# Patient Record
Sex: FEMALE | Race: WHITE | NOT HISPANIC OR LATINO | Employment: PART TIME | URBAN - METROPOLITAN AREA
[De-identification: names, ages, dates, MRNs, and addresses within clinical notes are randomized per-mention and may not be internally consistent; named-entity substitution may affect disease eponyms.]

---
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
## 2022-01-07 ENCOUNTER — OFFICE VISIT (OUTPATIENT)
Dept: URGENT CARE | Facility: CLINIC | Age: 22
End: 2022-01-07
Payer: COMMERCIAL

## 2022-01-07 VITALS
OXYGEN SATURATION: 98 % | BODY MASS INDEX: 25.46 KG/M2 | HEIGHT: 68 IN | RESPIRATION RATE: 18 BRPM | WEIGHT: 168 LBS | TEMPERATURE: 97.7 F | HEART RATE: 61 BPM

## 2022-01-07 DIAGNOSIS — J06.9 ACUTE URI: Primary | ICD-10-CM

## 2022-01-07 LAB — S PYO AG THROAT QL: NEGATIVE

## 2022-01-07 PROCEDURE — U0005 INFEC AGEN DETEC AMPLI PROBE: HCPCS | Performed by: PHYSICIAN ASSISTANT

## 2022-01-07 PROCEDURE — 99213 OFFICE O/P EST LOW 20 MIN: CPT | Performed by: PHYSICIAN ASSISTANT

## 2022-01-07 PROCEDURE — 87070 CULTURE OTHR SPECIMN AEROBIC: CPT | Performed by: PHYSICIAN ASSISTANT

## 2022-01-07 PROCEDURE — U0003 INFECTIOUS AGENT DETECTION BY NUCLEIC ACID (DNA OR RNA); SEVERE ACUTE RESPIRATORY SYNDROME CORONAVIRUS 2 (SARS-COV-2) (CORONAVIRUS DISEASE [COVID-19]), AMPLIFIED PROBE TECHNIQUE, MAKING USE OF HIGH THROUGHPUT TECHNOLOGIES AS DESCRIBED BY CMS-2020-01-R: HCPCS | Performed by: PHYSICIAN ASSISTANT

## 2022-01-07 PROCEDURE — 87880 STREP A ASSAY W/OPTIC: CPT | Performed by: PHYSICIAN ASSISTANT

## 2022-01-07 NOTE — LETTER
[place] [address]  Dept: [phone]    January 7, 2022    Patient: Valerie Quesada  YOB: 2000    Valerie Quesada was seen and evaluated at our [facility]  She may not return to work until test results are back  If patient is positive, may return to work on 1/10/22  If negative, will need to quarantine longer due to exposure        Sincerely,    Celina Lawson PA-C

## 2022-01-07 NOTE — PROGRESS NOTES
[facility]    NAME: Floyd Martin is a 25 y o  female  : 2000    MRN: [id number]  DATE: 2022  TIME: 4:25 PM    Assessment and Plan   Acute URI [J06 9]  1  Acute URI  POCT rapid strepA    COVID Only -Office Collect    Throat culture       Patient Instructions     Patient Instructions   Quarantine until test results are back   Vitamin D3 2000 IU daily  Vitamin C 1g every 12 hours  Multivitamin daily  Fluids and rest  Over the counter cold medication as needed  Follow up with PCP in 3-5 days  Proceed to  ER if symptoms worsen  [address]    Your healthcare provider and/or public health staff have evaluated you and have determined that you do not need to remain in the hospital at this time  At this time you can be isolated at home where you will be monitored by staff from your local or state health department  You should carefully follow the prevention and isolation steps below until a healthcare provider or local or state health department says that you can return to your normal activities  Stay home except to get medical care    People who are mildly ill with COVID-19 are able to isolate at home during their illness  You should restrict activities outside your home, except for getting medical care  Do not go to work, school, or public areas  Avoid using public transportation, ride-sharing, or taxis  Separate yourself from other people and animals in your home    People: As much as possible, you should stay in a specific room and away from other people in your home  Also, you should use a separate bathroom, if available  Animals: You should restrict contact with pets and other animals while you are sick with COVID-19, just like you would around other people   Although there have not been reports of pets or other animals becoming sick with COVID-19, it is still recommended that people sick with COVID-19 limit contact with animals until more information is known about the virus  When possible, have another member of your household care for your animals while you are sick  If you are sick with COVID-19, avoid contact with your pet, including petting, snuggling, being kissed or licked, and sharing food  If you must care for your pet or be around animals while you are sick, wash your hands before and after you interact with pets and wear a facemask  See COVID-19 and Animals for more information  Call ahead before visiting your doctor    If you have a medical appointment, call the healthcare provider and tell them that you have or may have COVID-19  This will help the healthcare providers office take steps to keep other people from getting infected or exposed  Wear a facemask    You should wear a facemask when you are around other people (e g , sharing a room or vehicle) or pets and before you enter a healthcare providers office  If you are not able to wear a facemask (for example, because it causes trouble breathing), then people who live with you should not stay in the same room with you, or they should wear a facemask if they enter your room  Cover your coughs and sneezes    Cover your mouth and nose with a tissue when you cough or sneeze  Throw used tissues in a lined trash can  Immediately wash your hands with soap and water for at least 20 seconds or, if soap and water are not available, clean your hands with an alcohol-based hand  that contains at least 60% alcohol  Clean your hands often    Wash your hands often with soap and water for at least 20 seconds, especially after blowing your nose, coughing, or sneezing; going to the bathroom; and before eating or preparing food  If soap and water are not readily available, use an alcohol-based hand  with at least 60% alcohol, covering all surfaces of your hands and rubbing them together until they feel dry  Soap and water are the best option if hands are visibly dirty   Avoid touching your eyes, nose, and mouth with unwashed hands  Avoid sharing personal household items    You should not share dishes, drinking glasses, cups, eating utensils, towels, or bedding with other people or pets in your home  After using these items, they should be washed thoroughly with soap and water  Clean all high-touch surfaces everyday    High touch surfaces include counters, tabletops, doorknobs, bathroom fixtures, toilets, phones, keyboards, tablets, and bedside tables  Also, clean any surfaces that may have blood, stool, or body fluids on them  Use a household cleaning spray or wipe, according to the label instructions  Labels contain instructions for safe and effective use of the cleaning product including precautions you should take when applying the product, such as wearing gloves and making sure you have good ventilation during use of the product  Monitor your symptoms    Seek prompt medical attention if your illness is worsening (e g , difficulty breathing)  Before seeking care, call your healthcare provider and tell them that you have, or are being evaluated for, COVID-19  Put on a facemask before you enter the facility  These steps will help the healthcare providers office to keep other people in the office or waiting room from getting infected or exposed  Ask your healthcare provider to call the local or [facility] health department  Persons who are placed under active monitoring or facilitated self-monitoring should follow instructions provided by their local health department or occupational health professionals, as appropriate  If you have a medical emergency and need to call 911, notify the dispatch personnel that you have, or are being evaluated for COVID-19  If possible, put on a facemask before emergency medical services arrive      Discontinuing home isolation    Patients with confirmed COVID-19 should remain under home isolation precautions until the following conditions are met:   - They have had no fever for at least 24 hours (that is one full day of no fever without the use medicine that reduces fevers)  AND  - other symptoms have improved (for example, when their cough or shortness of breath have improved)  AND  - If had mild or moderate illness, at least 10 days have passed since their symptoms first appeared or if severe illness (needed oxygen) or immunosuppressed, at least 20 days have passed since symptoms first appeared  Patients with confirmed COVID-19 should also notify close contacts (including their workplace) and ask that they self-quarantine  Currently, close contact is defined as being within 6 feet for 15 minutes or more from the period 24 hours starting 48 hours before symptom onset to the time at which the patient went into isolation  Close contacts of patients diagnosed with COVID-19 should be instructed by the patient to self-quarantine for 14 days from the last time of their last contact with the patient  Source: [employer]         Chief Complaint     Chief Complaint   Patient presents with    Cough     nausea started 30 th of dec     Sore Throat    Headache     stuffy nose        History of Present Illness   25year old  Female here with sore throat, headache, cough, nausea for the past week  Nasal congestion  Thinks that she has covid  Has had exposure  Symptoms started 12/30  Cough  Associated symptoms include headaches, postnasal drip and a sore throat  Pertinent negatives include no chills, ear pain, fever, shortness of breath or wheezing  Sore Throat   Associated symptoms include coughing and headaches  Pertinent negatives include no congestion, ear discharge, ear pain or shortness of breath  Headache   Associated symptoms include coughing, nausea, sinus pressure and a sore throat  Pertinent negatives include no ear pain or fever         Review of Systems   Review of Systems   Constitutional: Negative for appetite change, chills and fever  HENT: Positive for postnasal drip, sinus pressure and sore throat  Negative for congestion, ear discharge, ear pain, facial swelling and sneezing  Respiratory: Positive for cough  Negative for shortness of breath and wheezing  Gastrointestinal: Positive for nausea  Neurological: Positive for headaches  Current Medications     Current Outpatient Medications:     albuterol (ProAir HFA) 90 mcg/act inhaler, Inhale 2 puffs every 6 (six) hours as needed (exercise induced asthma), Disp: 8 5 g, Rfl: 0    albuterol (PROVENTIL HFA,VENTOLIN HFA) 90 mcg/act inhaler, Inhale 2 puffs every 6 (six) hours as needed for wheezing, Disp: , Rfl:     amitriptyline (ELAVIL) 50 mg tablet, Take 1 tablet (50 mg total) by mouth daily at bedtime, Disp: 90 tablet, Rfl: 1    medroxyPROGESTERone acetate (DEPO-PROVERA SYRINGE) 150 mg/mL injection, inject intramuscularly EVERY THREE MONTHS, Disp: , Rfl: 0    RA VITAMIN D-3 50 MCG (2000 UT) CAPS, Take 1 capsule by mouth daily, Disp: , Rfl: 0    topiramate (TOPAMAX) 100 mg tablet, Take 100 mg by mouth 2 (two) times a day, Disp: , Rfl:     Current Allergies     Allergies as of 01/07/2022    (No Known Allergies)          The following portions of the patient's history were reviewed and updated as appropriate: allergies, current medications, past family history, past medical history, past social history, past surgical history and problem list    Past Medical History:   Diagnosis Date    Allergies     Asthma     Migraine      History reviewed  No pertinent surgical history  History reviewed  No pertinent family history    Social History     Socioeconomic History    Marital status: Single     Spouse name: Not on file    Number of children: Not on file    Years of education: Not on file    Highest education level: Not on file   Occupational History    Not on file   Tobacco Use    Smoking status: Never Smoker    Smokeless tobacco: Never Used   Vaping Use    Vaping Use: Never used   Substance and Sexual Activity    Alcohol use: Yes    Drug use: No    Sexual activity: Yes   Other Topics Concern    Not on file   Social History Narrative    Not on file     Social Determinants of Health     Financial Resource Strain: Not on file   Food Insecurity: Not on file   Transportation Needs: Not on file   Physical Activity: Not on file   Stress: Not on file   Social Connections: Not on file   Intimate Partner Violence: Not on file   Housing Stability: Not on file     Medications have been verified  Objective   Pulse 61   Temp 97 7 °F (36 5 °C)   Resp 18   Ht 5' 8" (1 727 m)   Wt 76 2 kg (168 lb)   SpO2 98%   BMI 25 54 kg/m²      Physical Exam   Physical Exam  Vitals and nursing note reviewed  Constitutional:       General: She is not in acute distress  Appearance: She is well-developed  HENT:      Head: Normocephalic and atraumatic  Right Ear: Tympanic membrane normal       Left Ear: Tympanic membrane normal       Nose: Nose normal  No mucosal edema or rhinorrhea  Right Sinus: No maxillary sinus tenderness or frontal sinus tenderness  Left Sinus: No maxillary sinus tenderness or frontal sinus tenderness  Mouth/Throat:      Mouth: Mucous membranes are moist       Pharynx: Posterior oropharyngeal erythema present  No oropharyngeal exudate  Eyes:      Conjunctiva/sclera: Conjunctivae normal    Cardiovascular:      Rate and Rhythm: Normal rate and regular rhythm  Heart sounds: Normal heart sounds  No murmur heard

## 2022-01-07 NOTE — PATIENT INSTRUCTIONS
Quarantine until test results are back   Vitamin D3 2000 IU daily  Vitamin C 1g every 12 hours  Multivitamin daily  Fluids and rest  Over the counter cold medication as needed  Follow up with PCP in 3-5 days  Proceed to  ER if symptoms worsen  [address]    Your healthcare provider and/or public health staff have evaluated you and have determined that you do not need to remain in the hospital at this time  At this time you can be isolated at home where you will be monitored by staff from your local or state health department  You should carefully follow the prevention and isolation steps below until a healthcare provider or local or state health department says that you can return to your normal activities  Stay home except to get medical care    People who are mildly ill with COVID-19 are able to isolate at home during their illness  You should restrict activities outside your home, except for getting medical care  Do not go to work, school, or public areas  Avoid using public transportation, ride-sharing, or taxis  Separate yourself from other people and animals in your home    People: As much as possible, you should stay in a specific room and away from other people in your home  Also, you should use a separate bathroom, if available  Animals: You should restrict contact with pets and other animals while you are sick with COVID-19, just like you would around other people  Although there have not been reports of pets or other animals becoming sick with COVID-19, it is still recommended that people sick with COVID-19 limit contact with animals until more information is known about the virus  When possible, have another member of your household care for your animals while you are sick  If you are sick with COVID-19, avoid contact with your pet, including petting, snuggling, being kissed or licked, and sharing food   If you must care for your pet or be around animals while you are sick, wash your hands before and after you interact with pets and wear a facemask  See COVID-19 and Animals for more information  Call ahead before visiting your doctor    If you have a medical appointment, call the healthcare provider and tell them that you have or may have COVID-19  This will help the healthcare providers office take steps to keep other people from getting infected or exposed  Wear a facemask    You should wear a facemask when you are around other people (e g , sharing a room or vehicle) or pets and before you enter a healthcare providers office  If you are not able to wear a facemask (for example, because it causes trouble breathing), then people who live with you should not stay in the same room with you, or they should wear a facemask if they enter your room  Cover your coughs and sneezes    Cover your mouth and nose with a tissue when you cough or sneeze  Throw used tissues in a lined trash can  Immediately wash your hands with soap and water for at least 20 seconds or, if soap and water are not available, clean your hands with an alcohol-based hand  that contains at least 60% alcohol  Clean your hands often    Wash your hands often with soap and water for at least 20 seconds, especially after blowing your nose, coughing, or sneezing; going to the bathroom; and before eating or preparing food  If soap and water are not readily available, use an alcohol-based hand  with at least 60% alcohol, covering all surfaces of your hands and rubbing them together until they feel dry  Soap and water are the best option if hands are visibly dirty  Avoid touching your eyes, nose, and mouth with unwashed hands  Avoid sharing personal household items    You should not share dishes, drinking glasses, cups, eating utensils, towels, or bedding with other people or pets in your home  After using these items, they should be washed thoroughly with soap and water      Clean all high-touch surfaces everyday    High touch surfaces include counters, tabletops, doorknobs, bathroom fixtures, toilets, phones, keyboards, tablets, and bedside tables  Also, clean any surfaces that may have blood, stool, or body fluids on them  Use a household cleaning spray or wipe, according to the label instructions  Labels contain instructions for safe and effective use of the cleaning product including precautions you should take when applying the product, such as wearing gloves and making sure you have good ventilation during use of the product  Monitor your symptoms    Seek prompt medical attention if your illness is worsening (e g , difficulty breathing)  Before seeking care, call your healthcare provider and tell them that you have, or are being evaluated for, COVID-19  Put on a facemask before you enter the facility  These steps will help the healthcare providers office to keep other people in the office or waiting room from getting infected or exposed  Ask your healthcare provider to call the local or [facility] health department  Persons who are placed under active monitoring or facilitated self-monitoring should follow instructions provided by their local health department or occupational health professionals, as appropriate  If you have a medical emergency and need to call 911, notify the dispatch personnel that you have, or are being evaluated for COVID-19  If possible, put on a facemask before emergency medical services arrive      Discontinuing home isolation    Patients with confirmed COVID-19 should remain under home isolation precautions until the following conditions are met:   - They have had no fever for at least 24 hours (that is one full day of no fever without the use medicine that reduces fevers)  AND  - other symptoms have improved (for example, when their cough or shortness of breath have improved)  AND  - If had mild or moderate illness, at least 10 days have passed since their symptoms first appeared or if severe illness (needed oxygen) or immunosuppressed, at least 20 days have passed since symptoms first appeared  Patients with confirmed COVID-19 should also notify close contacts (including their workplace) and ask that they self-quarantine  Currently, close contact is defined as being within 6 feet for 15 minutes or more from the period 24 hours starting 48 hours before symptom onset to the time at which the patient went into isolation  Close contacts of patients diagnosed with COVID-19 should be instructed by the patient to self-quarantine for 14 days from the last time of their last contact with the patient       Source: [employer]

## 2022-01-09 ENCOUNTER — TELEPHONE (OUTPATIENT)
Dept: URGENT CARE | Facility: CLINIC | Age: 22
End: 2022-01-09

## 2022-01-09 LAB
BACTERIA THROAT CULT: NORMAL
SARS-COV-2 RNA RESP QL NAA+PROBE: POSITIVE

## 2022-01-10 NOTE — TELEPHONE ENCOUNTER
LM for patient regarding test results  Results were viewed in [employer]  Comment/message left for patient in [place]  Recommend follow up with pcp  Quarantine guidelines given and instructions for worsening symptoms  If they have any questions, can call us at [phone]

## 2022-03-23 ENCOUNTER — APPOINTMENT (EMERGENCY)
Dept: CT IMAGING | Facility: HOSPITAL | Age: 22
End: 2022-03-23
Payer: COMMERCIAL

## 2022-03-23 ENCOUNTER — APPOINTMENT (EMERGENCY)
Dept: NON INVASIVE DIAGNOSTICS | Facility: HOSPITAL | Age: 22
End: 2022-03-23
Payer: COMMERCIAL

## 2022-03-23 ENCOUNTER — HOSPITAL ENCOUNTER (EMERGENCY)
Facility: HOSPITAL | Age: 22
Discharge: STILL ADMITTED OR ADMINISTRATIVE | End: 2022-03-23
Attending: EMERGENCY MEDICINE | Admitting: EMERGENCY MEDICINE
Payer: COMMERCIAL

## 2022-03-23 ENCOUNTER — HOSPITAL ENCOUNTER (INPATIENT)
Facility: HOSPITAL | Age: 22
LOS: 2 days | Discharge: HOME | DRG: 287 | End: 2022-03-25
Attending: HOSPITALIST | Admitting: INTERNAL MEDICINE
Payer: COMMERCIAL

## 2022-03-23 ENCOUNTER — APPOINTMENT (EMERGENCY)
Dept: RADIOLOGY | Facility: HOSPITAL | Age: 22
End: 2022-03-23
Payer: COMMERCIAL

## 2022-03-23 VITALS
WEIGHT: 157 LBS | SYSTOLIC BLOOD PRESSURE: 107 MMHG | BODY MASS INDEX: 23.79 KG/M2 | HEART RATE: 61 BPM | RESPIRATION RATE: 20 BRPM | OXYGEN SATURATION: 99 % | HEIGHT: 68 IN | DIASTOLIC BLOOD PRESSURE: 73 MMHG

## 2022-03-23 DIAGNOSIS — R07.9 CHEST PAIN: Primary | ICD-10-CM

## 2022-03-23 DIAGNOSIS — R79.89 ELEVATED D-DIMER: ICD-10-CM

## 2022-03-23 DIAGNOSIS — I31.9 MYOPERICARDITIS: ICD-10-CM

## 2022-03-23 DIAGNOSIS — R77.8 ELEVATED TROPONIN: ICD-10-CM

## 2022-03-23 DIAGNOSIS — I21.4 NSTEMI (NON-ST ELEVATED MYOCARDIAL INFARCTION) (HCC): Primary | ICD-10-CM

## 2022-03-23 PROBLEM — J45.909 ASTHMA: Status: ACTIVE | Noted: 2022-03-23

## 2022-03-23 LAB
2HR DELTA HS TROPONIN: 1020 NG/L
4HR DELTA HS TROPONIN: 2402 NG/L
ALBUMIN SERPL BCP-MCNC: 4.4 G/DL (ref 3.5–5)
ALP SERPL-CCNC: 66 U/L (ref 34–104)
ALT SERPL W P-5'-P-CCNC: 18 U/L (ref 7–52)
ANION GAP SERPL CALCULATED.3IONS-SCNC: 9 MMOL/L (ref 4–13)
APTT PPP: 135 SECONDS (ref 23–37)
APTT PPP: 33 SECONDS (ref 23–37)
AST SERPL W P-5'-P-CCNC: 20 U/L (ref 13–39)
ATRIAL RATE: 58 BPM
ATRIAL RATE: 60 BPM
ATRIAL RATE: 67 BPM
BASOPHILS # BLD AUTO: 0.02 THOUSANDS/ΜL (ref 0–0.1)
BASOPHILS NFR BLD AUTO: 0 % (ref 0–1)
BILIRUB SERPL-MCNC: 0.41 MG/DL (ref 0.2–1)
BUN SERPL-MCNC: 19 MG/DL (ref 5–25)
CALCIUM SERPL-MCNC: 9.2 MG/DL (ref 8.4–10.2)
CARDIAC TROPONIN I PNL SERPL HS: 1051 NG/L
CARDIAC TROPONIN I PNL SERPL HS: 2433 NG/L
CARDIAC TROPONIN I PNL SERPL HS: 3035 NG/L (ref 8–18)
CARDIAC TROPONIN I PNL SERPL HS: 31 NG/L
CHLORIDE SERPL-SCNC: 109 MMOL/L (ref 96–108)
CHOLEST SERPL-MCNC: 173 MG/DL
CO2 SERPL-SCNC: 21 MMOL/L (ref 21–32)
CREAT SERPL-MCNC: 1.07 MG/DL (ref 0.6–1.3)
D DIMER PPP FEU-MCNC: 0.57 UG/ML FEU
EOSINOPHIL # BLD AUTO: 0.09 THOUSAND/ΜL (ref 0–0.61)
EOSINOPHIL NFR BLD AUTO: 2 % (ref 0–6)
ERYTHROCYTE [DISTWIDTH] IN BLOOD BY AUTOMATED COUNT: 12.6 % (ref 11.6–15.1)
EST. AVERAGE GLUCOSE BLD GHB EST-MCNC: 97 MG/DL
EXT PREG TEST URINE: NEGATIVE
EXT. CONTROL ED NAV: NORMAL
GFR SERPL CREATININE-BSD FRML MDRD: 74 ML/MIN/1.73SQ M
GLUCOSE SERPL-MCNC: 85 MG/DL (ref 65–140)
HBA1C MFR BLD: 5 %
HCT VFR BLD AUTO: 42.7 % (ref 34.8–46.1)
HDLC SERPL-MCNC: 57 MG/DL
HGB BLD-MCNC: 14.3 G/DL (ref 11.5–15.4)
IMM GRANULOCYTES # BLD AUTO: 0.01 THOUSAND/UL (ref 0–0.2)
IMM GRANULOCYTES NFR BLD AUTO: 0 % (ref 0–2)
INR PPP: 1.08 (ref 0.84–1.19)
LDLC SERPL CALC-MCNC: 113 MG/DL (ref 0–100)
LYMPHOCYTES # BLD AUTO: 1.52 THOUSANDS/ΜL (ref 0.6–4.47)
LYMPHOCYTES NFR BLD AUTO: 25 % (ref 14–44)
MAGNESIUM SERPL-MCNC: 2 MG/DL (ref 1.9–2.7)
MCH RBC QN AUTO: 31.4 PG (ref 26.8–34.3)
MCHC RBC AUTO-ENTMCNC: 33.5 G/DL (ref 31.4–37.4)
MCV RBC AUTO: 94 FL (ref 82–98)
MONOCYTES # BLD AUTO: 0.45 THOUSAND/ΜL (ref 0.17–1.22)
MONOCYTES NFR BLD AUTO: 7 % (ref 4–12)
NEUTROPHILS # BLD AUTO: 4.05 THOUSANDS/ΜL (ref 1.85–7.62)
NEUTS SEG NFR BLD AUTO: 66 % (ref 43–75)
NRBC BLD AUTO-RTO: 0 /100 WBCS
P AXIS: 67 DEGREES
P AXIS: 71 DEGREES
P AXIS: 74 DEGREES
PLATELET # BLD AUTO: 265 THOUSANDS/UL (ref 149–390)
PMV BLD AUTO: 10.4 FL (ref 8.9–12.7)
POTASSIUM SERPL-SCNC: 4 MMOL/L (ref 3.5–5.3)
PR INTERVAL: 154 MS
PR INTERVAL: 156 MS
PR INTERVAL: 164 MS
PROT SERPL-MCNC: 7.2 G/DL (ref 6.4–8.4)
PROTHROMBIN TIME: 13.9 SECONDS (ref 11.6–14.5)
QRS AXIS: 65 DEGREES
QRS AXIS: 66 DEGREES
QRS AXIS: 70 DEGREES
QRSD INTERVAL: 72 MS
QRSD INTERVAL: 74 MS
QRSD INTERVAL: 74 MS
QT INTERVAL: 444 MS
QT INTERVAL: 446 MS
QT INTERVAL: 472 MS
QTC INTERVAL: 444 MS
QTC INTERVAL: 463 MS
QTC INTERVAL: 471 MS
RBC # BLD AUTO: 4.55 MILLION/UL (ref 3.81–5.12)
SODIUM SERPL-SCNC: 139 MMOL/L (ref 135–147)
T WAVE AXIS: 25 DEGREES
T WAVE AXIS: 27 DEGREES
T WAVE AXIS: 37 DEGREES
TRIGL SERPL-MCNC: 15 MG/DL
TSH SERPL DL<=0.05 MIU/L-ACNC: 1.17 UIU/ML (ref 0.45–5.33)
VENTRICULAR RATE: 58 BPM
VENTRICULAR RATE: 60 BPM
VENTRICULAR RATE: 67 BPM
WBC # BLD AUTO: 6.14 THOUSAND/UL (ref 4.31–10.16)

## 2022-03-23 PROCEDURE — 96375 TX/PRO/DX INJ NEW DRUG ADDON: CPT

## 2022-03-23 PROCEDURE — 84443 ASSAY THYROID STIM HORMONE: CPT

## 2022-03-23 PROCEDURE — 99223 1ST HOSP IP/OBS HIGH 75: CPT | Performed by: PHYSICIAN ASSISTANT

## 2022-03-23 PROCEDURE — 85025 COMPLETE CBC W/AUTO DIFF WBC: CPT

## 2022-03-23 PROCEDURE — 80053 COMPREHEN METABOLIC PANEL: CPT

## 2022-03-23 PROCEDURE — 84484 ASSAY OF TROPONIN QUANT: CPT

## 2022-03-23 PROCEDURE — 83036 HEMOGLOBIN GLYCOSYLATED A1C: CPT | Performed by: PHYSICIAN ASSISTANT

## 2022-03-23 PROCEDURE — 85730 THROMBOPLASTIN TIME PARTIAL: CPT | Performed by: PHYSICIAN ASSISTANT

## 2022-03-23 PROCEDURE — 85610 PROTHROMBIN TIME: CPT | Performed by: EMERGENCY MEDICINE

## 2022-03-23 PROCEDURE — 36415 COLL VENOUS BLD VENIPUNCTURE: CPT

## 2022-03-23 PROCEDURE — 81025 URINE PREGNANCY TEST: CPT

## 2022-03-23 PROCEDURE — 96365 THER/PROPH/DIAG IV INF INIT: CPT

## 2022-03-23 PROCEDURE — 99285 EMERGENCY DEPT VISIT HI MDM: CPT

## 2022-03-23 PROCEDURE — 80061 LIPID PANEL: CPT | Performed by: PHYSICIAN ASSISTANT

## 2022-03-23 PROCEDURE — 93306 TTE W/DOPPLER COMPLETE: CPT | Performed by: INTERNAL MEDICINE

## 2022-03-23 PROCEDURE — 71045 X-RAY EXAM CHEST 1 VIEW: CPT

## 2022-03-23 PROCEDURE — G1004 CDSM NDSC: HCPCS

## 2022-03-23 PROCEDURE — 85730 THROMBOPLASTIN TIME PARTIAL: CPT | Performed by: EMERGENCY MEDICINE

## 2022-03-23 PROCEDURE — 93005 ELECTROCARDIOGRAM TRACING: CPT

## 2022-03-23 PROCEDURE — 93306 TTE W/DOPPLER COMPLETE: CPT

## 2022-03-23 PROCEDURE — 93010 ELECTROCARDIOGRAM REPORT: CPT | Performed by: INTERNAL MEDICINE

## 2022-03-23 PROCEDURE — 85379 FIBRIN DEGRADATION QUANT: CPT | Performed by: EMERGENCY MEDICINE

## 2022-03-23 PROCEDURE — 83735 ASSAY OF MAGNESIUM: CPT

## 2022-03-23 PROCEDURE — 84484 ASSAY OF TROPONIN QUANT: CPT | Performed by: PHYSICIAN ASSISTANT

## 2022-03-23 PROCEDURE — 71275 CT ANGIOGRAPHY CHEST: CPT

## 2022-03-23 RX ORDER — TOPIRAMATE 100 MG/1
100 TABLET, FILM COATED ORAL 2 TIMES DAILY
Status: DISCONTINUED | OUTPATIENT
Start: 2022-03-23 | End: 2022-03-23

## 2022-03-23 RX ORDER — HEPARIN SODIUM 1000 [USP'U]/ML
4000 INJECTION, SOLUTION INTRAVENOUS; SUBCUTANEOUS
Status: DISCONTINUED | OUTPATIENT
Start: 2022-03-23 | End: 2022-03-24

## 2022-03-23 RX ORDER — LANOLIN ALCOHOL/MO/W.PET/CERES
6 CREAM (GRAM) TOPICAL
Status: DISCONTINUED | OUTPATIENT
Start: 2022-03-23 | End: 2022-03-25 | Stop reason: HOSPADM

## 2022-03-23 RX ORDER — HEPARIN SODIUM 1000 [USP'U]/ML
4000 INJECTION, SOLUTION INTRAVENOUS; SUBCUTANEOUS ONCE
Status: COMPLETED | OUTPATIENT
Start: 2022-03-23 | End: 2022-03-23

## 2022-03-23 RX ORDER — CALCIUM CARBONATE 200(500)MG
1000 TABLET,CHEWABLE ORAL DAILY PRN
Status: DISCONTINUED | OUTPATIENT
Start: 2022-03-23 | End: 2022-03-25 | Stop reason: HOSPADM

## 2022-03-23 RX ORDER — NITROGLYCERIN 0.4 MG/1
0.4 TABLET SUBLINGUAL
Status: DISCONTINUED | OUTPATIENT
Start: 2022-03-23 | End: 2022-03-25 | Stop reason: HOSPADM

## 2022-03-23 RX ORDER — HEPARIN SODIUM 10000 [USP'U]/100ML
3-20 INJECTION, SOLUTION INTRAVENOUS
Status: DISCONTINUED | OUTPATIENT
Start: 2022-03-23 | End: 2022-03-23 | Stop reason: HOSPADM

## 2022-03-23 RX ORDER — ACETAMINOPHEN 325 MG/1
650 TABLET ORAL EVERY 4 HOURS PRN
Status: DISCONTINUED | OUTPATIENT
Start: 2022-03-23 | End: 2022-03-25 | Stop reason: HOSPADM

## 2022-03-23 RX ORDER — SODIUM CHLORIDE 9 MG/ML
3 INJECTION INTRAVENOUS
Status: DISCONTINUED | OUTPATIENT
Start: 2022-03-23 | End: 2022-03-23 | Stop reason: HOSPADM

## 2022-03-23 RX ORDER — SODIUM CHLORIDE 9 MG/ML
100 INJECTION, SOLUTION INTRAVENOUS CONTINUOUS
Status: DISCONTINUED | OUTPATIENT
Start: 2022-03-24 | End: 2022-03-24

## 2022-03-23 RX ORDER — HEPARIN SODIUM 1000 [USP'U]/ML
2000 INJECTION, SOLUTION INTRAVENOUS; SUBCUTANEOUS
Status: DISCONTINUED | OUTPATIENT
Start: 2022-03-23 | End: 2022-03-24

## 2022-03-23 RX ORDER — ONDANSETRON 2 MG/ML
4 INJECTION INTRAMUSCULAR; INTRAVENOUS EVERY 6 HOURS PRN
Status: DISCONTINUED | OUTPATIENT
Start: 2022-03-23 | End: 2022-03-25 | Stop reason: HOSPADM

## 2022-03-23 RX ORDER — HEPARIN SODIUM 1000 [USP'U]/ML
4000 INJECTION, SOLUTION INTRAVENOUS; SUBCUTANEOUS
Status: DISCONTINUED | OUTPATIENT
Start: 2022-03-23 | End: 2022-03-23 | Stop reason: HOSPADM

## 2022-03-23 RX ORDER — ASPIRIN 81 MG/1
324 TABLET, CHEWABLE ORAL ONCE
Status: COMPLETED | OUTPATIENT
Start: 2022-03-23 | End: 2022-03-23

## 2022-03-23 RX ORDER — HEPARIN SODIUM 1000 [USP'U]/ML
2000 INJECTION, SOLUTION INTRAVENOUS; SUBCUTANEOUS
Status: DISCONTINUED | OUTPATIENT
Start: 2022-03-23 | End: 2022-03-23 | Stop reason: HOSPADM

## 2022-03-23 RX ORDER — IBUPROFEN 400 MG/1
400 TABLET ORAL EVERY 6 HOURS PRN
Status: DISCONTINUED | OUTPATIENT
Start: 2022-03-23 | End: 2022-03-24

## 2022-03-23 RX ORDER — ALBUTEROL SULFATE 90 UG/1
2 AEROSOL, METERED RESPIRATORY (INHALATION) EVERY 6 HOURS PRN
Status: DISCONTINUED | OUTPATIENT
Start: 2022-03-23 | End: 2022-03-25 | Stop reason: HOSPADM

## 2022-03-23 RX ADMIN — HEPARIN SODIUM 12 UNITS/KG/HR: 10000 INJECTION, SOLUTION INTRAVENOUS at 17:59

## 2022-03-23 RX ADMIN — HEPARIN SODIUM 4000 UNITS: 1000 INJECTION, SOLUTION INTRAVENOUS; SUBCUTANEOUS at 18:04

## 2022-03-23 RX ADMIN — IOHEXOL 85 ML: 350 INJECTION, SOLUTION INTRAVENOUS at 15:30

## 2022-03-23 RX ADMIN — ASPIRIN 81 MG 324 MG: 81 TABLET ORAL at 17:59

## 2022-03-23 RX ADMIN — TOPIRAMATE 150 MG: 100 TABLET, FILM COATED ORAL at 21:06

## 2022-03-23 RX ADMIN — IBUPROFEN 400 MG: 400 TABLET ORAL at 20:38

## 2022-03-23 RX ADMIN — HEPARIN SODIUM 12 UNITS/KG/HR: 10000 INJECTION, SOLUTION INTRAVENOUS; SUBCUTANEOUS at 20:43

## 2022-03-23 RX ADMIN — MELATONIN 6 MG: at 21:06

## 2022-03-23 NOTE — ASSESSMENT & PLAN NOTE
· Patient presented to the hospital with complaint of sudden-onset chest pain with associated shortness of breath  · Was found to have elevated troponin 31, 1051, 2433  · CTA without evidence of PE  · EKG without acute ST changes  · Transfer requested to [facility] for possible cardiac catheterization  · Echocardiogram with EF of 50%, no diagnostic regional wall motion abnormalities identified  · Loaded with aspirin  · Check UDS and lipid panel  · Continue heparin drip  · Cardiology consult  · NPO after midnight  · Monitor on telemetry

## 2022-03-23 NOTE — H&P
[address] [facility] 2000, 23 y o  female MRN: [id number]  Unit/Bed#: E4 -01 Encounter: [id number]  Primary Care Provider: Jovanny Puga MD   Date and time admitted to hospital: 3/23/2022  7:06 PM    * NSTEMI (non-ST elevated myocardial infarction) [facility])  Assessment & Plan  · Patient presented the hospital complaint of sudden-onset chest pain with associated shortness of breath  · Was found to have elevated troponin 31, 1051, 2433  · CTA without evidence of PE  · EKG without acute ST changes  · Transfer requested to [facility] for possible cardiac catheterization  · Echocardiogram with EF of 50%, no diagnostic regional wall motion abnormalities identified  · Loaded with aspirin  · Check UDS and lipid panel  · Continue heparin drip  · Cardiology consult  · NPO after midnight  · Monitor on telemetry    Asthma  Assessment & Plan  · History of exercise induced asthma, does not appear in exacerbation at this time  · Continue p r n  Albuterol    Migraine without aura and without status migrainosus, not intractable  Assessment & Plan  · Continue Topamax      VTE Pharmacologic Prophylaxis: VTE Score: 1 Low Risk (Score 0-2) - Encourage Ambulation  Code Status: Level 1 - Full Code   Discussion with family: Updated  (mother) at bedside  Anticipated Length of Stay: Patient will be admitted on an inpatient basis with an anticipated length of stay of greater than 2 midnights secondary to NSTEMI  Total Time for Visit, including Counseling / Coordination of Care: 60 minutes Greater than 50% of this total time spent on direct patient counseling and coordination of care  Chief Complaint:  Chest pain    History of Present Illness:  Loretta Guadalupe is a 23 y o  female with a PMH of asthma, migraines, anxiety who presents with chest pain  Patient presented to the hospital with complaint of chest pain    She notes she woke up this morning and laid in bed for few hours  She then stood up and noticed 8/10 chest pain  She notes associated shortness of breath without diaphoresis or nausea  She denies any lightheadedness or dizziness  She does report she has been feeling more tired than normal at softball practice but otherwise has been feeling well  She reports her pain has nearly completely resolved at time of admission  Rating her pain a 1/10 at time of admission  She notes improvement in her pain with lying still  She does report she has been having intermittent palpitations for few weeks  Does report two prior episodes of syncope approximately 3-4 years ago  At time of admission, her only complaint is mild headache  Review of Systems:  Review of Systems   Constitutional: Negative for chills and fever  HENT: Negative for trouble swallowing  Eyes: Negative for visual disturbance  Respiratory: Positive for shortness of breath  Negative for cough  Cardiovascular: Positive for chest pain  Gastrointestinal: Negative for abdominal pain and vomiting  Genitourinary: Negative for difficulty urinating  Musculoskeletal: Negative for gait problem  Skin: Negative for rash  Neurological: Negative for dizziness and weakness  Psychiatric/Behavioral: Negative for confusion  Past Medical and Surgical History:   Past Medical History:   Diagnosis Date    Allergies     Asthma     Migraine        No past surgical history on file  Meds/Allergies:  Prior to Admission medications    Medication Sig Start Date End Date Taking?  Authorizing Provider   albuterol (ProAir HFA) 90 mcg/act inhaler Inhale 2 puffs every 6 (six) hours as needed (exercise induced asthma) 9/15/21   Samara Paulson PA-C   albuterol (PROVENTIL HFA,VENTOLIN HFA) 90 mcg/act inhaler Inhale 2 puffs every 6 (six) hours as needed for wheezing    Historical Provider, MD   amitriptyline (ELAVIL) 50 mg tablet Take 1 tablet (50 mg total) by mouth daily at bedtime  Patient not taking: Reported on 3/23/2022  7/10/19   Neva Friedman MD   medroxyPROGESTERone acetate (DEPO-PROVERA SYRINGE) 150 mg/mL injection inject intramuscularly EVERY THREE MONTHS  Patient not taking: Reported on 3/23/2022 12/3/19   Historical Provider, MD MARIE VITAMIN D-3 50 MCG (2000 UT) CAPS Take 1 capsule by mouth daily  Patient not taking: Reported on 3/23/2022  10/18/19   Historical Provider, MD   topiramate (TOPAMAX) 100 mg tablet Take 100 mg by mouth 2 (two) times a day 9/13/21   Historical Provider, MD VERGARA have reviewed home medications with patient personally  Allergies: No Known Allergies    Social History:  Marital Status: Single   Occupation:  Student  Patient Pre-hospital Living Situation: Home  Patient Pre-hospital Level of Mobility: walks  Patient Pre-hospital Diet Restrictions:  None  Substance Use History:   Social History     Substance and Sexual Activity   Alcohol Use Yes     Social History     Tobacco Use   Smoking Status Never Smoker   Smokeless Tobacco Never Used     Social History     Substance and Sexual Activity   Drug Use No       Family History:  No family history on file  Physical Exam:     Vitals:        Physical Exam  Vitals reviewed  Constitutional:       General: She is not in acute distress  HENT:      Head: Normocephalic and atraumatic  Eyes:      General: No scleral icterus  Conjunctiva/sclera: Conjunctivae normal    Cardiovascular:      Rate and Rhythm: Normal rate and regular rhythm  Heart sounds: No murmur heard  Pulmonary:      Effort: Pulmonary effort is normal  No respiratory distress  Breath sounds: Normal breath sounds  Abdominal:      General: Bowel sounds are normal  There is no distension  Palpations: Abdomen is soft  Tenderness: There is no abdominal tenderness  Musculoskeletal:      Cervical back: Neck supple  Right lower leg: No edema  Left lower leg: No edema  Skin:     General: Skin is warm and dry     Neurological: Mental Status: She is alert and oriented to person, place, and time  Psychiatric:         Mood and Affect: Mood normal          Behavior: Behavior normal           Additional Data:     Lab Results:  Results from last 7 days   Lab Units 03/23/22  1201   WBC Thousand/uL 6 14   HEMOGLOBIN g/dL 14 3   HEMATOCRIT % 42 7   PLATELETS Thousands/uL 265   NEUTROS PCT % 66   LYMPHS PCT % 25   MONOS PCT % 7   EOS PCT % 2     Results from last 7 days   Lab Units 03/23/22  1201   SODIUM mmol/L 139   POTASSIUM mmol/L 4 0   CHLORIDE mmol/L 109*   CO2 mmol/L 21   BUN mg/dL 19   CREATININE mg/dL 1 07   ANION GAP mmol/L 9   CALCIUM mg/dL 9 2   ALBUMIN g/dL 4 4   TOTAL BILIRUBIN mg/dL 0 41   ALK PHOS U/L 66   ALT U/L 18   AST U/L 20   GLUCOSE RANDOM mg/dL 85     Results from last 7 days   Lab Units 03/23/22  1434   INR  1 08                   Imaging: Reviewed radiology reports from this admission including: ECHO  No orders to display       EKG and Other Studies Reviewed on Admission:   · EKG: NSR  HR 67     ** Please Note: This note has been constructed using a voice recognition system   **

## 2022-03-23 NOTE — EMTALA/ACUTE CARE TRANSFER
[address] [facility] [address]  Dept: [phone]      EMTALA TRANSFER CONSENT    NAME Sara Rea                                         2000                              MRN [id number]    I have been informed of my rights regarding examination, treatment, and transfer   by Dr Jeanine Simons MD    Benefits: Specialized equipment and/or services available at the receiving facility (Include comment)________________________ (Cath lab)    Risks: Potential for delay in receiving treatment,Potential deterioration of medical condition,Loss of IV,Increased discomfort during transfer      Transfer Request   I acknowledge that my medical condition has been evaluated and explained to me by the emergency department physician or other qualified medical person and/or my attending physician who has recommended and offered to me further medical examination and treatment  I understand the Hospital's obligation with respect to the treatment and stabilization of my emergency medical condition  I nevertheless request to be transferred  I release the Hospital, the doctor, and any other persons caring for me from all responsibility or liability for any injury or ill effects that may result from my transfer and agree to accept all responsibility for the consequences of my choice to transfer, rather than receive stabilizing treatment at the Hospital  I understand that because the transfer is my request, my insurance may not provide reimbursement for the services  The Hospital will assist and direct me and my family in how to make arrangements for transfer, but the hospital is not liable for any fees charged by the transport service  In spite of this understanding, I refuse to consent to further medical examination and treatment which has been offered to me, and request transfer to [address] : [facility]   I authorize the performance of emergency medical procedures and treatments upon me in both transit and upon arrival at the receiving facility  Additionally, I authorize the release of any and all medical records to the receiving facility and request they be transported with me, if possible  I authorize the performance of emergency medical procedures and treatments upon me in both transit and upon arrival at the receiving facility  Additionally, I authorize the release of any and all medical records to the receiving facility and request they be transported with me, if possible  I understand that the safest mode of transportation during a medical emergency is an ambulance and that the Hospital advocates the use of this mode of transport  Risks of traveling to the receiving facility by car, including absence of medical control, life sustaining equipment, such as oxygen, and medical personnel has been explained to me and I fully understand them  (ANMOL CORRECT BOX BELOW)  [  ]  I consent to the stated transfer and to be transported by ambulance/helicopter  [  ]  I consent to the stated transfer, but refuse transportation by ambulance and accept full responsibility for my transportation by car  I understand the risks of non-ambulance transfers and I exonerate the Hospital and its staff from any deterioration in my condition that results from this refusal     X___________________________________________    DATE  22  TIME________  Signature of patient or legally responsible individual signing on patient behalf           RELATIONSHIP TO PATIENT_________________________          Provider Certification    NAME Joseph Adam                                         2000                              MRN [id number]    A medical screening exam was performed on the above named patient  Based on the examination:    Condition Necessitating Transfer The primary encounter diagnosis was Chest pain   Diagnoses of Elevated troponin and Elevated d-dimer were also pertinent to this visit  Patient Condition: The patient has been stabilized such that within reasonable medical probability, no material deterioration of the patient condition or the condition of the unborn child(alma delia) is likely to result from the transfer    Reason for Transfer: Level of Care needed not available at this facility    Transfer Requirements: [address]   · Space available and qualified personnel available for treatment as acknowledged by    · Agreed to accept transfer and to provide appropriate medical treatment as acknowledged by       prechtal  · Appropriate medical records of the examination and treatment of the patient are provided at the time of transfer   [address], [address] _______  · Transfer will be performed by qualified personnel from    and appropriate transfer equipment as required, including the use of necessary and appropriate life support measures      Provider Certification: I have examined the patient and explained the following risks and benefits of being transferred/refusing transfer to the patient/family:  General risk, such as traffic hazards, adverse weather conditions, rough terrain or turbulence, possible failure of equipment (including vehicle or aircraft), or consequences of actions of persons outside the control of the transport personnel,Unanticipated needs of medical equipment and personnel during transport,The possibility of a transport vehicle being unavailable,Risk of worsening condition,Consent was not obtained as patient is committed to psychiatric facility and transfer is mandated      Based on these reasonable risks and benefits to the patient and/or the unborn child(alma delia), and based upon the information available at the time of the patients examination, I certify that the medical benefits reasonably to be expected from the provision of appropriate medical treatments at another medical facility outweigh the increasing risks, if any, to the individuals medical condition, and in the case of labor to the unborn child, from effecting the transfer      X____________________________________________ DATE 03/23/22        TIME_______      ORIGINAL - SEND TO MEDICAL RECORDS   COPY - SEND WITH PATIENT DURING TRANSFER

## 2022-03-23 NOTE — ASSESSMENT & PLAN NOTE
· History of exercise induced asthma, does not appear in exacerbation at this time  · Continue p r n   Albuterol

## 2022-03-23 NOTE — ED PROVIDER NOTES
History  Chief Complaint   Patient presents with    Chest Pain    Shortness of Breath     Patient is a 28-year-old female with a history of anxiety, and migraines reports that this morning she laid in bed for a couple hours after she woke up, and when she sat up she started having chest pain with associated shortness of breath patient denies syncope near-syncope diaphoresis at that time  Patient denies taking any p o  birth control  Patient reports that she has felt tired over the past couple weeks while at softball practice  Patient reports over the past couple years she has had intermittent palpitations, she has never told mother about  Patient denies any exertional syncope or near syncope  Patient denies any pain during exertion  Patient declining pain medication currently  Patient reports that this is not like her typical anxiety, reporting symptoms usually manifests as stomach pain  Patient arriving with her mother, reporting the concern was that patient has grandmother and grandfather have a cardiac history, although that did not start until they were in the 46s  Patient and patient's mother deny any early age does within the family  Patient denies any recent illnesses including URI/cold  Patient reports pain is relieved by lying down  Patient denies any associated nausea/vomiting  Pt denies use of illegal drugs, or dietary supplements  Prior to Admission Medications   Prescriptions Last Dose Informant Patient Reported? Taking?    RA VITAMIN D-3 50 MCG (2000 UT) CAPS Not Taking at Unknown time Self Yes No   Sig: Take 1 capsule by mouth daily   Patient not taking: Reported on 3/23/2022    albuterol (PROVENTIL HFA,VENTOLIN HFA) 90 mcg/act inhaler  Self Yes No   Sig: Inhale 2 puffs every 6 (six) hours as needed for wheezing   albuterol (ProAir HFA) 90 mcg/act inhaler   No Yes   Sig: Inhale 2 puffs every 6 (six) hours as needed (exercise induced asthma)   amitriptyline (ELAVIL) 50 mg tablet Not Taking at Unknown time Self No No   Sig: Take 1 tablet (50 mg total) by mouth daily at bedtime   Patient not taking: Reported on 3/23/2022    medroxyPROGESTERone acetate (DEPO-PROVERA SYRINGE) 150 mg/mL injection Not Taking at Unknown time Self Yes No   Sig: inject intramuscularly EVERY THREE MONTHS   Patient not taking: Reported on 3/23/2022   topiramate (TOPAMAX) 100 mg tablet   Yes Yes   Sig: Take 100 mg by mouth 2 (two) times a day      Facility-Administered Medications: None       Past Medical History:   Diagnosis Date    Allergies     Asthma     Migraine        History reviewed  No pertinent surgical history  History reviewed  No pertinent family history  I have reviewed and agree with the history as documented  E-Cigarette/Vaping    E-Cigarette Use Never User      E-Cigarette/Vaping Substances     Social History     Tobacco Use    Smoking status: Never Smoker    Smokeless tobacco: Never Used   Vaping Use    Vaping Use: Never used   Substance Use Topics    Alcohol use: Yes    Drug use: No       Review of Systems   Constitutional: Positive for fatigue  Negative for activity change, appetite change, diaphoresis and fever  HENT: Negative  Eyes: Negative  Respiratory: Positive for shortness of breath  Negative for chest tightness  Cardiovascular: Positive for chest pain  Gastrointestinal: Negative  Negative for abdominal distention, diarrhea, nausea and vomiting  Endocrine: Negative  Genitourinary: Negative  Musculoskeletal: Negative  Skin: Negative  Negative for rash  Neurological: Negative  Negative for dizziness, syncope, facial asymmetry, light-headedness, numbness and headaches  Psychiatric/Behavioral: Negative  All other systems reviewed and are negative  Physical Exam  Physical Exam  Vitals and nursing note reviewed  Constitutional:       General: She is not in acute distress  Appearance: She is normal weight  HENT:      Head: Normocephalic  Eyes:      Pupils: Pupils are equal, round, and reactive to light  Cardiovascular:      Rate and Rhythm: Normal rate and regular rhythm  Pulses:           Radial pulses are 2+ on the right side and 2+ on the left side  Posterior tibial pulses are 2+ on the right side and 2+ on the left side  Heart sounds: Normal heart sounds  Pulmonary:      Effort: Pulmonary effort is normal       Breath sounds: Normal breath sounds  Abdominal:      General: Bowel sounds are normal       Palpations: Abdomen is soft  Musculoskeletal:         General: Normal range of motion  Cervical back: Normal range of motion  Skin:     General: Skin is warm  Capillary Refill: Capillary refill takes less than 2 seconds  Neurological:      General: No focal deficit present  Mental Status: She is alert and oriented to person, place, and time     Psychiatric:         Mood and Affect: Mood normal          Vital Signs  ED Triage Vitals   Temp Pulse Respirations Blood Pressure SpO2   -- 03/23/22 1053 03/23/22 1053 03/23/22 1056 03/23/22 1053    65 20 111/88 100 %      Temp src Heart Rate Source Patient Position - Orthostatic VS BP Location FiO2 (%)   -- -- -- -- --             Pain Score       03/23/22 1053       8           Vitals:    03/23/22 1500 03/23/22 1600 03/23/22 1700 03/23/22 1800   BP:   111/88 107/73   Pulse: 65 79 (!) 54 61         Visual Acuity      ED Medications  Medications   iohexol (OMNIPAQUE) 350 MG/ML injection (SINGLE-DOSE) 85 mL (85 mL Intravenous Given 3/23/22 1530)   aspirin chewable tablet 324 mg (324 mg Oral Given 3/23/22 1759)   heparin (porcine) injection 4,000 Units (4,000 Units Intravenous Given 3/23/22 1804)       Diagnostic Studies  Results Reviewed     Procedure Component Value Units Date/Time    HS Troponin I 4hr [[id number]]  (Abnormal) Collected: 03/23/22 1648    Lab Status: Final result Specimen: Blood from Arm, Right Updated: 03/23/22 1725     hs TnI 4hr 2,433 ng/L Delta 4hr hsTnI 2,402 ng/L     CBC [[id number]]     Lab Status: No result Specimen: Blood     Toxicology screen, urine [[id number]]     Lab Status: No result Specimen: Urine     Protime-INR [[id number]]  (Normal) Collected: 03/23/22 1434    Lab Status: Final result Specimen: Blood from Arm, Right Updated: 03/23/22 1555     Protime 13 9 seconds      INR 1 08    APTT [[id number]]  (Normal) Collected: 03/23/22 1434    Lab Status: Final result Specimen: Blood from Arm, Right Updated: 03/23/22 1555     PTT 33 seconds     POCT pregnancy, urine [[id number]]  (Normal) Resulted: 03/23/22 1520    Lab Status: Final result Updated: 03/23/22 1521     EXT PREG TEST UR (Ref: Negative) negative     Control valid    HS Troponin I 2hr [[id number]]  (Abnormal) Collected: 03/23/22 1434    Lab Status: Final result Specimen: Blood from Arm, Right Updated: 03/23/22 1503     hs TnI 2hr 1,051 ng/L      Delta 2hr hsTnI 1,020 ng/L     D-dimer, quantitative [[id number]]  (Abnormal) Collected: 03/23/22 1434    Lab Status: Final result Specimen: Blood from Arm, Right Updated: 03/23/22 1456     D-Dimer, Quant 0 57 ug/ml FEU     TSH [[id number]]  (Normal) Collected: 03/23/22 1201    Lab Status: Final result Specimen: Blood from Arm, Right Updated: 03/23/22 1241     TSH 3RD GENERATON 1 170 uIU/mL     Narrative:      Patients undergoing fluorescein dye angiography may retain small amounts of fluorescein in the body for 48-72 hours post procedure  Samples containing fluorescein can produce falsely depressed TSH values  If the patient had this procedure,a specimen should be resubmitted post fluorescein clearance        HS Troponin 0hr (reflex protocol) [[id number]]  (Normal) Collected: 03/23/22 1201    Lab Status: Final result Specimen: Blood from Arm, Right Updated: 03/23/22 1233     hs TnI 0hr 31 ng/L     Comprehensive metabolic panel [[id number]]  (Abnormal) Collected: 03/23/22 1201    Lab Status: Final result Specimen: Blood from Arm, Right Updated: 03/23/22 1224     Sodium 139 mmol/L      Potassium 4 0 mmol/L      Chloride 109 mmol/L      CO2 21 mmol/L      ANION GAP 9 mmol/L      BUN 19 mg/dL      Creatinine 1 07 mg/dL      Glucose 85 mg/dL      Calcium 9 2 mg/dL      AST 20 U/L      ALT 18 U/L      Alkaline Phosphatase 66 U/L      Total Protein 7 2 g/dL      Albumin 4 4 g/dL      Total Bilirubin 0 41 mg/dL      eGFR 74 ml/min/1 73sq m     Narrative:      National Kidney Disease Foundation guidelines for Chronic Kidney Disease (CKD):     Stage 1 with normal or high GFR (GFR > 90 mL/min/1 73 square meters)    Stage 2 Mild CKD (GFR = 60-89 mL/min/1 73 square meters)    Stage 3A Moderate CKD (GFR = 45-59 mL/min/1 73 square meters)    Stage 3B Moderate CKD (GFR = 30-44 mL/min/1 73 square meters)    Stage 4 Severe CKD (GFR = 15-29 mL/min/1 73 square meters)    Stage 5 End Stage CKD (GFR <15 mL/min/1 73 square meters)  Note: GFR calculation is accurate only with a steady state creatinine    Magnesium [[id number]]  (Normal) Collected: 03/23/22 1201    Lab Status: Final result Specimen: Blood from Arm, Right Updated: 03/23/22 1224     Magnesium 2 0 mg/dL     CBC and differential [[id number]] Collected: 03/23/22 1201    Lab Status: Final result Specimen: Blood from Arm, Right Updated: 03/23/22 1206     WBC 6 14 Thousand/uL      RBC 4 55 Million/uL      Hemoglobin 14 3 g/dL      Hematocrit 42 7 %      MCV 94 fL      MCH 31 4 pg      MCHC 33 5 g/dL      RDW 12 6 %      MPV 10 4 fL      Platelets 431 Thousands/uL      nRBC 0 /100 WBCs      Neutrophils Relative 66 %      Immat GRANS % 0 %      Lymphocytes Relative 25 %      Monocytes Relative 7 %      Eosinophils Relative 2 %      Basophils Relative 0 %      Neutrophils Absolute 4 05 Thousands/µL      Immature Grans Absolute 0 01 Thousand/uL      Lymphocytes Absolute 1 52 Thousands/µL      Monocytes Absolute 0 45 Thousand/µL      Eosinophils Absolute 0 09 Thousand/µL      Basophils Absolute 0 02 Thousands/µL CTA ED chest PE Study   Final Result by Ara Washington DO (03/23 1603)   Addendum 1 of 1 by Ara Washington DO (03/23 1603)   ADDENDUM:   I personally discussed this study with Dr Sterling Shafer on 3/23/2022 at 3:58 PM                   Final      No central pulmonary emboli  Workstation performed: [id number]         X-ray chest 1 view portable   Final Result by Ara Washington DO (03/23 2876)      No acute cardiopulmonary disease                    Workstation performed: [id number]                    Procedures  ECG 12 Lead Documentation Only    Date/Time: 3/23/2022 12:00 PM  Performed by: SVEN Bhakta  Authorized by: SVEN Bhakta     Indications / Diagnosis:  Chest pain  ECG reviewed by me, the ED Provider: yes    Patient location:  ED  Previous ECG:     Previous ECG:  Unavailable  Interpretation:     Interpretation: normal    Rate:     ECG rate:  67    ECG rate assessment: normal    Rhythm:     Rhythm: sinus rhythm    Ectopy:     Ectopy: none    QRS:     QRS axis:  Normal  Conduction:     Conduction: normal    ST segments:     ST segments:  Normal  T waves:     T waves: inverted      Inverted:  III, V3 and V1  ECG 12 Lead Documentation Only    Date/Time: 3/23/2022 2:40 PM  Performed by: SVEN Bhakta  Authorized by: SVEN Bhakta     Indications / Diagnosis:  Chest pain  Patient location:  ED  Previous ECG:     Previous ECG:  Compared to current    Similarity:  No change  Interpretation:     Interpretation: abnormal    Rate:     ECG rate:  67    ECG rate assessment: normal    Rhythm:     Rhythm: sinus rhythm    Ectopy:     Ectopy: none    QRS:     QRS axis:  Normal  Conduction:     Conduction: normal    ST segments:     ST segments:  Normal  T waves:     T waves: inverted      Inverted:  III, V3 and V1  ECG 12 Lead Documentation Only    Date/Time: 3/23/2022 7:27 PM  Performed by: SVEN Bhakta  Authorized by: SVEN Bhakta Indications / Diagnosis:  Chest pain   ECG reviewed by me, the ED Provider: yes    Patient location:  ED  Previous ECG:     Previous ECG:  Compared to current    Comparison ECG info:  63    Similarity:  No change  Interpretation:     Interpretation: normal    Rate:     ECG rate:  63    ECG rate assessment: normal    Rhythm:     Rhythm: sinus rhythm    Ectopy:     Ectopy: none    QRS:     QRS axis:  Normal  Conduction:     Conduction: normal    ST segments:     ST segments:  Normal  T waves:     T waves: inverted      Inverted:  III, V3 and V1             ED Course  ED Course as of 03/23/22 1944   Wed Mar 23, 2022   1232 hs TnI 0hr: 31  Will check 2 hour troponin  1254 No cardiac events appreciated since arriving to the ED  Patient has been on telemetry since arriving  1531 After increase to troponin, consulted cardiology, Dr Pritchett at this time  1556 Per recommendation, pt to be started on ACS low therapy, and given 324 mg ASA PO     1624 Per additional verbal recommendation, pt receiving ECHO, bedside  1648 hs TnI 4hr(!): 2,433  Pt has had heparin initiated already  Pt to be transferred      1655 Patient to be transferred to [facility] tele under Dr Karly Garcia               HEART Risk Score      Most Recent Value   Heart Score Risk Calculator    History 0 Filed at: 03/23/2022 1812   ECG 1 Filed at: 03/23/2022 1812   Age 0 Filed at: 03/23/2022 1812   Risk Factors 1 Filed at: 03/23/2022 1812   Troponin 2 Filed at: 03/23/2022 1812   HEART Score 4 Filed at: 03/23/2022 1812                PERC Rule for PE      Most Recent Value   PERC Rule for PE    Age >=50 0 Filed at: 03/23/2022 1136   HR >=100 0 Filed at: 03/23/2022 1136   O2 Sat on room air < 95% 0 Filed at: 03/23/2022 1136   History of PE or DVT 0 Filed at: 03/23/2022 1136   Recent trauma or surgery 0 Filed at: 03/23/2022 1136   Hemoptysis 0 Filed at: 03/23/2022 1136   Exogenous estrogen 0 Filed at: 03/23/2022 1136   Unilateral leg swelling 0 Filed at: 03/23/2022 1136   [address] for PE Results 0 Filed at: 03/23/2022 1136                            MDM  Number of Diagnoses or Management Options  Chest pain  Elevated d-dimer  Elevated troponin  Diagnosis management comments: DDx: Hypo/hyperthyroidism, ACS, PE  Will check TSH, cardiac labs, EKG, chest x-ray  Pt has a PERC of 0  Pt declining pain medication  Due to troponin of 31, and Twave inversions as documented, will check DDimer in addition to second troponin  Twave inversions were previously present on November 2020 EKG  Pt had a marked increase to her troponin after the 2H edmundo  Pt has no increase to pain since arriving, rating it a 2/10  Pt's DDimer positive  CT PE study performed  No PE visualized by radiologist  Spenser Mercado spoke with radiologist personally  Cardiologist Dr Pritchett recommendation appreciated  Pt in agreement of plan for transfer to [facility] for ischemia work up  Pt's third EKG remains unchanged from previous ekgs, while her 3rd trop is increased  Pt has had no events on cardiac monitor, no episodes of hypotension, no episodes of hypoxia since arrival  Pain remains a 2/10  Pt has heparin initiated  Pt remained without need for pain medication since arrival    Patient received echo in department per recommendation of Dr Miguel Ángel Palomares  Amount and/or Complexity of Data Reviewed  Clinical lab tests: ordered and reviewed  Tests in the radiology section of CPT®: ordered and reviewed  Obtain history from someone other than the patient: (mother)  Discuss the patient with other providers: ([facility]  )    Risk of Complications, Morbidity, and/or Mortality  General comments: Due to pt's EKG, and increasing troponins, pt is being transferred at the recommendation of Dr Pritchett  Pt being transferred to [address] [place] [address]  Pt in agreement to transfer plan       Patient Progress  Patient progress: stable      Disposition  Final diagnoses:   Chest pain   Elevated troponin   Elevated d-dimer Time reflects when diagnosis was documented in both MDM as applicable and the Disposition within this note     Time User Action Codes Description Comment    3/23/2022  6:05 PM Bal Cabrera Add [R07 9] Chest pain     3/23/2022  6:05 PM Bal Cabrera Add [R77 8] Elevated troponin     3/23/2022  6:05 PM Bal Cabrera Add [R79 89] Elevated d-dimer       ED Disposition     ED Disposition Condition Date/Time Comment    Transfer to Another Facility-In Network  Wed Mar 23, 2022  6:05 PM Garrison Silva should be transferred out to [facility] with Dr leonardo JAVIER Documentation      Most Recent Value   Patient Condition The patient has been stabilized such that within reasonable medical probability, no material deterioration of the patient condition or the condition of the unborn child(alma delia) is likely to result from the transfer   Reason for Transfer Level of Care needed not available at this facility   Benefits of Transfer Specialized equipment and/or services available at the receiving facility (Include comment)________________________  [facility]]   Risks of Transfer Potential for delay in receiving treatment, Potential deterioration of medical condition, Loss of IV, Increased discomfort during transfer   Accepting Physician prechtal   Accepting Facility Name, [address] [place] [address]   Provider Certification General risk, such as traffic hazards, adverse weather conditions, rough terrain or turbulence, possible failure of equipment (including vehicle or aircraft), or consequences of actions of persons outside the control of the transport personnel, Unanticipated needs of medical equipment and personnel during transport, The possibility of a transport vehicle being unavailable, Risk of worsening condition, Consent was not obtained as patient is committed to psychiatric facility and transfer is mandated      RN Documentation      Most [address] [facility] [address] Name, [address] [place] [address] [place]   Transport Mode Ambulance   Level of Care Advanced life support      Follow-up Information    None         Discharge Medication List as of 3/23/2022  7:00 PM      CONTINUE these medications which have NOT CHANGED    Details   !! albuterol (ProAir HFA) 90 mcg/act inhaler Inhale 2 puffs every 6 (six) hours as needed (exercise induced asthma), Starting Wed 9/15/2021, Normal      topiramate (TOPAMAX) 100 mg tablet Take 100 mg by mouth 2 (two) times a day, Starting Mon 9/13/2021, Historical Med      !! albuterol (PROVENTIL HFA,VENTOLIN HFA) 90 mcg/act inhaler Inhale 2 puffs every 6 (six) hours as needed for wheezing, Historical Med      amitriptyline (ELAVIL) 50 mg tablet Take 1 tablet (50 mg total) by mouth daily at bedtime, Starting Wed 7/10/2019, Normal      medroxyPROGESTERone acetate (DEPO-PROVERA SYRINGE) 150 mg/mL injection inject intramuscularly EVERY THREE MONTHS, Historical Med      RA VITAMIN D-3 50 MCG (2000 UT) CAPS Take 1 capsule by mouth daily, Starting Fri 10/18/2019, Historical Med       !! - Potential duplicate medications found  Please discuss with provider  No discharge procedures on file      PDMP Review     None          ED Provider  Electronically Signed by           SVEN Watson  03/23/22 [address]  03/24/22 5219

## 2022-03-24 PROBLEM — R79.89 ELEVATED SERUM CREATININE: Status: ACTIVE | Noted: 2022-03-24

## 2022-03-24 PROBLEM — R79.89 ELEVATED TROPONIN: Status: ACTIVE | Noted: 2022-03-23

## 2022-03-24 PROBLEM — E78.00 ELEVATED LDL CHOLESTEROL LEVEL: Status: ACTIVE | Noted: 2022-03-24

## 2022-03-24 PROBLEM — R77.8 ELEVATED TROPONIN: Status: ACTIVE | Noted: 2022-03-23

## 2022-03-24 LAB
ALBUMIN SERPL BCP-MCNC: 3.6 G/DL (ref 3.5–5)
ALP SERPL-CCNC: 69 U/L (ref 46–116)
ALT SERPL W P-5'-P-CCNC: 21 U/L (ref 12–78)
AMPHETAMINES SERPL QL SCN: NEGATIVE
ANION GAP SERPL CALCULATED.3IONS-SCNC: 10 MMOL/L (ref 4–13)
ANION GAP SERPL CALCULATED.3IONS-SCNC: 9 MMOL/L (ref 4–13)
APTT PPP: 101 SECONDS (ref 23–37)
APTT PPP: 35 SECONDS (ref 23–37)
AST SERPL W P-5'-P-CCNC: 31 U/L (ref 5–45)
ATRIAL RATE: 59 BPM
ATRIAL RATE: 63 BPM
BACTERIA UR QL AUTO: ABNORMAL /HPF
BARBITURATES UR QL: NEGATIVE
BASOPHILS # BLD AUTO: 0.05 THOUSANDS/ΜL (ref 0–0.1)
BASOPHILS NFR BLD AUTO: 1 % (ref 0–1)
BENZODIAZ UR QL: NEGATIVE
BILIRUB SERPL-MCNC: 0.37 MG/DL (ref 0.2–1)
BILIRUB UR QL STRIP: NEGATIVE
BUN SERPL-MCNC: 19 MG/DL (ref 5–25)
BUN SERPL-MCNC: 22 MG/DL (ref 5–25)
CALCIUM SERPL-MCNC: 8.4 MG/DL (ref 8.3–10.1)
CALCIUM SERPL-MCNC: 8.8 MG/DL (ref 8.3–10.1)
CARDIAC TROPONIN I PNL SERPL HS: 3866 NG/L (ref 8–18)
CHLORIDE SERPL-SCNC: 110 MMOL/L (ref 100–108)
CHLORIDE SERPL-SCNC: 111 MMOL/L (ref 100–108)
CLARITY UR: CLEAR
CO2 SERPL-SCNC: 21 MMOL/L (ref 21–32)
CO2 SERPL-SCNC: 24 MMOL/L (ref 21–32)
COCAINE UR QL: NEGATIVE
COLOR UR: YELLOW
CREAT SERPL-MCNC: 0.98 MG/DL (ref 0.6–1.3)
CREAT SERPL-MCNC: 1.32 MG/DL (ref 0.6–1.3)
CRP SERPL QL: <3 MG/L
EOSINOPHIL # BLD AUTO: 0.19 THOUSAND/ΜL (ref 0–0.61)
EOSINOPHIL NFR BLD AUTO: 3 % (ref 0–6)
ERYTHROCYTE [DISTWIDTH] IN BLOOD BY AUTOMATED COUNT: 12.6 % (ref 11.6–15.1)
ERYTHROCYTE [SEDIMENTATION RATE] IN BLOOD: <1 MM/HOUR (ref 0–19)
GFR SERPL CREATININE-BSD FRML MDRD: 58 ML/MIN/1.73SQ M
GFR SERPL CREATININE-BSD FRML MDRD: 83 ML/MIN/1.73SQ M
GLUCOSE SERPL-MCNC: 82 MG/DL (ref 65–140)
GLUCOSE SERPL-MCNC: 88 MG/DL (ref 65–140)
GLUCOSE UR STRIP-MCNC: NEGATIVE MG/DL
HCT VFR BLD AUTO: 40.1 % (ref 34.8–46.1)
HGB BLD-MCNC: 13.2 G/DL (ref 11.5–15.4)
HGB UR QL STRIP.AUTO: NEGATIVE
IMM GRANULOCYTES # BLD AUTO: 0.01 THOUSAND/UL (ref 0–0.2)
IMM GRANULOCYTES NFR BLD AUTO: 0 % (ref 0–2)
KETONES UR STRIP-MCNC: ABNORMAL MG/DL
LDLC SERPL DIRECT ASSAY-MCNC: 97 MG/DL (ref 0–100)
LEUKOCYTE ESTERASE UR QL STRIP: NEGATIVE
LYMPHOCYTES # BLD AUTO: 3.03 THOUSANDS/ΜL (ref 0.6–4.47)
LYMPHOCYTES NFR BLD AUTO: 44 % (ref 14–44)
MCH RBC QN AUTO: 31.1 PG (ref 26.8–34.3)
MCHC RBC AUTO-ENTMCNC: 32.9 G/DL (ref 31.4–37.4)
MCV RBC AUTO: 94 FL (ref 82–98)
METHADONE UR QL: NEGATIVE
MONOCYTES # BLD AUTO: 0.58 THOUSAND/ΜL (ref 0.17–1.22)
MONOCYTES NFR BLD AUTO: 9 % (ref 4–12)
NEUTROPHILS # BLD AUTO: 2.97 THOUSANDS/ΜL (ref 1.85–7.62)
NEUTS SEG NFR BLD AUTO: 43 % (ref 43–75)
NITRITE UR QL STRIP: NEGATIVE
NON-SQ EPI CELLS URNS QL MICRO: ABNORMAL /HPF
NRBC BLD AUTO-RTO: 0 /100 WBCS
OPIATES UR QL SCN: NEGATIVE
OXYCODONE+OXYMORPHONE UR QL SCN: NEGATIVE
P AXIS: 48 DEGREES
P AXIS: 61 DEGREES
PCP UR QL: NEGATIVE
PH UR STRIP.AUTO: 6.5 [PH]
PLATELET # BLD AUTO: 249 THOUSANDS/UL (ref 149–390)
PMV BLD AUTO: 10.5 FL (ref 8.9–12.7)
POTASSIUM SERPL-SCNC: 3.7 MMOL/L (ref 3.5–5.3)
POTASSIUM SERPL-SCNC: 4.3 MMOL/L (ref 3.5–5.3)
PR INTERVAL: 148 MS
PR INTERVAL: 150 MS
PROT SERPL-MCNC: 6.6 G/DL (ref 6.4–8.2)
PROT UR STRIP-MCNC: NEGATIVE MG/DL
QRS AXIS: 38 DEGREES
QRS AXIS: 63 DEGREES
QRSD INTERVAL: 72 MS
QRSD INTERVAL: 72 MS
QT INTERVAL: 458 MS
QT INTERVAL: 462 MS
QTC INTERVAL: 457 MS
QTC INTERVAL: 468 MS
RBC # BLD AUTO: 4.25 MILLION/UL (ref 3.81–5.12)
RBC #/AREA URNS AUTO: ABNORMAL /HPF
SODIUM SERPL-SCNC: 142 MMOL/L (ref 136–145)
SODIUM SERPL-SCNC: 143 MMOL/L (ref 136–145)
SP GR UR STRIP.AUTO: 1.02 (ref 1–1.03)
T WAVE AXIS: 20 DEGREES
T WAVE AXIS: 23 DEGREES
THC UR QL: NEGATIVE
UROBILINOGEN UR QL STRIP.AUTO: 0.2 E.U./DL
VENTRICULAR RATE: 59 BPM
VENTRICULAR RATE: 63 BPM
WBC # BLD AUTO: 6.83 THOUSAND/UL (ref 4.31–10.16)
WBC #/AREA URNS AUTO: ABNORMAL /HPF

## 2022-03-24 PROCEDURE — 99152 MOD SED SAME PHYS/QHP 5/>YRS: CPT | Performed by: INTERNAL MEDICINE

## 2022-03-24 PROCEDURE — 99232 SBSQ HOSP IP/OBS MODERATE 35: CPT | Performed by: INTERNAL MEDICINE

## 2022-03-24 PROCEDURE — 80307 DRUG TEST PRSMV CHEM ANLYZR: CPT | Performed by: PHYSICIAN ASSISTANT

## 2022-03-24 PROCEDURE — C1769 GUIDE WIRE: HCPCS | Performed by: INTERNAL MEDICINE

## 2022-03-24 PROCEDURE — C1894 INTRO/SHEATH, NON-LASER: HCPCS | Performed by: INTERNAL MEDICINE

## 2022-03-24 PROCEDURE — 93454 CORONARY ARTERY ANGIO S&I: CPT | Performed by: INTERNAL MEDICINE

## 2022-03-24 PROCEDURE — 85652 RBC SED RATE AUTOMATED: CPT | Performed by: STUDENT IN AN ORGANIZED HEALTH CARE EDUCATION/TRAINING PROGRAM

## 2022-03-24 PROCEDURE — 99223 1ST HOSP IP/OBS HIGH 75: CPT | Performed by: INTERNAL MEDICINE

## 2022-03-24 PROCEDURE — 93010 ELECTROCARDIOGRAM REPORT: CPT | Performed by: INTERNAL MEDICINE

## 2022-03-24 PROCEDURE — 83721 ASSAY OF BLOOD LIPOPROTEIN: CPT | Performed by: STUDENT IN AN ORGANIZED HEALTH CARE EDUCATION/TRAINING PROGRAM

## 2022-03-24 PROCEDURE — 4A023N7 MEASUREMENT OF CARDIAC SAMPLING AND PRESSURE, LEFT HEART, PERCUTANEOUS APPROACH: ICD-10-PCS | Performed by: INTERNAL MEDICINE

## 2022-03-24 PROCEDURE — 81001 URINALYSIS AUTO W/SCOPE: CPT | Performed by: NURSE PRACTITIONER

## 2022-03-24 PROCEDURE — 85730 THROMBOPLASTIN TIME PARTIAL: CPT | Performed by: HOSPITALIST

## 2022-03-24 PROCEDURE — 93010 ELECTROCARDIOGRAM REPORT: CPT

## 2022-03-24 PROCEDURE — NC001 PR NO CHARGE: Performed by: INTERNAL MEDICINE

## 2022-03-24 PROCEDURE — 86140 C-REACTIVE PROTEIN: CPT | Performed by: STUDENT IN AN ORGANIZED HEALTH CARE EDUCATION/TRAINING PROGRAM

## 2022-03-24 PROCEDURE — 93005 ELECTROCARDIOGRAM TRACING: CPT

## 2022-03-24 PROCEDURE — 85025 COMPLETE CBC W/AUTO DIFF WBC: CPT | Performed by: HOSPITALIST

## 2022-03-24 PROCEDURE — 80048 BASIC METABOLIC PNL TOTAL CA: CPT | Performed by: HOSPITALIST

## 2022-03-24 PROCEDURE — 80053 COMPREHEN METABOLIC PANEL: CPT | Performed by: NURSE PRACTITIONER

## 2022-03-24 PROCEDURE — 84484 ASSAY OF TROPONIN QUANT: CPT | Performed by: HOSPITALIST

## 2022-03-24 RX ORDER — MIDAZOLAM HYDROCHLORIDE 2 MG/2ML
INJECTION, SOLUTION INTRAMUSCULAR; INTRAVENOUS AS NEEDED
Status: DISCONTINUED | OUTPATIENT
Start: 2022-03-24 | End: 2022-03-24 | Stop reason: HOSPADM

## 2022-03-24 RX ORDER — LIDOCAINE HYDROCHLORIDE 10 MG/ML
INJECTION, SOLUTION EPIDURAL; INFILTRATION; INTRACAUDAL; PERINEURAL AS NEEDED
Status: DISCONTINUED | OUTPATIENT
Start: 2022-03-24 | End: 2022-03-24 | Stop reason: HOSPADM

## 2022-03-24 RX ORDER — FENTANYL CITRATE 50 UG/ML
INJECTION, SOLUTION INTRAMUSCULAR; INTRAVENOUS AS NEEDED
Status: DISCONTINUED | OUTPATIENT
Start: 2022-03-24 | End: 2022-03-24 | Stop reason: HOSPADM

## 2022-03-24 RX ORDER — VERAPAMIL HCL 2.5 MG/ML
AMPUL (ML) INTRAVENOUS AS NEEDED
Status: DISCONTINUED | OUTPATIENT
Start: 2022-03-24 | End: 2022-03-24 | Stop reason: HOSPADM

## 2022-03-24 RX ORDER — HEPARIN SODIUM 1000 [USP'U]/ML
INJECTION, SOLUTION INTRAVENOUS; SUBCUTANEOUS AS NEEDED
Status: DISCONTINUED | OUTPATIENT
Start: 2022-03-24 | End: 2022-03-24 | Stop reason: HOSPADM

## 2022-03-24 RX ORDER — SODIUM CHLORIDE 9 MG/ML
150 INJECTION, SOLUTION INTRAVENOUS CONTINUOUS
Status: DISPENSED | OUTPATIENT
Start: 2022-03-24 | End: 2022-03-24

## 2022-03-24 RX ORDER — ASPIRIN 81 MG/1
324 TABLET, CHEWABLE ORAL ONCE
Status: COMPLETED | OUTPATIENT
Start: 2022-03-24 | End: 2022-03-24

## 2022-03-24 RX ORDER — SODIUM CHLORIDE 9 MG/ML
125 INJECTION, SOLUTION INTRAVENOUS CONTINUOUS
Status: DISCONTINUED | OUTPATIENT
Start: 2022-03-24 | End: 2022-03-25 | Stop reason: HOSPADM

## 2022-03-24 RX ORDER — COLCHICINE 0.6 MG/1
0.6 TABLET ORAL 2 TIMES DAILY
Status: DISCONTINUED | OUTPATIENT
Start: 2022-03-24 | End: 2022-03-25 | Stop reason: HOSPADM

## 2022-03-24 RX ORDER — NITROGLYCERIN 20 MG/100ML
INJECTION INTRAVENOUS AS NEEDED
Status: DISCONTINUED | OUTPATIENT
Start: 2022-03-24 | End: 2022-03-24 | Stop reason: HOSPADM

## 2022-03-24 RX ADMIN — ASPIRIN 81 MG CHEWABLE TABLET 324 MG: 81 TABLET CHEWABLE at 11:54

## 2022-03-24 RX ADMIN — ACETAMINOPHEN 650 MG: 325 TABLET ORAL at 16:26

## 2022-03-24 RX ADMIN — COLCHICINE 0.6 MG: 0.6 TABLET, FILM COATED ORAL at 20:25

## 2022-03-24 RX ADMIN — SODIUM CHLORIDE 125 ML/HR: 0.9 INJECTION, SOLUTION INTRAVENOUS at 09:29

## 2022-03-24 RX ADMIN — TOPIRAMATE 150 MG: 100 TABLET, FILM COATED ORAL at 17:27

## 2022-03-24 RX ADMIN — SODIUM CHLORIDE 100 ML/HR: 0.9 INJECTION, SOLUTION INTRAVENOUS at 05:01

## 2022-03-24 RX ADMIN — SODIUM CHLORIDE 150 ML/HR: 0.9 INJECTION, SOLUTION INTRAVENOUS at 13:00

## 2022-03-24 RX ADMIN — TOPIRAMATE 150 MG: 100 TABLET, FILM COATED ORAL at 09:28

## 2022-03-24 RX ADMIN — HEPARIN SODIUM 4000 UNITS: 1000 INJECTION INTRAVENOUS; SUBCUTANEOUS at 04:47

## 2022-03-24 RX ADMIN — MELATONIN 6 MG: at 21:55

## 2022-03-24 NOTE — UTILIZATION REVIEW
Initial Clinical Review    Admission: Date/Time/Statement:   Admission Orders (From admission, onward)     Ordered        03/23/22 1940  Inpatient Admission  Once                      Orders Placed This Encounter   Procedures    Inpatient Admission     Standing Status:   Standing     Number of Occurrences:   1     Order Specific Question:   Level of Care     Answer:   Med Surg [16]     Order Specific Question:   Estimated length of stay     Answer:   More than 2 Midnights     Order Specific Question:   Certification     Answer:   I certify that inpatient services are medically necessary for this patient for a duration of greater than two midnights  See H&P and MD Progress Notes for additional information about the patient's course of treatment  Initial Presentation: 23 y o  female who presents as a transfer from the ED [facility] to [address] [place] [address] for possible cardiac cath  Presented to their ED with c/o chest pain rate 8/10 with SOB, increased fatigue d/t sports, intermittent palpitations x several weeks, elevated troponin levels  Notes 2 prior episodes of syncope 3-4 yrs ago  PMH: exercise induced asthma, migraines, anxiety  On arrival pt;s chest pain is nearly resolved - rated 1/10 and it improves with lying still   + headache  On exam she has no significant findings  Imaging w/o PE   ECG w/o acute ST changes  She is admitted to INPATIENT status with NSTEMI - ASA, UDS is negative, Lipid profil, heparin drip, Cardio consult, NPO p MN, Tele  Asthma - PRN Albuterol  Migraine - Topamax  Date: 3/24   Day 2:   Pt is on Heparin drip and NPO for cardiac cath today  She is pain free  Continue IV fluids  Pt reports while playing softball this week she dove into the base plate incorrectly  No other changes this week  3/24 Cardio Consult - precordial CP, Elevated troponin, SOB, palpitations, LVEF 50%, covid 1/22, h/o suspected vasovegal syncope - will do cath today    ASA now, IV fluids, more recommendations post cath        · 3/24 Cardiac Cath - Normal coronary arteries     Wt Readings from Last 1 Encounters:   03/24/22 72 3 kg (159 lb 4 8 oz)     Additional Vital Signs:   03/24/22 0742 96 8 °F (36 °C) Abnormal  54 Abnormal  18 94/52 67 100 % None (Room air) Lying   03/24/22 0333 97 °F (36 1 °C) Abnormal  55 18 102/65 70 100 % None (Room air) Lying   03/23/22 2357 97 2 °F (36 2 °C) Abnormal  57 18 96/55 67 100 % None (Room air) Lying   03/23/22 2027 97 1 °F (36 2 °C) Abnormal  76 18 116/68 87 100 % None (Room air) Lying     Pertinent Labs/Diagnostic Test Results:     Results from last 7 days   Lab Units 03/24/22  0327 03/23/22  1201   WBC Thousand/uL 6 83 6 14   HEMOGLOBIN g/dL 13 2 14 3   HEMATOCRIT % 40 1 42 7   PLATELETS Thousands/uL 249 265   NEUTROS ABS Thousands/µL 2 97 4 05         Results from last 7 days   Lab Units 03/24/22  0328 03/23/22  1201   SODIUM mmol/L 143 139   POTASSIUM mmol/L 3 7 4 0   CHLORIDE mmol/L 110* 109*   CO2 mmol/L 24 21   ANION GAP mmol/L 9 9   BUN mg/dL 22 19   CREATININE mg/dL 1 32* 1 07   EGFR ml/min/1 73sq m 58 74   CALCIUM mg/dL 8 8 9 2   MAGNESIUM mg/dL  --  2 0     Results from last 7 days   Lab Units 03/23/22  1201   AST U/L 20   ALT U/L 18   ALK PHOS U/L 66   TOTAL PROTEIN g/dL 7 2   ALBUMIN g/dL 4 4   TOTAL BILIRUBIN mg/dL 0 41         Results from last 7 days   Lab Units 03/24/22  0328 03/23/22  1201   GLUCOSE RANDOM mg/dL 88 85         Results from last 7 days   Lab Units 03/23/22 2008   HEMOGLOBIN A1C % 5 0   EAG mg/dl 97     Results from last 7 days   Lab Units 03/23/22  1648 03/23/22  1434 03/23/22  1201   HS TNI 0HR ng/L  --   --  31   HS TNI 2HR ng/L  --  1,051*  --    HSTNI D2 ng/L  --  1,020*  --    HS TNI 4HR ng/L 2,433*  --   --    HSTNI D4 ng/L 2,402*  --   --      Results from last 7 days   Lab Units 03/23/22  1434   D-DIMER QUANTITATIVE ug/ml FEU 0 57*     Results from last 7 days   Lab Units 03/24/22  0328 03/23/22 2008 03/23/22  1434   PROTIME seconds  --   --  13 9   INR   --   --  1 08   PTT seconds 35 135* 33     Results from last 7 days   Lab Units 03/23/22  1201   TSH 3RD GENERATON uIU/mL 1 170     Results from last 7 days   Lab Units 03/24/22  0328   CRP mg/L <3 0         Results from last 7 days   Lab Units 03/24/22  0539   AMPH/METH  Negative   BARBITURATE UR  Negative   BENZODIAZEPINE UR  Negative   COCAINE UR  Negative   METHADONE URINE  Negative   OPIATE UR  Negative   PCP UR  Negative   THC UR  Negative     Past Medical History:   Diagnosis Date    Allergies     Asthma     Migraine      Present on Admission:   Migraine without aura and without status migrainosus, not intractable    Admitting Diagnosis: Chest pain [R07 9]  Age/Sex: 23 y o  female  Admission Orders:  Scheduled Medications:  aspirin, 324 mg, Oral, Once  melatonin, 6 mg, Oral, HS  topiramate, 150 mg, Oral, BID      Continuous IV Infusions:  heparin (porcine), 3-20 Units/kg/hr (Order-Specific), Intravenous, Titrated  sodium chloride, 125 mL/hr, Intravenous, Continuous      PRN Meds:  acetaminophen, 650 mg, Oral, Q4H PRN  albuterol, 2 puff, Inhalation, Q6H PRN  calcium carbonate, 1,000 mg, Oral, Daily PRN  heparin (porcine), 2,000 Units, Intravenous, Q1H PRN  heparin (porcine), 4,000 Units, Intravenous, Q1H PRN - x 1 3/24  nitroglycerin, 0 4 mg, Sublingual, Q5 Min PRN  Motrin 400 mg x 1 PO 3/23 and d/c   ondansetron, 4 mg, Intravenous, Q6H PRN    Tele  NPO for cardiac cath  Vitals q 4 hr  IP CONSULT TO CARDIOLOGY    Network Utilization Review Department  ATTENTION: Please call with any questions or concerns to [phone] and carefully listen to the prompts so that you are directed to the right person  All voicemails are confidential   Florence Koo all requests for admission clinical reviews, approved or denied determinations and any other requests to dedicated fax number below belonging to the campus where the patient is receiving treatment   List of dedicated fax numbers for the Facilities:  FACILITY NAME UR FAX NUMBER   ADMISSION DENIALS (Administrative/Medical Necessity) [phone]   [address] (Maternity/NICU/Pediatrics) [address] [facility] [facility] [address] [place] [address]  [phone]   [address] [address] [place] [address] [address] [place] [facility] [address] [facility] [phone]

## 2022-03-24 NOTE — ASSESSMENT & PLAN NOTE
Recent CT with dye procedure  Monitor renal function closely  Repeat labs in a marianna Qiu   Continue IV fluid hydration, increase to 125 mL/hour  Avoid hypotension  Avoid nephrotoxic agent

## 2022-03-24 NOTE — CONSULTS
Consult - Cardiology   Shawna Cosme 23 y o  female MRN: [id number]  Unit/Bed#: E4 -01 Encounter: [id number]        Reason For Consult:  Elevated troponin             ASSESSMENT:  - Precordial chest pain  - Elevated troponin   - Shortness of breath   - Palpitations   - LVEF 50%, RV mildly dilated, trace MR/TR/NV, 3/23/22  - Elevated serum creatinine  - Asthma  - Migraines  - Anxiety   - History of COVID, January 2022  - History of syncope, suspected vasovagal     Familial history of CAD: maternal and paternal grandparent with MI in early 63's      PLAN/ DISCUSSION:     · CTA upon arrival revealed no central pulmonary emboli  · Elevated troponin with high sensitivity troponin trend: 31, 1051, 2433, 3035, 3866  · Given precordial chest pain, elevated troponin, will proceed with cardiac catheterization  · Continued on heparin infusion per ACS protocol  · Will provide aspirin 324 mg now  · Continue IVF hydration pre- procedure; plan to provide post procedure as well  · In light of elevated creatinine, will repeat labs STAT  If renal function has not improved, may plan to consider procedure tomorrow  · If renal function does not improve with IVF hydration, may need to consider other causes of renal dysfunction  Recommend to check UA  · Recommendations forthcoming pending cardiac catheterization results  · Consider rheumatologic workup pending cardiac catheterization results  · Monitor volume status with strict intake/output, daily weight  · Monitor renal function and electrolytes closely; maintain potassium > 4, magnesium > 2  History Of Present Illness:  79-year-old female initially presented to [facility] with complaints of precordial chest pain with associated shortness of breath  Per patient, she was resting in bed yesterday morning   When she stood from the bed, she developed sudden onset sharp precordial chest pain with associated shortness of breath, slight headache and lightheadedness  She rated the initial chest pain as an 8/10 in severity  She states that the chest pain had gotten worse with exertion  She denied resolution of the pain with rest as the pain had lasted several hours  The pain began to dissipate through the evening  Patient recalls at approximately 9 p m  yesterday evening, she had felt as though the pain was a 1/10  She does not recall having this precordial chest pain in the past      Patient does endorse intermittent palpitations, both at rest and with exertion, that have been occurring for unknown duration  She felt as though the palpitations were "normal" so she did not seek evaluation or speak to her family about them  Additionally, patient states that she has intermittent lightheadedness as well  Patient is active and plays softball  She noted that she has had increasing fatigue over the past few weeks  While at softball, she typically does not seek a break, however over the past few weeks, she has needed to take breaks and has sought out breaks during practice  Upon assessment and evaluation, patient is resting in bed with her mother at the bedside  At this time, patient is chest pain free  She denies shortness of breath, dizziness, lightheadedness, syncope, pre-syncope  Reviewed the above plan with the patient and her mother, both are agreeable to proceed with the cardiac catheterization today  Interventional Cardiology to also speak with the patient to provide risks and benefits  Upon documentation review, patient with history of syncope suspect due to vasovagal syncope with her migraines  This was documented via neurology in 2020  Further, noted that patient presented to ED in November 2020 due to palpitations and chest pain  Chest pain at that time was reproducible with compression of the chest  Troponin < 0 03  Familial history of CAD with maternal and paternal grandparents with MI in early 63's  Additionally, father and aunts with DVT  Past Medical History:        Past Medical History:   Diagnosis Date    Allergies     Asthma     Migraine     History reviewed  No pertinent surgical history  Allergy:        No Known Allergies    Medications:       Prior to Admission medications    Medication Sig Start Date End Date Taking? Authorizing Provider   albuterol (ProAir HFA) 90 mcg/act inhaler Inhale 2 puffs every 6 (six) hours as needed (exercise induced asthma) 9/15/21   Samara Paulson PA-C   albuterol (PROVENTIL HFA,VENTOLIN HFA) 90 mcg/act inhaler Inhale 2 puffs every 6 (six) hours as needed for wheezing    Historical Provider, MD   amitriptyline (ELAVIL) 50 mg tablet Take 1 tablet (50 mg total) by mouth daily at bedtime  Patient not taking: Reported on 3/23/2022  7/10/19   Cathy Son MD   medroxyPROGESTERone acetate (DEPO-PROVERA SYRINGE) 150 mg/mL injection inject intramuscularly EVERY THREE MONTHS  Patient not taking: Reported on 3/23/2022 12/3/19   Historical Provider, MD MARIE VITAMIN D-3 50 MCG (2000 UT) CAPS Take 1 capsule by mouth daily  Patient not taking: Reported on 3/23/2022  10/18/19   Historical Provider, MD   topiramate (TOPAMAX) 100 mg tablet Take 100 mg by mouth 2 (two) times a day 9/13/21   Historical Provider, MD       Family History:     History reviewed  No pertinent family history  Social History:       Social History     Socioeconomic History    Marital status: Single     Spouse name: None    Number of children: None    Years of education: None    Highest education level: None   Occupational History    None   Tobacco Use    Smoking status: Never Smoker    Smokeless tobacco: Never Used   Vaping Use    Vaping Use: Never used   Substance and Sexual Activity    Alcohol use:  Yes    Drug use: No    Sexual activity: Yes   Other Topics Concern    None   Social History Narrative    None     Social Determinants of Health     Financial Resource Strain: Not on file   Food Insecurity: Not on file Transportation Needs: Not on file   Physical Activity: Not on file   Stress: Not on file   Social Connections: Not on file   Intimate Partner Violence: Not on file   Housing Stability: Not on file       ROS:  Negative except otherwise aforementioned above  Remainder review of systems is negative    Exam:  General:  alert, oriented and in no distress, cooperative  Head: Normocephalic, atraumatic  Eyes:  EOMI  Pupils - equal, round, reactive to accomodation  No icterus  Normal Conjunctiva  Oropharynx: moist and normal-appearing mucosa  Neck: supple, symmetrical, trachea midline and no JVD  Heart:  RRR, No: murmer, rub or gallop, S1 & S2 normal   Respiratory effort / Chest Inspection: unlabored  Lungs:  normal air entry, lungs clear to auscultation and no rales, rhonchi or wheezing   Abdomen: flat, normal findings: bowel sounds normal and soft, non-tender  Lower Limbs:  no pitting edema  Pulses[de-identified]  RLE - DP: present 2+                 LLE - DP: present 2+  Musculoskeletal: ROM grossly normal    DATA:      ECG:            Normal sinus rhythm with sinus arrhythmia  Nonspecific ST and T wave abnormality  When compared with ECG of 23-MAR-2022 14:35,  No significant change was found  Confirmed by Sally Vicente ([id number]) on 3/24/2022 7:32:55 AM           Telemetry: NSR upon exam           Echocardiogram completed on 3/23/22:           Left Ventricle: Left ventricular cavity size is normal  Wall thickness is normal  The left ventricular ejection fraction is 50%  Systolic function is low normal  Although no diagnostic regional wall motion abnormality was identified, this possibility cannot be completely excluded on the basis of this study  Diastolic function is normal     Right Ventricle: Right ventricular cavity size is mildly dilated  Systolic function is low normal     Tricuspid Valve: There is mild regurgitation  Weights:     Wt Readings from Last 3 Encounters:   03/24/22 72 3 kg (159 lb 4 8 oz)   03/23/22 71 2 kg (157 lb)   01/07/22 76 2 kg (168 lb) (91 %, Z= 1 33)*     * Growth percentiles are based on CDC (Girls, 2-20 Years) data    , Body mass index is 24 22 kg/m²           Lab Studies:           Results from last 7 days   Lab Units 03/23/22 2008   TRIGLYCERIDES mg/dL 15   HDL mg/dL 57     Results from last 7 days   Lab Units 03/24/22  0327 03/23/22  1201   WBC Thousand/uL 6 83 6 14   HEMOGLOBIN g/dL 13 2 14 3   HEMATOCRIT % 40 1 42 7   PLATELETS Thousands/uL 249 265   ,   Results from last 7 days   Lab Units 03/24/22  0328 03/23/22  1201   POTASSIUM mmol/L 3 7 4 0   CHLORIDE mmol/L 110* 109*   CO2 mmol/L 24 21   BUN mg/dL 22 19   CREATININE mg/dL 1 32* 1 07   CALCIUM mg/dL 8 8 9 2   ALK PHOS U/L  --  66   ALT U/L  --  18   AST U/L  --  20

## 2022-03-24 NOTE — PLAN OF CARE
Problem: PAIN - ADULT  Goal: Verbalizes/displays adequate comfort level or baseline comfort level  Description: Interventions:  - Encourage patient to monitor pain and request assistance  - Assess pain using appropriate pain scale  - Administer analgesics based on type and severity of pain and evaluate response  - Implement non-pharmacological measures as appropriate and evaluate response  - Consider cultural and social influences on pain and pain management  - Notify physician/advanced practitioner if interventions unsuccessful or patient reports new pain  Outcome: Progressing     Problem: INFECTION - ADULT  Goal: Absence or prevention of progression during hospitalization  Description: INTERVENTIONS:  - Assess and monitor for signs and symptoms of infection  - Monitor lab/diagnostic results  - Monitor all insertion sites, i e  indwelling lines, tubes, and drains  - Monitor endotracheal if appropriate and nasal secretions for changes in amount and color  - [place] appropriate cooling/warming therapies per order  - Administer medications as ordered  - Instruct and encourage patient and family to use good hand hygiene technique  - Identify and instruct in appropriate isolation precautions for identified infection/condition  Outcome: Progressing  Goal: Absence of fever/infection during neutropenic period  Description: INTERVENTIONS:  - Monitor WBC    Outcome: Progressing     Problem: SAFETY ADULT  Goal: Patient will remain free of falls  Description: INTERVENTIONS:  - Educate patient/family on patient safety including physical limitations  - Instruct patient to call for assistance with activity   - Consult OT/PT to assist with strengthening/mobility   - Keep Call bell within reach  - Keep bed low and locked with side rails adjusted as appropriate  - Keep care items and personal belongings within reach  - Initiate and maintain comfort rounds  - Make Fall Risk Sign visible to staff  - Offer Toileting every 2 Hours, in advance of need  - Initiate/Maintain bed alarm  - Obtain necessary fall risk management equipment:   - Apply yellow socks and bracelet for high fall risk patients  - Consider moving patient to room near nurses station  Outcome: Progressing  Goal: Maintain or return to baseline ADL function  Description: INTERVENTIONS:  -  Assess patient's ability to carry out ADLs; assess patient's baseline for ADL function and identify physical deficits which impact ability to perform ADLs (bathing, care of mouth/teeth, toileting, grooming, dressing, etc )  - Assess/evaluate cause of self-care deficits   - Assess range of motion  - Assess patient's mobility; develop plan if impaired  - Assess patient's need for assistive devices and provide as appropriate  - Encourage maximum independence but intervene and supervise when necessary  - Involve family in performance of ADLs  - Assess for home care needs following discharge   - Consider OT consult to assist with ADL evaluation and planning for discharge  - Provide patient education as appropriate  Outcome: Progressing  Goal: Maintains/Returns to pre admission functional level  Description: INTERVENTIONS:  - Perform BMAT or MOVE assessment daily    - Set and communicate daily mobility goal to care team and patient/family/caregiver  - Collaborate with rehabilitation services on mobility goals if consulted  - Perform Range of Motion 3 times a day  - Reposition patient every 3 hours    - Dangle patient 3 times a day  - Stand patient 3 times a day  - Ambulate patient 3 times a day  - Out of bed to chair 3 times a day   - Out of bed for meals 3 times a day  - Out of bed for toileting  - Record patient progress and toleration of activity level   Outcome: Progressing     Problem: DISCHARGE PLANNING  Goal: Discharge to home or other facility with appropriate resources  Description: INTERVENTIONS:  - Identify barriers to discharge w/patient and caregiver  - Arrange for needed discharge resources and transportation as appropriate  - Identify discharge learning needs (meds, wound care, etc )  - Arrange for interpretive services to assist at discharge as needed  - Refer to Case Management Department for coordinating discharge planning if the patient needs post-hospital services based on physician/advanced practitioner order or complex needs related to functional status, cognitive ability, or social support system  Outcome: Progressing     Problem: Knowledge Deficit  Goal: Patient/family/caregiver demonstrates understanding of disease process, treatment plan, medications, and discharge instructions  Description: Complete learning assessment and assess knowledge base    Interventions:  - Provide teaching at level of understanding  - Provide teaching via preferred learning methods  Outcome: Progressing     Problem: COPING  Goal: Pt/Family able to verbalize concerns and demonstrate effective coping strategies  Description: INTERVENTIONS:  - Assist patient/family to identify coping skills, available support systems and cultural and spiritual values  - Provide emotional support, including active listening and acknowledgement of concerns of patient and caregivers  - Reduce environmental stimuli, as able  - Provide patient education  - Assess for spiritual pain/suffering and initiate spiritual care, including notification of Pastoral Care or rc based community as needed  - Assess effectiveness of coping strategies  Outcome: Progressing  Goal: Will report anxiety at manageable levels  Description: INTERVENTIONS:  - Administer medication as ordered  - Teach and encourage coping skills  - Provide emotional support  - Assess patient/family for anxiety and ability to cope  Outcome: Progressing     Problem: CARDIOVASCULAR - ADULT  Goal: Maintains optimal cardiac output and hemodynamic stability  Description: INTERVENTIONS:  - Monitor I/O, vital signs and rhythm  - Monitor for S/S and trends of decreased cardiac output  - Administer and titrate ordered vasoactive medications to optimize hemodynamic stability  - Assess quality of pulses, skin color and temperature  - Assess for signs of decreased coronary artery perfusion  - Instruct patient to report change in severity of symptoms  Outcome: Progressing  Goal: Absence of cardiac dysrhythmias or at baseline rhythm  Description: INTERVENTIONS:  - Continuous cardiac monitoring, vital signs, obtain 12 lead EKG if ordered  - Administer antiarrhythmic and heart rate control medications as ordered  - Monitor electrolytes and administer replacement therapy as ordered  Outcome: Progressing

## 2022-03-24 NOTE — PROGRESS NOTES
[facility]  Progress Note Rowdy Medina 2000, 23 y o  female MRN: [id number]  Unit/Bed#: E4 -01 Encounter: [id number]  Primary Care Provider: Kita Carnes MD   Date and time admitted to hospital: 3/23/2022  7:06 PM    * Elevated troponin  Assessment & Plan  · Chest pain associated with elevated troponin and nonspecific ST changes  · Echocardiogram with no obvious wall motion abnormalities but with low normal EF  · Currently chest pain-free  Unclear etiology  Additional Pertinent findings:  Creatinine 1 3, calculated   · Currently on heparin drip and NPO in anticipation of cardiac catheterization      Elevated serum creatinine  Assessment & Plan  Recent CT with dye procedure  Monitor renal function closely  Repeat labs in a m  Tobin Aris Continue IV fluid hydration, increase to 125 mL/hour  Avoid hypotension  Avoid nephrotoxic agent    Asthma  Assessment & Plan  · History of Exercise induced asthma without exacerbation  · Active and plays softball  · Continue p r n  Albuterol    Migraine without aura and without status migrainosus, not intractable  Assessment & Plan  · Stable  · Continue Topamax      VTE Pharmacologic Prophylaxis:   Pharmacologic: Heparin Drip  Mechanical VTE Prophylaxis in Place: Yes    Patient Centered Rounds: I have performed bedside rounds with nursing staff today  Discussions with Specialists or Other Care Team Provider:  H&P reviewed  Communicated with Cardiology via [facility] connect    Education and Discussions with Family / Patient:  Spoke with Mom and gave update    Time Spent for Care: 25 minutes  More than 50% of total time spent on counseling and coordination of care as described above  Current Length of Stay: 1 day(s)    Current Patient Status: Inpatient   Certification Statement: The patient will continue to require additional inpatient hospital stay due to NSTEMI    Discharge Plan:   To be determined    Code Status: Level 1 - Full Code      Subjective:   Seen and examined earlier during rounds with nurse Sourav Xavier  Chest pain-free  Denied recent upper respiratory illness  She was having a normal week until yesterday  She recalled playing softball this week and dove to a base plate incorrectly  That was the only thing unusual she noticed this week  She denied other trauma to the chest     Objective:     Vitals:   Temp (24hrs), Av °F (36 1 °C), Min:96 8 °F (36 °C), Max:97 2 °F (36 2 °C)    Temp:  [96 8 °F (36 °C)-97 2 °F (36 2 °C)] 96 8 °F (36 °C)  HR:  [54-79] 54  Resp:  [18-20] 18  BP: ()/(52-88) 94/52  SpO2:  [92 %-100 %] 100 %  Body mass index is 24 22 kg/m²  Input and Output Summary (last 24 hours): Intake/Output Summary (Last 24 hours) at 3/24/2022 0846  Last data filed at 3/24/2022 0542  Gross per 24 hour   Intake --   Output 650 ml   Net -650 ml       Physical Exam:     Physical Exam  Constitutional:       Appearance: She is not ill-appearing or diaphoretic  HENT:      Head: Normocephalic and atraumatic  Nose: No rhinorrhea  Eyes:      General: No scleral icterus  Cardiovascular:      Rate and Rhythm: Regular rhythm  Heart sounds: No murmur heard  No gallop  Pulmonary:      Effort: Pulmonary effort is normal  No respiratory distress  Breath sounds: No wheezing  Abdominal:      General: Abdomen is flat  There is no distension  Palpations: Abdomen is soft  Musculoskeletal:      Cervical back: Neck supple  Right lower leg: No edema  Left lower leg: No edema  Skin:     General: Skin is warm and dry  Neurological:      Mental Status: She is alert and oriented to person, place, and time     Psychiatric:         Mood and Affect: Mood normal          Behavior: Behavior normal      Additional Data:     Labs:    Results from last 7 days   Lab Units 22  0327   WBC Thousand/uL 6 83   HEMOGLOBIN g/dL 13 2   HEMATOCRIT % 40 1   PLATELETS Thousands/uL 249   NEUTROS PCT % 43 LYMPHS PCT % 44   MONOS PCT % 9   EOS PCT % 3     Results from last 7 days   Lab Units 03/24/22  0328 03/23/22  1201 03/23/22  1201   SODIUM mmol/L 143   < > 139   POTASSIUM mmol/L 3 7   < > 4 0   CHLORIDE mmol/L 110*   < > 109*   CO2 mmol/L 24   < > 21   BUN mg/dL 22   < > 19   CREATININE mg/dL 1 32*   < > 1 07   ANION GAP mmol/L 9   < > 9   CALCIUM mg/dL 8 8   < > 9 2   ALBUMIN g/dL  --   --  4 4   TOTAL BILIRUBIN mg/dL  --   --  0 41   ALK PHOS U/L  --   --  66   ALT U/L  --   --  18   AST U/L  --   --  20   GLUCOSE RANDOM mg/dL 88   < > 85    < > = values in this interval not displayed  Results from last 7 days   Lab Units 03/23/22  1434   INR  1 08         Results from last 7 days   Lab Units 03/23/22 2008   HEMOGLOBIN A1C % 5 0               * I Have Reviewed All Lab Data Listed Above  * Additional Pertinent Lab Tests Reviewed:  All Labs Within Last 24 Hours Reviewed    Imaging:    Imaging Reports Reviewed Today Include:  CT chest, chest x-ray, echo  Imaging Personally Reviewed by Myself Includes:  Telemetry with no malignant arrhythmia    Recent Cultures (last 7 days):           Last 24 Hours Medication List:   Current Facility-Administered Medications   Medication Dose Route Frequency Provider Last Rate    acetaminophen  650 mg Oral Q4H PRN Susen Anchors, PA-C      albuterol  2 puff Inhalation Q6H PRN Susen Anchors, PA-C      calcium carbonate  1,000 mg Oral Daily PRN Susen Anchors, PA-C      heparin (porcine)  3-20 Units/kg/hr (Order-Specific) Intravenous Titrated Susen Anchors, PA-C 13 Units/kg/hr (03/24/22 0440)    heparin (porcine)  2,000 Units Intravenous Q1H PRN Susen Anchors, PA-C      heparin (porcine)  4,000 Units Intravenous Q1H PRN Susen Anchors, PA-C      melatonin  6 mg Oral HS Susen Anchors, PA-C      nitroglycerin  0 4 mg Sublingual Q5 Min PRN Susen Anchors, PA-C      ondansetron  4 mg Intravenous Q6H PRN Susen Anchors, PA-C      sodium chloride  125 mL/hr Intravenous Continuous Timbo Padilla MD      topiramate  150 mg Oral BID Eduard London PA-C          Today, Patient Was Seen By: Timbo Padilla MD    ** Please Note: Dictation voice to text software may have been used in the creation of this document   **

## 2022-03-24 NOTE — ASSESSMENT & PLAN NOTE
· History of Exercise induced asthma without exacerbation  · Active and plays softball  · Continue p r n   Albuterol

## 2022-03-24 NOTE — PLAN OF CARE
Problem: PAIN - ADULT  Goal: Verbalizes/displays adequate comfort level or baseline comfort level  Description: Interventions:  - Encourage patient to monitor pain and request assistance  - Assess pain using appropriate pain scale  - Administer analgesics based on type and severity of pain and evaluate response  - Implement non-pharmacological measures as appropriate and evaluate response  - Consider cultural and social influences on pain and pain management  - Notify physician/advanced practitioner if interventions unsuccessful or patient reports new pain  Outcome: Progressing     Problem: INFECTION - ADULT  Goal: Absence or prevention of progression during hospitalization  Description: INTERVENTIONS:  - Assess and monitor for signs and symptoms of infection  - Monitor lab/diagnostic results  - Monitor all insertion sites, i e  indwelling lines, tubes, and drains  - Monitor endotracheal if appropriate and nasal secretions for changes in amount and color  - [place] appropriate cooling/warming therapies per order  - Administer medications as ordered  - Instruct and encourage patient and family to use good hand hygiene technique  - Identify and instruct in appropriate isolation precautions for identified infection/condition  Outcome: Progressing  Goal: Absence of fever/infection during neutropenic period  Description: INTERVENTIONS:  - Monitor WBC    Outcome: Progressing     Problem: SAFETY ADULT  Goal: Patient will remain free of falls  Description: INTERVENTIONS:  - Educate patient/family on patient safety including physical limitations  - Instruct patient to call for assistance with activity   - Consult OT/PT to assist with strengthening/mobility   - Keep Call bell within reach  - Keep bed low and locked with side rails adjusted as appropriate  - Keep care items and personal belongings within reach  - Initiate and maintain comfort rounds    Outcome: Progressing  Goal: Maintain or return to baseline ADL function  Description: INTERVENTIONS:  -  Assess patient's ability to carry out ADLs; assess patient's baseline for ADL function and identify physical deficits which impact ability to perform ADLs (bathing, care of mouth/teeth, toileting, grooming, dressing, etc )  - Assess/evaluate cause of self-care deficits   - Assess range of motion  - Assess patient's mobility; develop plan if impaired  - Assess patient's need for assistive devices and provide as appropriate  - Encourage maximum independence but intervene and supervise when necessary  - Involve family in performance of ADLs  - Assess for home care needs following discharge   - Consider OT consult to assist with ADL evaluation and planning for discharge  - Provide patient education as appropriate  Outcome: Progressing  Goal: Maintains/Returns to pre admission functional level  Description: INTERVENTIONS:  - Perform BMAT or MOVE assessment daily    - Set and communicate daily mobility goal to care team and patient/family/caregiver  - Collaborate with rehabilitation services on mobility goals if consulted  - Perform Range of Motion 3 times a day  - Reposition patient every 2 hours    - Dangle patient 3 times a day  - Stand patient 3 times a day  - Ambulate patient 3 times a day  - Out of bed to chair 3 times a day   - Out of bed for meals 3 times a day  - Out of bed for toileting  - Record patient progress and toleration of activity level   Outcome: Progressing     Problem: DISCHARGE PLANNING  Goal: Discharge to home or other facility with appropriate resources  Description: INTERVENTIONS:  - Identify barriers to discharge w/patient and caregiver  - Arrange for needed discharge resources and transportation as appropriate  - Identify discharge learning needs (meds, wound care, etc )  - Arrange for interpretive services to assist at discharge as needed  - Refer to Case Management Department for coordinating discharge planning if the patient needs post-hospital services based on physician/advanced practitioner order or complex needs related to functional status, cognitive ability, or social support system  Outcome: Progressing     Problem: Knowledge Deficit  Goal: Patient/family/caregiver demonstrates understanding of disease process, treatment plan, medications, and discharge instructions  Description: Complete learning assessment and assess knowledge base    Interventions:  - Provide teaching at level of understanding  - Provide teaching via preferred learning methods  Outcome: Progressing     Problem: COPING  Goal: Pt/Family able to verbalize concerns and demonstrate effective coping strategies  Description: INTERVENTIONS:  - Assist patient/family to identify coping skills, available support systems and cultural and spiritual values  - Provide emotional support, including active listening and acknowledgement of concerns of patient and caregivers  - Reduce environmental stimuli, as able  - Provide patient education  - Assess for spiritual pain/suffering and initiate spiritual care, including notification of Pastoral Care or rc based community as needed  - Assess effectiveness of coping strategies  Outcome: Progressing  Goal: Will report anxiety at manageable levels  Description: INTERVENTIONS:  - Administer medication as ordered  - Teach and encourage coping skills  - Provide emotional support  - Assess patient/family for anxiety and ability to cope  Outcome: Progressing     Problem: CARDIOVASCULAR - ADULT  Goal: Maintains optimal cardiac output and hemodynamic stability  Description: INTERVENTIONS:  - Monitor I/O, vital signs and rhythm  - Monitor for S/S and trends of decreased cardiac output  - Administer and titrate ordered vasoactive medications to optimize hemodynamic stability  - Assess quality of pulses, skin color and temperature  - Assess for signs of decreased coronary artery perfusion  - Instruct patient to report change in severity of symptoms  Outcome: Progressing  Goal: Absence of cardiac dysrhythmias or at baseline rhythm  Description: INTERVENTIONS:  - Continuous cardiac monitoring, vital signs, obtain 12 lead EKG if ordered  - Administer antiarrhythmic and heart rate control medications as ordered  - Monitor electrolytes and administer replacement therapy as ordered  Outcome: Progressing

## 2022-03-24 NOTE — ASSESSMENT & PLAN NOTE
· Chest pain associated with elevated troponin and nonspecific ST changes  · Echocardiogram with no obvious wall motion abnormalities but with low normal EF  · Currently chest pain-free  Unclear etiology      Additional Pertinent findings:  Creatinine 1 3, calculated   · Currently on heparin drip and NPO in anticipation of cardiac catheterization

## 2022-03-25 ENCOUNTER — APPOINTMENT (INPATIENT)
Dept: NON INVASIVE DIAGNOSTICS | Facility: HOSPITAL | Age: 22
DRG: 287 | End: 2022-03-25
Payer: COMMERCIAL

## 2022-03-25 VITALS
SYSTOLIC BLOOD PRESSURE: 98 MMHG | TEMPERATURE: 97.4 F | OXYGEN SATURATION: 98 % | HEIGHT: 68 IN | WEIGHT: 159 LBS | BODY MASS INDEX: 24.1 KG/M2 | DIASTOLIC BLOOD PRESSURE: 54 MMHG | RESPIRATION RATE: 18 BRPM | HEART RATE: 62 BPM

## 2022-03-25 DIAGNOSIS — I31.9 MYOPERICARDITIS: Primary | ICD-10-CM

## 2022-03-25 PROBLEM — R79.89 ELEVATED SERUM CREATININE: Status: RESOLVED | Noted: 2022-03-24 | Resolved: 2022-03-25

## 2022-03-25 LAB
ANION GAP SERPL CALCULATED.3IONS-SCNC: 12 MMOL/L (ref 4–13)
APICAL FOUR CHAMBER EJECTION FRACTION: 59 %
BASOPHILS # BLD AUTO: 0.05 THOUSANDS/ΜL (ref 0–0.1)
BASOPHILS NFR BLD AUTO: 1 % (ref 0–1)
BUN SERPL-MCNC: 13 MG/DL (ref 5–25)
CALCIUM SERPL-MCNC: 8.7 MG/DL (ref 8.3–10.1)
CHLORIDE SERPL-SCNC: 110 MMOL/L (ref 100–108)
CO2 SERPL-SCNC: 21 MMOL/L (ref 21–32)
CREAT SERPL-MCNC: 1 MG/DL (ref 0.6–1.3)
EOSINOPHIL # BLD AUTO: 0.14 THOUSAND/ΜL (ref 0–0.61)
EOSINOPHIL NFR BLD AUTO: 2 % (ref 0–6)
ERYTHROCYTE [DISTWIDTH] IN BLOOD BY AUTOMATED COUNT: 12.6 % (ref 11.6–15.1)
GFR SERPL CREATININE-BSD FRML MDRD: 81 ML/MIN/1.73SQ M
GLUCOSE SERPL-MCNC: 90 MG/DL (ref 65–140)
HCT VFR BLD AUTO: 37.2 % (ref 34.8–46.1)
HGB BLD-MCNC: 12.8 G/DL (ref 11.5–15.4)
HIV 1+2 AB+HIV1 P24 AG SERPL QL IA: NORMAL
HIV1 P24 AG SER QL: NORMAL
IMM GRANULOCYTES # BLD AUTO: 0.02 THOUSAND/UL (ref 0–0.2)
IMM GRANULOCYTES NFR BLD AUTO: 0 % (ref 0–2)
LYMPHOCYTES # BLD AUTO: 2.65 THOUSANDS/ΜL (ref 0.6–4.47)
LYMPHOCYTES NFR BLD AUTO: 40 % (ref 14–44)
MCH RBC QN AUTO: 30.5 PG (ref 26.8–34.3)
MCHC RBC AUTO-ENTMCNC: 34.4 G/DL (ref 31.4–37.4)
MCV RBC AUTO: 89 FL (ref 82–98)
MONOCYTES # BLD AUTO: 0.6 THOUSAND/ΜL (ref 0.17–1.22)
MONOCYTES NFR BLD AUTO: 9 % (ref 4–12)
NEUTROPHILS # BLD AUTO: 3.22 THOUSANDS/ΜL (ref 1.85–7.62)
NEUTS SEG NFR BLD AUTO: 48 % (ref 43–75)
NRBC BLD AUTO-RTO: 0 /100 WBCS
PLATELET # BLD AUTO: 232 THOUSANDS/UL (ref 149–390)
PMV BLD AUTO: 10.5 FL (ref 8.9–12.7)
POTASSIUM SERPL-SCNC: 4 MMOL/L (ref 3.5–5.3)
RBC # BLD AUTO: 4.19 MILLION/UL (ref 3.81–5.12)
SL CV LV EF: 55
SODIUM SERPL-SCNC: 143 MMOL/L (ref 136–145)
TR MAX PG: 15 MMHG
TR PEAK VELOCITY: 1.9 M/S
TRICUSPID VALVE PEAK REGURGITATION VELOCITY: 1.94 M/S
WBC # BLD AUTO: 6.68 THOUSAND/UL (ref 4.31–10.16)

## 2022-03-25 PROCEDURE — 93308 TTE F-UP OR LMTD: CPT | Performed by: INTERNAL MEDICINE

## 2022-03-25 PROCEDURE — 90471 IMMUNIZATION ADMIN: CPT | Performed by: INTERNAL MEDICINE

## 2022-03-25 PROCEDURE — 93321 DOPPLER ECHO F-UP/LMTD STD: CPT

## 2022-03-25 PROCEDURE — 87806 HIV AG W/HIV1&2 ANTB W/OPTIC: CPT | Performed by: INTERNAL MEDICINE

## 2022-03-25 PROCEDURE — 80048 BASIC METABOLIC PNL TOTAL CA: CPT | Performed by: INTERNAL MEDICINE

## 2022-03-25 PROCEDURE — 93325 DOPPLER ECHO COLOR FLOW MAPG: CPT | Performed by: INTERNAL MEDICINE

## 2022-03-25 PROCEDURE — 85025 COMPLETE CBC W/AUTO DIFF WBC: CPT | Performed by: INTERNAL MEDICINE

## 2022-03-25 PROCEDURE — 93325 DOPPLER ECHO COLOR FLOW MAPG: CPT

## 2022-03-25 PROCEDURE — 93308 TTE F-UP OR LMTD: CPT

## 2022-03-25 PROCEDURE — 93321 DOPPLER ECHO F-UP/LMTD STD: CPT | Performed by: INTERNAL MEDICINE

## 2022-03-25 PROCEDURE — 99232 SBSQ HOSP IP/OBS MODERATE 35: CPT | Performed by: INTERNAL MEDICINE

## 2022-03-25 PROCEDURE — 90686 IIV4 VACC NO PRSV 0.5 ML IM: CPT | Performed by: INTERNAL MEDICINE

## 2022-03-25 PROCEDURE — 99239 HOSP IP/OBS DSCHRG MGMT >30: CPT | Performed by: INTERNAL MEDICINE

## 2022-03-25 RX ORDER — COLCHICINE 0.6 MG/1
0.6 TABLET ORAL 2 TIMES DAILY
Qty: 60 TABLET | Refills: 2 | Status: ON HOLD | OUTPATIENT
Start: 2022-03-25 | End: 2022-04-29 | Stop reason: SDUPTHER

## 2022-03-25 RX ADMIN — TOPIRAMATE 150 MG: 100 TABLET, FILM COATED ORAL at 08:50

## 2022-03-25 RX ADMIN — COLCHICINE 0.6 MG: 0.6 TABLET, FILM COATED ORAL at 08:50

## 2022-03-25 RX ADMIN — INFLUENZA VIRUS VACCINE 0.5 ML: 15; 15; 15; 15 SUSPENSION INTRAMUSCULAR at 06:02

## 2022-03-25 NOTE — PLAN OF CARE
Problem: PAIN - ADULT  Goal: Verbalizes/displays adequate comfort level or baseline comfort level  Description: Interventions:  - Encourage patient to monitor pain and request assistance  - Assess pain using appropriate pain scale  - Administer analgesics based on type and severity of pain and evaluate response  - Implement non-pharmacological measures as appropriate and evaluate response  - Consider cultural and social influences on pain and pain management  - Notify physician/advanced practitioner if interventions unsuccessful or patient reports new pain  Outcome: Progressing     Problem: INFECTION - ADULT  Goal: Absence or prevention of progression during hospitalization  Description: INTERVENTIONS:  - Assess and monitor for signs and symptoms of infection  - Monitor lab/diagnostic results  - Monitor all insertion sites, i e  indwelling lines, tubes, and drains  - Monitor endotracheal if appropriate and nasal secretions for changes in amount and color  - [place] appropriate cooling/warming therapies per order  - Administer medications as ordered  - Instruct and encourage patient and family to use good hand hygiene technique  - Identify and instruct in appropriate isolation precautions for identified infection/condition  Outcome: Progressing  Goal: Absence of fever/infection during neutropenic period  Description: INTERVENTIONS:  - Monitor WBC    Outcome: Progressing     Problem: SAFETY ADULT  Goal: Patient will remain free of falls  Description: INTERVENTIONS:  - Educate patient/family on patient safety including physical limitations  - Instruct patient to call for assistance with activity   - Consult OT/PT to assist with strengthening/mobility   - Keep Call bell within reach  - Keep bed low and locked with side rails adjusted as appropriate  - Keep care items and personal belongings within reach  - Initiate and maintain comfort rounds  - Make Fall Risk Sign visible to staff  - Apply yellow socks and bracelet for high fall risk patients  - Consider moving patient to room near nurses station  Outcome: Progressing  Goal: Maintain or return to baseline ADL function  Description: INTERVENTIONS:  -  Assess patient's ability to carry out ADLs; assess patient's baseline for ADL function and identify physical deficits which impact ability to perform ADLs (bathing, care of mouth/teeth, toileting, grooming, dressing, etc )  - Assess/evaluate cause of self-care deficits   - Assess range of motion  - Assess patient's mobility; develop plan if impaired  - Assess patient's need for assistive devices and provide as appropriate  - Encourage maximum independence but intervene and supervise when necessary  - Involve family in performance of ADLs  - Assess for home care needs following discharge   - Consider OT consult to assist with ADL evaluation and planning for discharge  - Provide patient education as appropriate  Outcome: Progressing  Goal: Maintains/Returns to pre admission functional level  Description: INTERVENTIONS:  - Perform BMAT or MOVE assessment daily    - Set and communicate daily mobility goal to care team and patient/family/caregiver  - Collaborate with rehabilitation services on mobility goals if consulted  - Perform Range of Motion 3 times a day  - Reposition patient every 2 hours    - Dangle patient 3 times a day  - Stand patient 3 times a day  - Ambulate patient 3 times a day  - Out of bed to chair 3 times a day   - Out of bed for meals 3 times a day  - Out of bed for toileting  - Record patient progress and toleration of activity level   Outcome: Progressing     Problem: DISCHARGE PLANNING  Goal: Discharge to home or other facility with appropriate resources  Description: INTERVENTIONS:  - Identify barriers to discharge w/patient and caregiver  - Arrange for needed discharge resources and transportation as appropriate  - Identify discharge learning needs (meds, wound care, etc )  - Arrange for interpretive services to assist at discharge as needed  - Refer to Case Management Department for coordinating discharge planning if the patient needs post-hospital services based on physician/advanced practitioner order or complex needs related to functional status, cognitive ability, or social support system  Outcome: Progressing     Problem: Knowledge Deficit  Goal: Patient/family/caregiver demonstrates understanding of disease process, treatment plan, medications, and discharge instructions  Description: Complete learning assessment and assess knowledge base    Interventions:  - Provide teaching at level of understanding  - Provide teaching via preferred learning methods  Outcome: Progressing     Problem: COPING  Goal: Pt/Family able to verbalize concerns and demonstrate effective coping strategies  Description: INTERVENTIONS:  - Assist patient/family to identify coping skills, available support systems and cultural and spiritual values  - Provide emotional support, including active listening and acknowledgement of concerns of patient and caregivers  - Reduce environmental stimuli, as able  - Provide patient education  - Assess for spiritual pain/suffering and initiate spiritual care, including notification of Pastoral Care or rc based community as needed  - Assess effectiveness of coping strategies  Outcome: Progressing  Goal: Will report anxiety at manageable levels  Description: INTERVENTIONS:  - Administer medication as ordered  - Teach and encourage coping skills  - Provide emotional support  - Assess patient/family for anxiety and ability to cope  Outcome: Progressing     Problem: CARDIOVASCULAR - ADULT  Goal: Maintains optimal cardiac output and hemodynamic stability  Description: INTERVENTIONS:  - Monitor I/O, vital signs and rhythm  - Monitor for S/S and trends of decreased cardiac output  - Administer and titrate ordered vasoactive medications to optimize hemodynamic stability  - Assess quality of pulses, skin color and temperature  - Assess for signs of decreased coronary artery perfusion  - Instruct patient to report change in severity of symptoms  Outcome: Progressing  Goal: Absence of cardiac dysrhythmias or at baseline rhythm  Description: INTERVENTIONS:  - Continuous cardiac monitoring, vital signs, obtain 12 lead EKG if ordered  - Administer antiarrhythmic and heart rate control medications as ordered  - Monitor electrolytes and administer replacement therapy as ordered  Outcome: Progressing

## 2022-03-25 NOTE — DISCHARGE INSTR - AVS FIRST PAGE
You were diagnosed with chest pain and abnormal heart testing due to a condition called myopericarditis which is inflammation of the heart muscle and the fibrous tissue covering the heart  You were started on a medication called colchicine to decreased inflammation  Take this twice a day for 3 months    Follow-up with the heart doctor as scheduled on April 8       AVOID HIGH-INTENSITY SPORTS/ACTIVITIES for the next 3 months

## 2022-03-25 NOTE — ED ATTENDING ATTESTATION
3/23/2022  Reno Sargent DO, saw and evaluated the patient  I have discussed the patient with the resident/non-physician practitioner and agree with the resident's/non-physician practitioner's findings, Plan of Care, and MDM as documented in the resident's/non-physician practitioner's note, except where noted  All available labs and Radiology studies were reviewed  I was present for key portions of any procedure(s) performed by the resident/non-physician practitioner and I was immediately available to provide assistance  At this point I agree with the current assessment done in the Emergency Department  I have conducted an independent evaluation of this patient a history and physical is as follows:      Patient is a 47-year-old female with no medical history presenting for evaluation of right-sided chest pain  Patient says the symptoms started upon standing up from a chair  She described the pain as sharp in nature  She said it did not radiate anywhere else  She said that she had some mild associated shortness of breath and mild lightheadedness  She denied a nausea, vomiting, diaphoresis  Patient says that she had been feeling more fatigued over the past week especially with doing physical activity  She said that she did not have any chest pain over that time  Patient's vitals were within normal limits on arrival   Basic cardiac workup was obtained  Initial troponin 31, EKG shows some mild T-wave inversions in the inferior leads  A D-dimer was added given troponin and EKG changes    Repeat troponin came back at 1000  Patient denies any worsening chest pain at that time  Repeat EKG unchanged  D-dimer was elevated, however CT PE study did not show acute PE  I reviewed the imaging with the radiologist who confirmed  Cardiology was consulted, who said the start the patient on heparin  Patient was transferred for ischemic workup      ED Course  ED Course as of 03/25/22 0036   Wed Mar 23, 2022 1525 Went and evaluated the patient  Her vitals are within normal limits  She says that her pain has greatly improved and rates it as a 2/10 now (if it was a 10/10 when she 1st had it)  Explained to mom that we are going to get a CT PE study given elevated D-dimer    Explained to her that she will need to be admitted to the hospital and likely transferred to another facility         Critical Care Time  Procedures

## 2022-03-25 NOTE — ASSESSMENT & PLAN NOTE
· Probably related to recent COVID infection/viral  · Medically stable  · Repeat echo pending  · Further management/follow-up per Cardiology  · Tolerating colchicine

## 2022-03-25 NOTE — DISCHARGE SUMMARY
[address] [facility]  Discharge- Starla Agosto 2000, 23 y o  female MRN: [id number]  Unit/Bed#: E4 -01 Encounter: [id number]  Primary Care Provider: Sharad Rogers MD   Date and time admitted to hospital: 3/23/2022  7:06 PM    * Elevated troponin  Assessment & Plan  · Chest pain associated with elevated troponin and nonspecific ST changes  · Secondary to myopericarditis not due to NSTEMI  · Cardiac catheterization normal  · Chest pain resolved    Myopericarditis  Assessment & Plan  · Possibly related to recent COVID infection/viral  · Medically stable  · Continue colchicine b i d  For 3 months  · Outpatient follow-up with cardiology as scheduled on April 8  · Patient was advised by Cardiology not to undergo strenuous activities for 3 months    Elevated serum creatinine-resolved as of 3/25/2022  Assessment & Plan  Transient, related to acute illness/contrast exposure  Resolved with IV fluid    Asthma  Assessment & Plan  · History of Exercise induced asthma without exacerbation  · Active and plays softball  · Continue p r n   Albuterol    Migraine without aura and without status migrainosus, not intractable  Assessment & Plan  · Stable  · Continue Topamax      Discharging Physician / Practitioner: Tab Owens MD  PCP: Sharad Rogers MD  Admission Date:   Admission Orders (From admission, onward)     Ordered        03/23/22 1940  Inpatient Admission  Once                      Discharge Date: 03/25/22    Medical Problems             Resolved Problems  Date Reviewed: 3/25/2022          Resolved    Elevated serum creatinine 3/25/2022     Resolved by  Tab Owens MD                Consultations During Hospital Stay:  · Cardiology    Procedures Performed:   · Cardiac catheterization    Significant Findings / Test Results:   · Cardiac catheterization-normal  · Troponin 3035, 3866  · Echo-EF 55%    Incidental Findings:   · Left-to-right shunting by color Doppler consistent with small moderate-sized PFO     Test Results Pending at Discharge (will require follow up): · None     Outpatient Tests Requested:  · None    Complications:  None    Reason for Admission:  Chest pain    Hospital Course:     Zoya Foote is a 23 y o  female patient who originally presented to the hospital on 3/23/2022 due to chest pain associated with nonspecific ST changes and significantly elevated troponin  She was initially seen at [address] and was sent to [address] [address] for higher level of care  She underwent a catheterization was short normal coronary arteries  She was evaluated by Cardiology who felt that her chest pain, elevated troponin and abnormal EKG was due to myopericarditis  Of note the patient had COVID in January of this year  She was started on colchicine 0 6 mg b i d  Which she tolerated pain she will continue this medication for 3 months  She will have a follow-up with cardiology on April 8, 2022  An outpatient MRI was also ordered  Please see above list of diagnoses and related plan for additional information  Condition at Discharge: good     Discharge Day Visit / Exam:     * Please refer to separate progress note for these details *    Discussion with Family:  Spoke with mother earlier today    Discharge instructions/Information to patient and family:   See after visit summary for information provided to patient and family  Provisions for Follow-Up Care:  See after visit summary for information related to follow-up care and any pertinent home health orders  Disposition:     Home    Planned Readmission: no     Discharge Statement:  I spent >30 minutes discharging the patient  This time was spent on the day of discharge  I had direct contact with the patient on the day of discharge   Greater than 50% of the total time was spent examining patient, answering all patient questions, arranging and discussing plan of care with patient as well as directly providing post-discharge instructions  Additional time then spent on discharge activities  Discharge Medications:  See after visit summary for reconciled discharge medications provided to patient and family        ** Please Note: This note has been constructed using a voice recognition system **

## 2022-03-25 NOTE — ASSESSMENT & PLAN NOTE
· Chest pain associated with elevated troponin and nonspecific ST changes    · Secondary to myopericarditis not due to NSTEMI  · Cardiac catheterization normal  · Chest pain resolved

## 2022-03-25 NOTE — PROGRESS NOTES
Cardiology Progress Note   MD Omid Brannon MD Otto Fore, DO, MD He Perez DO, Bro Alex DO, [facility]  ----------------------------------------------------------------  [address] [place] [address]    Tammy Casiano 23 y o  female MRN: [id number]  Unit/Bed#: E4 -01 Encounter: [id number]      ASSESSMENT:   · Myopericarditis  · Normal coronary arteries by cardiac catheterization, March 2022  · Dyspnea  · Palpitations  · LVEF 63%, normal diastolic function, top-normal RV size, small to moderate PFO, trace MR/TR w/ PASP 18 mmHg, March 2022  · Asthma  · Migraines  · Anxiety  · History of COVID 19 infection, January 2022  · History of vasovagal syncope  · Family history of CAD    PLAN:  Patient remains asymptomatic and feeling back to her baseline  Symptoms consistent with pain that is sharp in nature and worse with deep inspiration and lying back, improved with sitting  Based on her clinical presentation, I believe she has myopericarditis  Would treat with 3 months of colchicine  CRP and sed rate are unremarkable  Repeat limited 2D echocardiogram demonstrates mild improvement in LV function and incidental finding of a PFO  I have recommended to avoid high-intensity activity/sports for the following 3 months  Cardiac MRI is advised and ordered  Outpatient follow-up in the office for additional CV testing as clinically indicated    Signed: Geovanni Trujillo DO, [facility], FACP      History of Present Illness:  Patient seen and examined  Denies chest pain, pressure, tightness or squeezing  Denies lightheadedness, dizziness or palpitations  Denies lower extremity swelling, orthopnea or paroxysmal nocturnal dyspnea  Review of Systems:  Review of Systems   Constitutional: Negative for decreased appetite, fever, weight gain and weight loss  HENT: Negative for congestion and sore throat      Eyes: Negative for visual disturbance  Cardiovascular: Negative for chest pain, dyspnea on exertion, leg swelling, near-syncope and palpitations  Respiratory: Negative for cough and shortness of breath  Hematologic/Lymphatic: Negative for bleeding problem  Skin: Negative for rash  Musculoskeletal: Negative for myalgias and neck pain  Gastrointestinal: Negative for abdominal pain and nausea  Neurological: Negative for light-headedness and weakness  Psychiatric/Behavioral: Negative for depression  No Known Allergies    No current facility-administered medications on file prior to encounter       Current Outpatient Medications on File Prior to Encounter   Medication Sig    albuterol (ProAir HFA) 90 mcg/act inhaler Inhale 2 puffs every 6 (six) hours as needed (exercise induced asthma)    albuterol (PROVENTIL HFA,VENTOLIN HFA) 90 mcg/act inhaler Inhale 2 puffs every 6 (six) hours as needed for wheezing    amitriptyline (ELAVIL) 50 mg tablet Take 1 tablet (50 mg total) by mouth daily at bedtime (Patient not taking: Reported on 3/23/2022 )    medroxyPROGESTERone acetate (DEPO-PROVERA SYRINGE) 150 mg/mL injection inject intramuscularly EVERY THREE MONTHS (Patient not taking: Reported on 3/23/2022)    RA VITAMIN D-3 50 MCG (2000 UT) CAPS Take 1 capsule by mouth daily (Patient not taking: Reported on 3/23/2022 )    topiramate (TOPAMAX) 100 mg tablet Take 100 mg by mouth 2 (two) times a day        Current Facility-Administered Medications   Medication Dose Route Frequency Provider Last Rate    acetaminophen  650 mg Oral Q4H PRN Melburn Za, PA-C      albuterol  2 puff Inhalation Q6H PRN Melburn Za, PA-C      calcium carbonate  1,000 mg Oral Daily PRN Melburn Za, PA-C      colchicine  0 6 mg Oral BID Katalina Suncook, DO      melatonin  6 mg Oral HS Melburn Za, PA-C      nitroglycerin  0 4 mg Sublingual Q5 Min PRN Melburn Za, PA-C      ondansetron  4 mg Intravenous Q6H PRN Patricia Ramsey Adam Manuel PA-C      sodium chloride  125 mL/hr Intravenous Continuous Nate Rodriguez  mL/hr (03/24/22 0929)    topiramate  150 mg Oral BID Sandi Dewitt PA-C         sodium chloride, 125 mL/hr, Last Rate: 125 mL/hr (03/24/22 0929)        Vitals:    03/24/22 2245 03/25/22 0308 03/25/22 0731 03/25/22 0949   BP: 95/62 93/50 98/54 98/54   BP Location: Left arm Left arm Left arm    Pulse: 69 (!) 47 62 62   Resp: 18  18    Temp: (!) 97 2 °F (36 2 °C)  (!) 97 4 °F (36 3 °C)    TempSrc: Temporal  Temporal    SpO2: 99%  98%    Weight:    72 1 kg (159 lb)   Height:    5' 8" (1 727 m)     Body mass index is 24 18 kg/m²  Intake/Output Summary (Last 24 hours) at 3/25/2022 1336  Last data filed at 3/25/2022 1301  Gross per 24 hour   Intake 480 ml   Output --   Net 480 ml       Weight change:     PHYSICAL EXAMINATION:  Gen: Awake, Alert, NAD  Head/eyes: AT/NC, pupils equal and round, Anicteric  ENT: mmm  Neck: Supple, No elevated JVP, trachea midline  Resp: CTA bilaterally no w/r/r  CV: RRR +S1, S2, No m/r/g  Abd: Soft, NT/ND + BS  Ext: no LE edema bilaterally  Neuro: Follows commands, moves all extermities  Psych: Appropriate affect, happy mood, pleasant attitude, non-combative  Skin: warm; no rash, erythema or venous stasis changes on exposed skin    Lab Results:  Results from last 7 days   Lab Units 03/25/22  0536   WBC Thousand/uL 6 68   HEMOGLOBIN g/dL 12 8   HEMATOCRIT % 37 2   PLATELETS Thousands/uL 232     Results from last 7 days   Lab Units 03/25/22  0536 03/24/22  1057 03/24/22  1057   POTASSIUM mmol/L 4 0   < > 4 3   CHLORIDE mmol/L 110*   < > 111*   CO2 mmol/L 21   < > 21   BUN mg/dL 13   < > 19   CREATININE mg/dL 1 00   < > 0 98   CALCIUM mg/dL 8 7   < > 8 4   ALK PHOS U/L  --   --  69   ALT U/L  --   --  21   AST U/L  --   --  31    < > = values in this interval not displayed       No results found for: TROPONINT          Results from last 7 days   Lab Units 03/23/22  1434   INR  1 08 Tele: SB to SR    This note was completed in part utilizing M-Modal Fluency Direct Software  Grammatical errors, random word insertions, spelling mistakes, and incomplete sentences may be an occasional consequence of this system secondary to software limitations, ambient noise, and hardware issues  If you have any questions or concerns about the content, text, or information contained within the body of this dictation, please contact the provider for clarification

## 2022-03-25 NOTE — PLAN OF CARE
Problem: PAIN - ADULT  Goal: Verbalizes/displays adequate comfort level or baseline comfort level  Description: Interventions:  - Encourage patient to monitor pain and request assistance  - Assess pain using appropriate pain scale  - Administer analgesics based on type and severity of pain and evaluate response  - Implement non-pharmacological measures as appropriate and evaluate response  - Consider cultural and social influences on pain and pain management  - Notify physician/advanced practitioner if interventions unsuccessful or patient reports new pain  Outcome: Progressing     Problem: INFECTION - ADULT  Goal: Absence or prevention of progression during hospitalization  Description: INTERVENTIONS:  - Assess and monitor for signs and symptoms of infection  - Monitor lab/diagnostic results  - Monitor all insertion sites, i e  indwelling lines, tubes, and drains  - Monitor endotracheal if appropriate and nasal secretions for changes in amount and color  - [place] appropriate cooling/warming therapies per order  - Administer medications as ordered  - Instruct and encourage patient and family to use good hand hygiene technique  - Identify and instruct in appropriate isolation precautions for identified infection/condition  Outcome: Progressing     Problem: SAFETY ADULT  Goal: Patient will remain free of falls  Description: INTERVENTIONS:  - Educate patient/family on patient safety including physical limitations  - Instruct patient to call for assistance with activity    - Keep Call bell within reach  - Keep bed low and locked with side rails adjusted as appropriate  - Keep care items and personal belongings within reach    -  Outcome: Progressing  Goal: Maintain or return to baseline ADL function  Description: INTERVENTIONS:  -  Assess patient's ability to carry out ADLs; assess patient's baseline for ADL function and identify physical deficits which impact ability to perform ADLs (bathing, care of mouth/teeth, toileting, grooming, dressing, etc )  - Assess/evaluate cause of self-care deficits   - Assess range of motion  - Assess patient's mobility; develop plan if impaired  - Assess patient's need for assistive devices and provide as appropriate  - Encourage maximum independence but intervene and supervise when necessary  - Involve family in performance of ADLs  - Assess for home care needs following discharge   - Consider OT consult to assist with ADL evaluation and planning for discharge  - Provide patient education as appropriate  Outcome: Progressing  Goal: Maintains/Returns to pre admission functional level  Description: INTERVENTIONS:  - Perform BMAT or MOVE assessment daily    - Set and communicate daily mobility goal to care team and patient/family/caregiver  Outcome: Progressing     Problem: DISCHARGE PLANNING  Goal: Discharge to home or other facility with appropriate resources  Description: INTERVENTIONS:  - Identify barriers to discharge w/patient and caregiver  - Arrange for needed discharge resources and transportation as appropriate  - Identify discharge learning needs (meds, wound care, etc )  - Arrange for interpretive services to assist at discharge as needed  - Refer to Case Management Department for coordinating discharge planning if the patient needs post-hospital services based on physician/advanced practitioner order or complex needs related to functional status, cognitive ability, or social support system  Outcome: Progressing     Problem: Knowledge Deficit  Goal: Patient/family/caregiver demonstrates understanding of disease process, treatment plan, medications, and discharge instructions  Description: Complete learning assessment and assess knowledge base    Interventions:  - Provide teaching at level of understanding  - Provide teaching via preferred learning methods  Outcome: Progressing     Problem: COPING  Goal: Pt/Family able to verbalize concerns and demonstrate effective coping strategies  Description: INTERVENTIONS:  - Assist patient/family to identify coping skills, available support systems and cultural and spiritual values  - Provide emotional support, including active listening and acknowledgement of concerns of patient and caregivers  - Reduce environmental stimuli, as able  - Provide patient education  - Assess for spiritual pain/suffering and initiate spiritual care, including notification of Pastoral Care or rc based community as needed  - Assess effectiveness of coping strategies  Outcome: Progressing  Goal: Will report anxiety at manageable levels  Description: INTERVENTIONS:  - Administer medication as ordered  - Teach and encourage coping skills  - Provide emotional support  - Assess patient/family for anxiety and ability to cope  Outcome: Progressing     Problem: CARDIOVASCULAR - ADULT  Goal: Maintains optimal cardiac output and hemodynamic stability  Description: INTERVENTIONS:  - Monitor I/O, vital signs and rhythm  - Monitor for S/S and trends of decreased cardiac output  - Administer and titrate ordered vasoactive medications to optimize hemodynamic stability  - Assess quality of pulses, skin color and temperature  - Assess for signs of decreased coronary artery perfusion  - Instruct patient to report change in severity of symptoms  Outcome: Progressing  Goal: Absence of cardiac dysrhythmias or at baseline rhythm  Description: INTERVENTIONS:  - Continuous cardiac monitoring, vital signs, obtain 12 lead EKG if ordered  - Administer antiarrhythmic and heart rate control medications as ordered  - Monitor electrolytes and administer replacement therapy as ordered  Outcome: Progressing     Problem: Potential for Falls  Goal: Patient will remain free of falls  Description: INTERVENTIONS:  - Educate patient/family on patient safety including physical limitations  - Instruct patient to call for assistance with activity    - Keep Call bell within reach  - Keep bed low and locked with side rails adjusted as appropriate  - Keep care items and personal belongings within reach    -  Outcome: Progressing

## 2022-03-25 NOTE — ASSESSMENT & PLAN NOTE
· Possibly related to recent COVID infection/viral  · Medically stable  · Continue colchicine b i d   For 3 months  · Outpatient follow-up with cardiology as scheduled on April 8  · Patient was advised by Cardiology not to undergo strenuous activities for 3 months

## 2022-03-25 NOTE — PROGRESS NOTES
[address] [place] [address]  Progress Note Sharath Johnson 2000, 23 y o  female MRN: [id number]  Unit/Bed#: E4 -01 Encounter: [id number]  Primary Care Provider: Susan Mosley MD   Date and time admitted to hospital: 3/23/2022  7:06 PM    * Elevated troponin  Assessment & Plan  · Chest pain associated with elevated troponin and nonspecific ST changes  · Secondary to myopericarditis not due to NSTEMI  · Cardiac catheterization normal  · Chest pain resolved    Myopericarditis  Assessment & Plan  · Probably related to recent COVID infection/viral  · Medically stable  · Repeat echo pending  · Further management/follow-up per Cardiology  · Tolerating colchicine    Elevated serum creatinine  Assessment & Plan  Transient, related to acute illness/contrast exposure  Resolved with IV fluid    Asthma  Assessment & Plan  · History of Exercise induced asthma without exacerbation  · Active and plays softball  · Continue p r n  Albuterol    Migraine without aura and without status migrainosus, not intractable  Assessment & Plan  · Stable  · Continue Topamax    VTE Pharmacologic Prophylaxis:   Pharmacologic:  None  Mechanical VTE Prophylaxis in Place: No    Patient Centered Rounds: I have performed bedside rounds with nursing staff today  Discussions with Specialists or Other Care Team Provider:  Discussed with Dr Reji Toure    Education and Discussions with Family / Patient:  Discussed with mom jeannine mark    Time Spent for Care: 20 minutes  More than 50% of total time spent on counseling and coordination of care as described above      Current Length of Stay: 2 day(s)    Current Patient Status: Inpatient   Certification Statement: The patient will continue to require additional inpatient hospital stay due to Echocardiogram    Discharge Plan:  Anticipate discharge later today as long as echocardiogram okay    Code Status: Level 1 - Full Code      Subjective:   Seen and examined earlier during rounds with nurse Andressa Rojas  Denies chest pain  Denies diarrhea  Overall feels well  Expressed eagerness to go home today    Objective:     Vitals:   Temp (24hrs), Av 5 °F (36 4 °C), Min:97 °F (36 1 °C), Max:98 2 °F (36 8 °C)    Temp:  [97 °F (36 1 °C)-98 2 °F (36 8 °C)] 97 4 °F (36 3 °C)  HR:  [47-76] 62  Resp:  [18] 18  BP: ()/(50-73) 98/54  SpO2:  [98 %-100 %] 98 %  Body mass index is 24 18 kg/m²  Input and Output Summary (last 24 hours):     No intake or output data in the 24 hours ending 22 1015    Physical Exam:     Physical Exam  Constitutional:       Appearance: She is not ill-appearing or diaphoretic  HENT:      Head: Normocephalic and atraumatic  Nose: No rhinorrhea  Eyes:      General: No scleral icterus  Cardiovascular:      Rate and Rhythm: Regular rhythm  Heart sounds: No murmur heard  No gallop  Pulmonary:      Effort: Pulmonary effort is normal  No respiratory distress  Breath sounds: No wheezing or rales  Abdominal:      General: Abdomen is flat  Palpations: Abdomen is soft  Musculoskeletal:      Cervical back: Neck supple  Right lower leg: No edema  Left lower leg: No edema  Skin:     General: Skin is warm and dry  Neurological:      Mental Status: She is alert and oriented to person, place, and time     Psychiatric:         Mood and Affect: Mood normal          Behavior: Behavior normal        Additional Data:     Labs:    Results from last 7 days   Lab Units 22  0536   WBC Thousand/uL 6 68   HEMOGLOBIN g/dL 12 8   HEMATOCRIT % 37 2   PLATELETS Thousands/uL 232   NEUTROS PCT % 48   LYMPHS PCT % 40   MONOS PCT % 9   EOS PCT % 2     Results from last 7 days   Lab Units 22  0536 22  1057 22  1057   SODIUM mmol/L 143   < > 142   POTASSIUM mmol/L 4 0   < > 4 3   CHLORIDE mmol/L 110*   < > 111*   CO2 mmol/L 21   < > 21   BUN mg/dL 13   < > 19   CREATININE mg/dL 1 00   < > 0 98   ANION GAP mmol/L 12   < > 10   CALCIUM mg/dL 8 7   < > 8 4   ALBUMIN g/dL  --   --  3 6   TOTAL BILIRUBIN mg/dL  --   --  0 37   ALK PHOS U/L  --   --  69   ALT U/L  --   --  21   AST U/L  --   --  31   GLUCOSE RANDOM mg/dL 90   < > 82    < > = values in this interval not displayed  Results from last 7 days   Lab Units 03/23/22  1434   INR  1 08         Results from last 7 days   Lab Units 03/23/22 2008   HEMOGLOBIN A1C % 5 0       * I Have Reviewed All Lab Data Listed Above  * Additional Pertinent Lab Tests Reviewed: All Labs Within Last 24 Hours Reviewed    Imaging:    Imaging Reports Reviewed Today Include:  Cardiac catheterization      Recent Cultures (last 7 days):           Last 24 Hours Medication List:   Current Facility-Administered Medications   Medication Dose Route Frequency Provider Last Rate    acetaminophen  650 mg Oral Q4H PRN YINKA Carnes-C      albuterol  2 puff Inhalation Q6H PRN Danay Hampton, PA-C      calcium carbonate  1,000 mg Oral Daily PRN YINKA Carnes-C      colchicine  0 6 mg Oral BID Beto Clock, DO      melatonin  6 mg Oral HS YINKA Carnes-C      nitroglycerin  0 4 mg Sublingual Q5 Min PRN Danay Hampton PA-C      ondansetron  4 mg Intravenous Q6H PRN Danay Hampton PA-C      sodium chloride  125 mL/hr Intravenous Continuous Bessy Bae  mL/hr (03/24/22 0929)    topiramate  150 mg Oral BID YINKA Carnes-C          Today, Patient Was Seen By: Bessy Bae MD    ** Please Note: Dictation voice to text software may have been used in the creation of this document   **

## 2022-04-05 ENCOUNTER — HOSPITAL ENCOUNTER (OUTPATIENT)
Dept: MRI IMAGING | Facility: HOSPITAL | Age: 22
Discharge: HOME | End: 2022-04-05
Payer: COMMERCIAL

## 2022-04-05 DIAGNOSIS — I31.9 MYOPERICARDITIS: ICD-10-CM

## 2022-04-05 PROCEDURE — 75561 CARDIAC MRI FOR MORPH W/DYE: CPT

## 2022-04-05 PROCEDURE — A9585 GADOBUTROL INJECTION: HCPCS | Performed by: NURSE PRACTITIONER

## 2022-04-05 PROCEDURE — G1004 CDSM NDSC: HCPCS

## 2022-04-05 RX ADMIN — GADOBUTROL 14 ML: 604.72 INJECTION INTRAVENOUS at 11:37

## 2022-04-08 ENCOUNTER — OFFICE VISIT (OUTPATIENT)
Dept: CARDIOLOGY CLINIC | Facility: CLINIC | Age: 22
End: 2022-04-08
Payer: COMMERCIAL

## 2022-04-08 VITALS
DIASTOLIC BLOOD PRESSURE: 70 MMHG | BODY MASS INDEX: 25.22 KG/M2 | WEIGHT: 166.4 LBS | SYSTOLIC BLOOD PRESSURE: 106 MMHG | HEART RATE: 66 BPM | HEIGHT: 68 IN

## 2022-04-08 DIAGNOSIS — R77.8 ELEVATED TROPONIN: ICD-10-CM

## 2022-04-08 DIAGNOSIS — I31.9 MYOPERICARDITIS: Primary | ICD-10-CM

## 2022-04-08 DIAGNOSIS — Q21.1 PATENT FORAMEN OVALE: ICD-10-CM

## 2022-04-08 PROCEDURE — 99214 OFFICE O/P EST MOD 30 MIN: CPT | Performed by: INTERNAL MEDICINE

## 2022-04-08 PROCEDURE — 93000 ELECTROCARDIOGRAM COMPLETE: CPT | Performed by: INTERNAL MEDICINE

## 2022-04-08 RX ORDER — TOPIRAMATE 50 MG/1
50 TABLET, FILM COATED ORAL 2 TIMES DAILY
COMMUNITY
Start: 2022-03-05 | End: 2022-04-29 | Stop reason: HOSPADM

## 2022-04-08 NOTE — PROGRESS NOTES
Cardiology Office Note  MD Urbano Rivera MD Brunetta Deal, DO, MD Khloe Wilkes DO, Kusum Treviño DO, [facility] - [place]  ----------------------------------------------------------------  [address] [place] [address]    Lana King 23 y o  female MRN: [id number]  Unit/Bed#:  Encounter: [id number]      History of Present Illness: It was a pleasure to see Lana King in the office today for initial CV evaluation  She has a past medical history of myopericarditis, asthma and anxiety  She established care with us in March 2022  Patient experienced an episode of chest discomfort that occurred in March 2022  The discomfort was described as sharp in nature and worse with deep inspiration  She notes when she stood up from the bed, the discomfort occurred it was associated with worsening shortness of breath, headache and lightheadedness  She rated the discomfort as 8/10 pain  The discomfort improved with rest and worsened with exertion  It progressed throughout the day and into the evening  Eventually she presented to [facility] for evaluation  Cardiac enzymes increased over 3800 of the high sensitivity troponin  She subsequently underwent cardiac catheterization  Cardiac catheterization demonstrated normal coronary arteries  Echocardiogram initially showed low normal function, but repeat showed normal systolic function with ejection fraction of 55% with trivial valvular disease  She was subsequently discharged home  Since that time, she has felt well without any symptoms  She denies any chest pain, pressure, tightness or squeezing  Denies lightheadedness, dizziness or palpitations  Denies lower extremity swelling, orthopnea or paroxysmal nocturnal dyspnea  She underwent cardiac MRI and is here today to discuss the results        Review of Systems:  Review of Systems   Constitutional: Negative for decreased appetite, fever, weight gain and weight loss  HENT: Negative for congestion and sore throat  Eyes: Negative for visual disturbance  Cardiovascular: Negative for chest pain, dyspnea on exertion, leg swelling, near-syncope and palpitations  Respiratory: Negative for cough and shortness of breath  Hematologic/Lymphatic: Negative for bleeding problem  Skin: Negative for rash  Musculoskeletal: Negative for myalgias and neck pain  Gastrointestinal: Negative for abdominal pain and nausea  Neurological: Negative for light-headedness and weakness  Psychiatric/Behavioral: Negative for depression  Past Medical History:   Diagnosis Date    Allergies     Asthma     Migraine        Past Surgical History:   Procedure Laterality Date    CARDIAC CATHETERIZATION N/A 3/24/2022    Procedure: Cardiac catheterization;  Surgeon: Ledy Iglesias MD;  Location: [facility]; Service: Cardiology    CARDIAC CATHETERIZATION N/A 3/24/2022    Procedure: Cardiac Coronary Angiogram;  Surgeon: Ledy Iglesias MD;  Location: [facility]; Service: Cardiology       Social History     Socioeconomic History    Marital status: Single     Spouse name: None    Number of children: None    Years of education: None    Highest education level: None   Occupational History    None   Tobacco Use    Smoking status: Never Smoker    Smokeless tobacco: Never Used   Vaping Use    Vaping Use: Never used   Substance and Sexual Activity    Alcohol use: Yes    Drug use: No    Sexual activity: Yes   Other Topics Concern    None   Social History Narrative    None     Social Determinants of Health     Financial Resource Strain: Not on file   Food Insecurity: Not on file   Transportation Needs: Not on file   Physical Activity: Not on file   Stress: Not on file   Social Connections: Not on file   Intimate Partner Violence: Not on file   Housing Stability: Not on file       History reviewed   No pertinent family history  No Known Allergies      Current Outpatient Medications:     albuterol (ProAir HFA) 90 mcg/act inhaler, Inhale 2 puffs every 6 (six) hours as needed (exercise induced asthma), Disp: 8 5 g, Rfl: 0    colchicine (COLCRYS) 0 6 mg tablet, Take 1 tablet (0 6 mg total) by mouth 2 (two) times a day, Disp: 60 tablet, Rfl: 2    topiramate (TOPAMAX) 100 mg tablet, Take 100 mg by mouth 2 (two) times a day, Disp: , Rfl:     topiramate (TOPAMAX) 50 MG tablet, Take 50 mg by mouth 2 (two) times a day, Disp: , Rfl:     Vitals:    04/08/22 1422   BP: 106/70   Pulse: 66   Weight: 75 5 kg (166 lb 6 4 oz)   Height: 5' 8" (1 727 m)     Body mass index is 25 3 kg/m²  PHYSICAL EXAMINATION:  Gen: Awake, Alert, NAD   Head/eyes: AT/NC, pupils equal and round, Anicteric  ENT: mmm  Neck: Supple, No elevated JVP, trachea midline  Resp: CTA bilaterally no w/r/r  CV: RRR +S1, S2, No m/r/g  Abd: Soft, NT/ND + BS  Ext: no LE edema bilaterally  Neuro: Follows commands, moves all extermities  Psych: Appropriate affect, normal mood, pleasant attitude, non-combative  Skin: warm; no rash, erythema or venous stasis changes on exposed skin    --------------------------------------------------------------------------------  TREADMILL STRESS  No results found for this or any previous visit      --------------------------------------------------------------------------------  NUCLEAR STRESS TEST: No results found for this or any previous visit  No results found for this or any previous visit       --------------------------------------------------------------------------------  CATH:  No results found for this or any previous visit     --------------------------------------------------------------------------------  ECHO:   No results found for this or any previous visit      No results found for this or any previous visit     --------------------------------------------------------------------------------  HOLTER  No results found for this or any previous visit  No results found for this or any previous visit     --------------------------------------------------------------------------------  CAROTIDS  No results found for this or any previous visit      --------------------------------------------------------------------------------  ECGs:  Results for orders placed or performed in visit on 04/08/22   POCT ECG    Impression    Sinus rhythm 66 bpm, normal ECG        Lab Results   Component Value Date    WBC 6 68 03/25/2022    HGB 12 8 03/25/2022    HCT 37 2 03/25/2022    MCV 89 03/25/2022     03/25/2022      Lab Results   Component Value Date    SODIUM 143 03/25/2022    K 4 0 03/25/2022     (H) 03/25/2022    CO2 21 03/25/2022    BUN 13 03/25/2022    CREATININE 1 00 03/25/2022    GLUC 90 03/25/2022    CALCIUM 8 7 03/25/2022      Lab Results   Component Value Date    HGBA1C 5 0 03/23/2022      No results found for: CHOL  Lab Results   Component Value Date    HDL 57 03/23/2022    HDL 35 (L) 02/20/2020     Lab Results   Component Value Date    LDLCALC 113 (H) 03/23/2022    LDLCALC 116 (H) 02/20/2020     Lab Results   Component Value Date    TRIG 15 03/23/2022    TRIG 123 02/20/2020     No results found for: [place], [place]   Lab Results   Component Value Date    INR 1 08 03/23/2022    PROTIME 13 9 03/23/2022        1  Myopericarditis  -     POCT ECG  -     Stress test only, exercise; Future; Expected date: 06/15/2022    2  Elevated troponin    3   Patent foramen ovale        IMPRESSION:   Myopericarditis    Normal coronary arteries by cardiac catheterization, March 2022   LVEF 43%, normal diastolic function, small to moderate PFO with left-to-right shunting, trace MR/TR w/ PASP 18 mmHg by 2D echo, March 2022   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 by cardiac MRI, April 2022   Asthma   Anxiety  · History of COVID 19 infection, January 2022  · History of vasovagal syncope  · Family history of CAD    PLAN:  It was a pleasure to see Camlia Martinez in the office today for follow-up CV evaluation  She is here today due to her history of myocarditis  She has been taking her colchicine without any reported adverse effects  ECG is grossly normal without ischemic changes  Cardiac MRI was performed demonstrating patchy intra myocardial fibrosis of the anterolateral wall  She has no symptoms concerning for angina and no signs or symptoms of heart failure  She examines to be euvolemic in the office today  Blood pressure and heart rate are currently stable  Based on her clinical presentation, I have the following recommendations:    1  Due to her evidence of myocarditis noted on cardiac MRI, I have recommended completion of 3 months of colchicine and avoiding high-intensity activity for 3 month duration  2  At the end of 3 months, we will check an exercise stress test for completeness prior to her returning to activity  3  No changes to her medications at this time  4  Should she have recurrence of her symptoms, especially worsening in frequency or severity or change in quality, recommend seeking immediate medical attention  5  We will follow up with her in 2 months  As always, please do not hesitate to call with any questions  Portions of the record may have been created with voice recognition software  Occasional wrong word or "sound a like" substitutions may have occurred due to the inherent limitations of voice recognition software  Read the chart carefully and recognize, using context, where substitutions have occurred        Signed: Vinny Cabrera DO, Rosina Beers

## 2022-04-13 ENCOUNTER — PATIENT MESSAGE (OUTPATIENT)
Dept: CARDIOLOGY CLINIC | Facility: CLINIC | Age: 22
End: 2022-04-13

## 2022-04-19 ENCOUNTER — TELEPHONE (OUTPATIENT)
Dept: CARDIOLOGY CLINIC | Facility: CLINIC | Age: 22
End: 2022-04-19

## 2022-04-19 NOTE — TELEPHONE ENCOUNTER
Mother called asking to have form completed that was forwarded in my chart 4/13/22  Printed and placed in Dr Bernie Sanchez for completion

## 2022-04-20 NOTE — TELEPHONE ENCOUNTER
Attempted to fax completed form several times to [facility]  Transmission failed  Scanned form in patient chart and sent message to see if she is able to  form

## 2022-04-20 NOTE — TELEPHONE ENCOUNTER
Placed call to patient to see if she can  form  She is able to   Form will be placed up front in  bin

## 2022-04-22 ENCOUNTER — TELEPHONE (OUTPATIENT)
Dept: CARDIOLOGY CLINIC | Facility: CLINIC | Age: 22
End: 2022-04-22

## 2022-04-22 NOTE — TELEPHONE ENCOUNTER
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

## 2022-04-25 ENCOUNTER — OFFICE VISIT (OUTPATIENT)
Dept: CARDIOLOGY CLINIC | Facility: CLINIC | Age: 22
End: 2022-04-25
Payer: COMMERCIAL

## 2022-04-25 VITALS
HEIGHT: 68 IN | DIASTOLIC BLOOD PRESSURE: 80 MMHG | SYSTOLIC BLOOD PRESSURE: 124 MMHG | HEART RATE: 62 BPM | BODY MASS INDEX: 25.61 KG/M2 | WEIGHT: 169 LBS

## 2022-04-25 DIAGNOSIS — Q21.1 PATENT FORAMEN OVALE: ICD-10-CM

## 2022-04-25 DIAGNOSIS — I31.9 MYOPERICARDITIS: Primary | ICD-10-CM

## 2022-04-25 DIAGNOSIS — Z86.16 HISTORY OF COVID-19: ICD-10-CM

## 2022-04-25 PROCEDURE — 99214 OFFICE O/P EST MOD 30 MIN: CPT | Performed by: INTERNAL MEDICINE

## 2022-04-25 NOTE — LETTER
April 25, 2022     Patient: eNlli Martinez  YOB: 2000  Date of Visit: 4/25/2022      To Whom it May Concern:    Nelli Martinez ([birth year]) is under my professional care  Susan Damico was seen in my office on 4/25/2022  Susan Damico suffered a serious COVID infection with significant cardiac complications that in all likelihood would very well account for her inability keep up academically from March 24th 2022 until present  Please take this into consideration  If you have any questions or concerns, please don't hesitate to call           Sincerely,          Gael Claude, DO        CC: No Recipients

## 2022-04-25 NOTE — PROGRESS NOTES
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  She has been on colchicine since  She was advised to refrain from sports and significant physical activity for a total of 3 months time until stress testing can be undertaken  She was advised to continue colchicine for 3 months  She is here for 2nd opinion regarding activity restriction  She would like to participate in competitive softball  She presents with her mom, a likely conversation ensued regarding treatment of COVID myocarditis as compared to viral myocarditis guidelines  Also explained the risk of cardiac arrhythmia in layman's terms as best possible today which can sometimes be triggered by physical exertion with underlying myocarditis, how these areas can be proarrhythmic  Other than fatigue and generalized malaise, Lencho Butler has no cardiopulmonary symptoms whatsoever any longer  No pleuritic chest pain or alarming shortness of breath  Denies any fevers or rigors, recent ESR and CRP within normal limits  EF on testing has been normal   No TIA claudication or other stroke-like symptoms evident  The following portions of the patient's history were reviewed and updated as appropriate: allergies, current medications, past family history, past medical history, past social history, past surgical history and problem list   Review of Systems   Constitutional: Positive for malaise/fatigue  Negative for chills, diaphoresis and weight gain  HENT: Negative for nosebleeds and stridor  Eyes: Negative for double vision, vision loss in left eye, vision loss in right eye and visual disturbance  Cardiovascular: Negative for chest pain, claudication, cyanosis, dyspnea on exertion, irregular heartbeat, leg swelling, near-syncope, orthopnea, palpitations, paroxysmal nocturnal dyspnea and syncope  Respiratory: Negative for cough, shortness of breath, snoring and wheezing  Endocrine: Negative for polydipsia, polyphagia and polyuria  Hematologic/Lymphatic: Negative for bleeding problem  Does not bruise/bleed easily  Skin: Negative for flushing and rash  Musculoskeletal: Negative for falls and myalgias  Gastrointestinal: Negative for abdominal pain, heartburn, hematemesis, hematochezia, melena and nausea  Genitourinary: Negative for hematuria  Neurological: Negative for brief paralysis, dizziness, focal weakness, headaches, light-headedness, loss of balance and vertigo  Psychiatric/Behavioral: Negative for altered mental status and substance abuse  Allergic/Immunologic: Negative for hives            Objective:      /80   Pulse 62   Ht 5' 8" (1 727 m)   Wt 76 7 kg (169 lb)   BMI 25 70 kg/m²   Physical Exam    Lab Review:   Admission on 03/23/2022, Discharged on 03/25/2022   Component Date Value    PTT 03/23/2022 135*    HS TnI random 03/23/2022 3,035*    Amph/Meth UR 03/24/2022 Negative     Barbiturate Ur 03/24/2022 Negative     Benzodiazepine Urine 03/24/2022 Negative     Cocaine Urine 03/24/2022 Negative     Methadone Urine 03/24/2022 Negative     Opiate Urine 03/24/2022 Negative     PCP Ur 03/24/2022 Negative     THC Urine 03/24/2022 Negative     Oxycodone Urine 03/24/2022 Negative     Cholesterol 03/23/2022 173     Triglycerides 03/23/2022 15     HDL, Direct 03/23/2022 57     LDL Calculated 03/23/2022 113*    Hemoglobin A1C 03/23/2022 5 0     EAG 03/23/2022 97     PTT 03/24/2022 35     Sodium 03/24/2022 143     Potassium 03/24/2022 3 7     Chloride 03/24/2022 110*    CO2 03/24/2022 24     ANION GAP 03/24/2022 9     BUN 03/24/2022 22     Creatinine 03/24/2022 1 32*    Glucose 03/24/2022 88     Calcium 03/24/2022 8 8     eGFR 03/24/2022 58     WBC 03/24/2022 6 83     RBC 03/24/2022 4 25     Hemoglobin 03/24/2022 13 2     Hematocrit 03/24/2022 40 1     MCV 03/24/2022 94     MCH 03/24/2022 31 1     MCHC 03/24/2022 32 9     RDW 03/24/2022 12 6     MPV 03/24/2022 10 5     Platelets 16/61/9598 249     nRBC 03/24/2022 0     Neutrophils Relative 03/24/2022 43     Immat GRANS % 03/24/2022 0     Lymphocytes Relative 03/24/2022 44     Monocytes Relative 03/24/2022 9     Eosinophils Relative 03/24/2022 3     Basophils Relative 03/24/2022 1     Neutrophils Absolute 03/24/2022 2 97     Immature Grans Absolute 03/24/2022 0 01     Lymphocytes Absolute 03/24/2022 3 03     Monocytes Absolute 03/24/2022 0 58     Eosinophils Absolute 03/24/2022 0 19     Basophils Absolute 03/24/2022 0 05     HS TnI random 03/24/2022 3,866*    PTT 03/24/2022 101*    LDL Direct 03/24/2022 97     Sed Rate 03/24/2022 <1     CRP 03/24/2022 <3 0     Sodium 03/24/2022 142     Potassium 03/24/2022 4 3     Chloride 03/24/2022 111*    CO2 03/24/2022 21     ANION GAP 03/24/2022 10     BUN 03/24/2022 19     Creatinine 03/24/2022 0 98     Glucose 03/24/2022 82     Calcium 03/24/2022 8 4     AST 03/24/2022 31     ALT 03/24/2022 21     Alkaline Phosphatase 03/24/2022 69     Total Protein 03/24/2022 6 6     Albumin 03/24/2022 3 6     Total Bilirubin 03/24/2022 0 37     eGFR 03/24/2022 83     Clarity, UA 03/24/2022 Clear     Color, UA 03/24/2022 Yellow     Specific Gravity, UA 03/24/2022 1 020     pH, UA 03/24/2022 6 5     Glucose, UA 03/24/2022 Negative     Ketones, UA 03/24/2022 Trace*    Blood, UA 03/24/2022 Negative     Protein, UA 03/24/2022 Negative     Nitrite, UA 03/24/2022 Negative     Bilirubin, UA 03/24/2022 Negative     Urobilinogen, UA 03/24/2022 0 2     Leukocytes, UA 03/24/2022 Negative     WBC, UA 03/24/2022 0-1*    RBC, UA 03/24/2022 None Seen     Bacteria, UA 03/24/2022 Occasional     Epithelial Cells 03/24/2022 Occasional     Ventricular Rate 03/24/2022 59     Atrial Rate 03/24/2022 59     [place] Interval 03/24/2022 150     QRSD Interval 03/24/2022 72     QT Interval 03/24/2022 462     QTC Interval 03/24/2022 457     P Axis 03/24/2022 61     QRS Axis 03/24/2022 38     T Wave Axis 03/24/2022 20     A4C EF 03/25/2022 59     TR Peak Cody 03/25/2022 1 9     Triscuspid Valve Regurgi* 03/25/2022 15 0     Tricuspid valve peak reg* 03/25/2022 1 94     LV EF 03/25/2022 55     WBC 03/25/2022 6 68     RBC 03/25/2022 4 19     Hemoglobin 03/25/2022 12 8     Hematocrit 03/25/2022 37 2     MCV 03/25/2022 89     MCH 03/25/2022 30 5     MCHC 03/25/2022 34 4     RDW 03/25/2022 12 6     MPV 03/25/2022 10 5     Platelets 40/89/3309 232     nRBC 03/25/2022 0     Neutrophils Relative 03/25/2022 48     Immat GRANS % 03/25/2022 0     Lymphocytes Relative 03/25/2022 40     Monocytes Relative 03/25/2022 9     Eosinophils Relative 03/25/2022 2     Basophils Relative 03/25/2022 1     Neutrophils Absolute 03/25/2022 3 22     Immature Grans Absolute 03/25/2022 0 02     Lymphocytes Absolute 03/25/2022 2 65     Monocytes Absolute 03/25/2022 0 60     Eosinophils Absolute 03/25/2022 0 14     Basophils Absolute 03/25/2022 0 05     Sodium 03/25/2022 143     Potassium 03/25/2022 4 0     Chloride 03/25/2022 110*    CO2 03/25/2022 21     ANION GAP 03/25/2022 12     BUN 03/25/2022 13     Creatinine 03/25/2022 1 00     Glucose 03/25/2022 90     Calcium 03/25/2022 8 7     eGFR 03/25/2022 81     Rapid HIV 1 AND 2 03/25/2022 Non-Reactive     HIV-1 P24 Ag Screen 03/25/2022 Non-Reactive    Admission on 03/23/2022, Discharged on 03/23/2022   Component Date Value    WBC 03/23/2022 6 14     RBC 03/23/2022 4 55     Hemoglobin 03/23/2022 14 3     Hematocrit 03/23/2022 42 7     MCV 03/23/2022 94     MCH 03/23/2022 31 4     MCHC 03/23/2022 33 5     RDW 03/23/2022 12 6     MPV 03/23/2022 10 4     Platelets 35/92/6875 265     nRBC 03/23/2022 0     Neutrophils Relative 03/23/2022 66     Immat GRANS % 03/23/2022 0     Lymphocytes Relative 03/23/2022 25     Monocytes Relative 03/23/2022 7     Eosinophils Relative 03/23/2022 2     Basophils Relative 03/23/2022 0     Neutrophils Absolute 03/23/2022 4 05     Immature Grans Absolute 03/23/2022 0 01     Lymphocytes Absolute 03/23/2022 1 52     Monocytes Absolute 03/23/2022 0 45     Eosinophils Absolute 03/23/2022 0 09     Basophils Absolute 03/23/2022 0 02     hs TnI 0hr 03/23/2022 31     Sodium 03/23/2022 139     Potassium 03/23/2022 4 0     Chloride 03/23/2022 109*    CO2 03/23/2022 21     ANION GAP 03/23/2022 9     BUN 03/23/2022 19     Creatinine 03/23/2022 1 07     Glucose 03/23/2022 85     Calcium 03/23/2022 9 2     AST 03/23/2022 20     ALT 03/23/2022 18     Alkaline Phosphatase 03/23/2022 66     Total Protein 03/23/2022 7 2     Albumin 03/23/2022 4 4     Total Bilirubin 03/23/2022 0 41     eGFR 03/23/2022 74     Magnesium 03/23/2022 2 0     TSH 3RD GENERATON 03/23/2022 1 170     EXT PREG TEST UR (Ref: N* 03/23/2022 negative     Control 03/23/2022 valid     hs TnI 2hr 03/23/2022 1,051*    Delta 2hr hsTnI 03/23/2022 1,020*    hs TnI 4hr 03/23/2022 2,433*    Delta 4hr hsTnI 03/23/2022 2,402*    D-Dimer, Quant 03/23/2022 0 57*    Protime 03/23/2022 13 9     INR 03/23/2022 1 08     PTT 03/23/2022 33     AV area peak cody 03/23/2022 2 6     LA size 03/23/2022 2 7     Aortic valve mean veloci* 03/23/2022 7 10     Triscuspid Valve Regurgi* 03/23/2022 14 0     Tricuspid valve peak reg* 03/23/2022 1 86     LVPWd 03/23/2022 0 70     Left Atrium Area-systoli* 03/23/2022 13     MV E' Tissue Velocity Se* 03/23/2022 14     TR Peak Cody 03/23/2022 1 9     IVSd 03/23/2022 1 16     LV DIASTOLIC VOLUME (MOD* 79/41/7044 127     LEFT VENTRICLE SYSTOLIC * 58/69/5710 51  Left ventricular stroke * 03/23/2022 76 00     A4C EF 03/23/2022 58     LVIDd 03/23/2022 5 20     IVS 03/23/2022 0 7     LVIDS 03/23/2022 3 50     FS 03/23/2022 33     Asc Ao 03/23/2022 2 8     Ao root 03/23/2022 2 70     RVID d 03/23/2022 3 7     LVOT mn grad 03/23/2022 1 0     AV area by cont VTI 03/23/2022 2 4     AV mean gradient 03/23/2022 2     AV LVOT peak gradient 03/23/2022 3     MV valve area p 1/2 meth* 03/23/2022 2 10     E wave deceleration time 03/23/2022 363     LVOT diameter 03/23/2022 2 0     LVOT peak cody 03/23/2022 0 84     LVOT peak VTI 03/23/2022 19 14     Ao peak cody retrograde 03/23/2022 1 01     Ao VTI 03/23/2022 24 86     LVOT stroke volume 03/23/2022 60 10     AV peak gradient 03/23/2022 4     MV Peak E Cody 03/23/2022 77     MV Peak A Cody 03/23/2022 0 34     IOANA A4C 03/23/2022 13     RAA A4C 03/23/2022 14 1     MV stenosis pressure 1/2* 03/23/2022 105     LVOT SI 03/23/2022 31 50     LVSV, 2D 03/23/2022 76     LVOT area 03/23/2022 3 14     AV Velocity Ratio 03/23/2022 0 83     AV valve area 03/23/2022 2 42     Ao asc z-score 03/23/2022 1 49     ZLVPWD 03/23/2022 -0 25     ZLVIDS 03/23/2022 0 78     ZLVIDD 03/23/2022 0 12     ZIVSD 03/23/2022 -0 36     LV EF 03/23/2022 50     Ventricular Rate 03/23/2022 67     Atrial Rate 03/23/2022 67     [place] Interval 03/23/2022 154     QRSD Interval 03/23/2022 74     QT Interval 03/23/2022 446     QTC Interval 03/23/2022 471     P Axis 03/23/2022 67     QRS Axis 03/23/2022 70     T Wave Axis 03/23/2022 27     Ventricular Rate 03/23/2022 60     Atrial Rate 03/23/2022 60     [place] Interval 03/23/2022 164     QRSD Interval 03/23/2022 72     QT Interval 03/23/2022 444     QTC Interval 03/23/2022 444     P Axis 03/23/2022 71     QRS Axis 03/23/2022 65     T Wave Axis 03/23/2022 25     Ventricular Rate 03/23/2022 58     Atrial Rate 03/23/2022 58     [place] Interval 03/23/2022 156     QRSD Interval 03/23/2022 74     QT Interval 03/23/2022 472     QTC Interval 03/23/2022 463     P Axis 03/23/2022 74     QRS Axis 03/23/2022 66     T Wave Axis 03/23/2022 37     Ventricular Rate 03/23/2022 63     Atrial Rate 03/23/2022 63     [place] Interval 03/23/2022 148     QRSD Interval 03/23/2022 72     QT Interval 03/23/2022 458     QTC Interval 03/23/2022 468     P Axis 03/23/2022 48     QRS Axis 03/23/2022 63     T Wave [place] 03/23/2022 23      MRI cardiac  w wo contrast    Result Date: 4/8/2022  Narrative: 23year old woman for evaluation for myopericarditis  Technique: 1  3 plane SSFP localizers  2  SSFP cine imaging in long and short axis plane  3  T2 weighted DIR FSE in short axis plane  4  14 ml gadobutrol power injected  5  2D inversion recovery FGRE for delayed myocardial enhancement  6  The patient tolerated the procedure well without complication  Measurements: Az-septal wall 7 mm Postero-lateral wall 6 mm Left ventricular end-diastolic dimension 54 mm Left ventricular end-systolic dimension 40 mm Left ventricular end-diastolic volume 771 ml Left ventricular end-systolic volume  54 ml Stroke volume 77 ml Cardiac Output 4 2 L/min Ejection fraction 59 % Left atrium 29 mm Aortic Root 24 mm Findings:  1  The left ventricle is normal in size with normal wall thickness  Left ventricular systolic function is normal with a measured ejection fraction of 59%  There are no regional left ventricular wall motion abnormalities  2  The right ventricle is normal in size with normal right ventricular systolic function  3  The aortic, mitral, and tricuspid valves open without restriction  There is no significant valvular regurgitation, however cine MRI is inaccurate in the qualitative assessment of valvular regurgitation  4  The left atrium is normal in size  The aortic root is normal in size  5  There is no evidence of myocardial edema   6  Delayed post-gadolinium imaging demonstrates patchy intramyocardial hyperenhancement in the mid anterolateral wall  Impression: Impression: 1  Normal left and right ventricular size and systolic function  2  No significant valvular abnormalities  3  Patchy intramyocardial fibrosis in the mid anterolateral wall  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  Workstation performed: [id number]         Assessment:       1  Myopericarditis  Echo stress test, exercise   2  History of COVID-19     3  Patent foramen ovale  Echo stress test, exercise        Plan:       I had a lengthily discussion with Jose Elias Jtbindu and her mother today, told her I agree with Dr Katya Copeland stance on activity restriction  I tried to stress the significance of her abnormal findings in the way of biochemical evidence and MRI evidence of myocardial damage here  At her age I feel the risk alone outweighs the benefit of resuming competitive sports or intermediate to high levels of aerobic activity at this point in time  I told her the risk of arrhythmias significant  I also feel the benefit outweighs the potential risk of colchicine for a total of 3 months time therapy here  I told her I would not pursue any further investigation of her PFO, it seems quite small, and unless her migraines would become crippling would not investigate or entertain closure, even then I might pause at such  I told her there are many positives here, her EF is normal, she had no thrombotic/embolic events, and we know her coronaries are normal which is not a surprise, but all her valves are also normal   She was happy to hear all this information  Despite being sad Radhadaphne Watersbindu seems to be coping quite well, understandably upset that I provided similar recommendations to her today    I did briefly entertain that we could repeat a cardiac MRI 6 weeks after the 1st and if normal an exercise tolerance test normal without ectopy I would clear her, but she said this would probably be too late  For these reasons for now she is going to continue colchicine, withhold from strenuous activity and competitive sports, and we will order a stress echocardiogram in late June to hopefully clear her for full activity without restriction at that time  She is considering some summer sports  Patient and mother requested a note that they can provide to school as she has gotten behind on some school work  They both gave me verbal permission to disclose her medical illness in said letter, particularly COVID and that she had cardiac complications from such  As long as the stress echo at the end of June is normal she will be cleared without restriction for all activity and can follow-up here p r n  Only

## 2022-04-25 NOTE — PATIENT INSTRUCTIONS
Cholesterol and Your Health   AMBULATORY CARE:   Cholesterol  is a waxy, fat-like substance  Your body uses cholesterol to make hormones and new cells, and to protect nerves  Cholesterol is made by your body  It also comes from certain foods you eat, such as meat and dairy products  Your healthcare provider can help you set goals for your cholesterol levels  He or she can help you create a plan to meet your goals  Cholesterol level goals: Your cholesterol level goals depend on your risk for heart disease, your age, and your other health conditions  The following are general guidelines:  · Total cholesterol  includes low-density lipoprotein (LDL), high-density lipoprotein (HDL), and triglyceride levels  The total cholesterol level should be lower than 200 mg/dL and is best at about 150 mg/dL  · LDL cholesterol  is called bad cholesterol  because it forms plaque in your arteries  As plaque builds up, your arteries become narrow, and less blood flows through  When plaque decreases blood flow to your heart, you may have chest pain  If plaque completely blocks an artery that brings blood to your heart, you may have a heart attack  Plaque can break off and form blood clots  Blood clots may block arteries in your brain and cause a stroke  The level should be less than 130 mg/dL and is best at about 100 mg/dL  · HDL cholesterol  is called good cholesterol  because it helps remove LDL cholesterol from your arteries  It does this by attaching to LDL cholesterol and carrying it to your liver  Your liver breaks down LDL cholesterol so your body can get rid of it  High levels of HDL cholesterol can help prevent a heart attack and stroke  Low levels of HDL cholesterol can increase your risk for heart disease, heart attack, and stroke  The level should be 60 mg/dL or higher  · Triglycerides  are a type of fat that store energy from foods you eat  High levels of triglycerides also cause plaque buildup   This can increase your risk for a heart attack or stroke  If your triglyceride level is high, your LDL cholesterol level may also be high  The level should be less than 150 mg/dL  Any of the following can increase your risk for high cholesterol:   · Smoking cigarettes    · Being overweight or obese, or not getting enough exercise    · Drinking large amounts of alcohol    · A medical condition such as hypertension (high blood pressure) or diabetes    · Certain genes passed from your parents to you    · Age older than 65 years    What you need to know about having your cholesterol levels checked: Adults 21to 39years of age should have their cholesterol levels checked every 4 to 6 years  Adults 45 years or older should have their cholesterol checked every 1 to 2 years  You may need your cholesterol checked more often, or at a younger age, if you have risk factors for heart disease  You may also need to have your cholesterol checked more often if you have other health conditions, such as diabetes  Blood tests are used to check cholesterol levels  Blood tests measure your levels of triglycerides, LDL cholesterol, and HDL cholesterol  How healthy fats affect your cholesterol levels:  Healthy fats, also called unsaturated fats, help lower LDL cholesterol and triglyceride levels  Healthy fats include the following:  · Monounsaturated fats  are found in foods such as olive oil, canola oil, avocado, nuts, and olives  · Polyunsaturated fats,  such as omega 3 fats, are found in fish, such as salmon, trout, and tuna  They can also be found in plant foods such as flaxseed, walnuts, and soybeans  How unhealthy fats affect your cholesterol levels:  Unhealthy fats increase LDL cholesterol and triglyceride levels  They are found in foods high in cholesterol, saturated fat, and trans fat:  · Cholesterol  is found in eggs, dairy, and meat  · Saturated fat  is found in butter, cheese, ice cream, whole milk, and coconut oil  Saturated fat is also found in meat, such as sausage, hot dogs, and bologna  · Trans fat  is found in liquid oils and is used in fried and baked foods  Foods that contain trans fats include chips, crackers, muffins, sweet rolls, microwave popcorn, and cookies  Treatment  for high cholesterol will also decrease your risk of heart disease, heart attack, and stroke  Treatment may include any of the following:  · Lifestyle changes  may include food, exercise, weight loss, and quitting smoking  You may also need to decrease the amount of alcohol you drink  Your healthcare provider will want you to start with lifestyle changes  Other treatment may be added if lifestyle changes are not enough  Your healthcare provider may recommend you work with a team to manage hyperlipidemia  The team may include medical experts such as a dietitian, an exercise or physical therapist, and a behavior therapist  Your family members may be included in helping you create lifestyle changes  · Medicines  may be given to lower your LDL cholesterol, triglyceride levels, or total cholesterol level  You may need medicines to lower your cholesterol if any of the following is true:    ? You have a history of stroke, TIA, unstable angina, or a heart attack  ? Your LDL cholesterol level is 190 mg/dL or higher  ? You are age 36 to 76 years, have diabetes or heart disease risk factors, and your LDL cholesterol is 70 mg/dL or higher  · Supplements  include fish oil, red yeast rice, and garlic  Fish oil may help lower your triglyceride and LDL cholesterol levels  It may also increase your HDL cholesterol level  Red yeast rice may help decrease your total cholesterol level and LDL cholesterol level  Garlic may help lower your total cholesterol level  Do not take any supplements without talking to your healthcare provider  Food changes you can make to lower your cholesterol levels:  A dietitian can help you create a healthy eating plan  He or she can show you how to read food labels and choose foods low in saturated fat, trans fats, and cholesterol  · Decrease the total amount of fat you eat  Choose lean meats, fat-free or 1% fat milk, and low-fat dairy products, such as yogurt and cheese  Try to limit or avoid red meats  Limit or do not eat fried foods or baked goods, such as cookies  · Replace unhealthy fats with healthy fats  Cook foods in olive oil or canola oil  Choose soft margarines that are low in saturated fat and trans fat  Seeds, nuts, and avocados are other examples of healthy fats  · Eat foods with omega-3 fats  Examples include salmon, tuna, mackerel, walnuts, and flaxseed  Eat fish 2 times per week  Pregnant women should not eat fish that have high levels of mercury, such as shark, swordfish, and joanne mackerel  · Increase the amount of high-fiber foods you eat  High-fiber foods can help lower your LDL cholesterol  Aim to get between 20 and 30 grams of fiber each day  Fruits and vegetables are high in fiber  Eat at least 5 servings each day  Other high-fiber foods are whole-grain or whole-wheat breads, pastas, or cereals, and brown rice  Eat 3 ounces of whole-grain foods each day  Increase fiber slowly  You may have abdominal discomfort, bloating, and gas if you add fiber to your diet too quickly  · Eat healthy protein foods  Examples include low-fat dairy products, skinless chicken and turkey, fish, and nuts  · Limit foods and drinks that are high in sugar  Your dietitian or healthcare provider can help you create daily limits for high-sugar foods and drinks  The limit may be lower if you have diabetes or another health condition  Limits can also help you lose weight if needed  Lifestyle changes you can make to lower your cholesterol levels:   · Maintain a healthy weight  Ask your healthcare provider what a healthy weight is for you   Ask him or her to help you create a weight loss plan if needed  Weight loss can decrease your total cholesterol and triglyceride levels  Weight loss may also help keep your blood pressure at a healthy level  · Be physically active throughout the day  Physical activity, such as exercise, can help lower your total cholesterol level and maintain a healthy weight  Physical activity can also help increase your HDL cholesterol level  Work with your healthcare provider to create an program that is right for you  Get at least 30 to 40 minutes of moderate physical activity most days of the week  Examples of exercise include brisk walking, swimming, or biking  Also include strength training at least 2 times each week  Your healthcare providers can help you create a physical activity plan  · Do not smoke  Nicotine and other chemicals in cigarettes and cigars can raise your cholesterol levels  Ask your healthcare provider for information if you currently smoke and need help to quit  E-cigarettes or smokeless tobacco still contain nicotine  Talk to your healthcare provider before you use these products  · Limit or do not drink alcohol  Alcohol can increase your triglyceride levels  Ask your healthcare provider before you drink alcohol  Ask how much is okay for you to drink in 24 hours or 1 week  Follow up with your doctor as directed:  Write down your questions so you remember to ask them during your visits  © Copyright [employer] 2022 Information is for End User's use only and may not be sold, redistributed or otherwise used for commercial purposes  All illustrations and images included in CareNotes® are the copyrighted property of [employer]  or Rashmi Jenkins   The above information is an  only  It is not intended as medical advice for individual conditions or treatments  Talk to your doctor, nurse or pharmacist before following any medical regimen to see if it is safe and effective for you

## 2022-04-27 ENCOUNTER — TELEPHONE (OUTPATIENT)
Dept: CARDIOLOGY CLINIC | Facility: CLINIC | Age: 22
End: 2022-04-27

## 2022-04-27 PROCEDURE — 99285 EMERGENCY DEPT VISIT HI MDM: CPT

## 2022-04-27 NOTE — TELEPHONE ENCOUNTER
Patient's mom called  Patient had an episode of tachycardia/palpitations (rate unknown), lasting for about 30 seconds, while laying down  She also did have weakness in her left arm  She was told to call if she experienced any symptoms  Please advise

## 2022-04-27 NOTE — TELEPHONE ENCOUNTER
Please advise if okay to add addendum to original form stating needs additional time to complete work due to increased fatigue and nausea  Please advise

## 2022-04-28 ENCOUNTER — APPOINTMENT (INPATIENT)
Dept: NON INVASIVE DIAGNOSTICS | Facility: HOSPITAL | Age: 22
DRG: 103 | End: 2022-04-28
Payer: COMMERCIAL

## 2022-04-28 ENCOUNTER — HOSPITAL ENCOUNTER (INPATIENT)
Facility: HOSPITAL | Age: 22
LOS: 1 days | Discharge: HOME | DRG: 103 | End: 2022-04-29
Attending: EMERGENCY MEDICINE | Admitting: INTERNAL MEDICINE
Payer: COMMERCIAL

## 2022-04-28 ENCOUNTER — APPOINTMENT (OUTPATIENT)
Dept: MRI IMAGING | Facility: HOSPITAL | Age: 22
DRG: 103 | End: 2022-04-28
Payer: COMMERCIAL

## 2022-04-28 ENCOUNTER — APPOINTMENT (EMERGENCY)
Dept: CT IMAGING | Facility: HOSPITAL | Age: 22
DRG: 103 | End: 2022-04-28
Payer: COMMERCIAL

## 2022-04-28 DIAGNOSIS — R29.898 LEFT ARM WEAKNESS: ICD-10-CM

## 2022-04-28 DIAGNOSIS — I31.9 MYOPERICARDITIS: ICD-10-CM

## 2022-04-28 DIAGNOSIS — R06.02 SOB (SHORTNESS OF BREATH): Primary | ICD-10-CM

## 2022-04-28 DIAGNOSIS — R29.90 STROKE-LIKE SYMPTOMS: ICD-10-CM

## 2022-04-28 PROBLEM — S80.12XA CONTUSION OF LEFT LEG: Status: RESOLVED | Noted: 2019-07-24 | Resolved: 2022-04-28

## 2022-04-28 PROBLEM — Q21.12 PFO (PATENT FORAMEN OVALE): Status: ACTIVE | Noted: 2022-04-28

## 2022-04-28 PROBLEM — R55 SYNCOPE AND COLLAPSE: Status: RESOLVED | Noted: 2019-01-09 | Resolved: 2022-04-28

## 2022-04-28 PROBLEM — S80.812A ABRASION OF LEFT LEG: Status: RESOLVED | Noted: 2019-07-24 | Resolved: 2022-04-28

## 2022-04-28 PROBLEM — R79.89 ELEVATED TROPONIN: Status: RESOLVED | Noted: 2022-03-23 | Resolved: 2022-04-28

## 2022-04-28 PROBLEM — G43.009 MIGRAINE WITHOUT AURA AND WITHOUT STATUS MIGRAINOSUS, NOT INTRACTABLE: Chronic | Status: ACTIVE | Noted: 2019-01-09

## 2022-04-28 PROBLEM — Q21.1 PFO (PATENT FORAMEN OVALE): Status: ACTIVE | Noted: 2022-04-28

## 2022-04-28 PROBLEM — R77.8 ELEVATED TROPONIN: Status: RESOLVED | Noted: 2022-03-23 | Resolved: 2022-04-28

## 2022-04-28 LAB
2HR DELTA HS TROPONIN: >1 NG/L
4HR DELTA HS TROPONIN: >0 NG/L
ALBUMIN SERPL BCP-MCNC: 4.4 G/DL (ref 3.5–5)
ALP SERPL-CCNC: 64 U/L (ref 34–104)
ALT SERPL W P-5'-P-CCNC: 10 U/L (ref 7–52)
ANION GAP SERPL CALCULATED.3IONS-SCNC: 8 MMOL/L (ref 4–13)
AORTIC ROOT: 2.7 CM
AORTIC VALVE MEAN VELOCITY: 6.7 M/S
APICAL FOUR CHAMBER EJECTION FRACTION: 56 %
AST SERPL W P-5'-P-CCNC: 14 U/L (ref 13–39)
ATRIAL RATE: 61 BPM
AV LVOT MEAN GRADIENT: 1 MMHG
AV LVOT PEAK GRADIENT: 3 MMHG
AV MEAN GRADIENT: 2 MMHG
AV PEAK GRADIENT: 3 MMHG
AV VELOCITY RATIO: 0.87
BASOPHILS # BLD AUTO: 0.05 THOUSANDS/ΜL (ref 0–0.1)
BASOPHILS NFR BLD AUTO: 1 % (ref 0–1)
BILIRUB SERPL-MCNC: 0.19 MG/DL (ref 0.2–1)
BNP SERPL-MCNC: 16 PG/ML (ref 0–100)
BUN SERPL-MCNC: 20 MG/DL (ref 5–25)
CALCIUM SERPL-MCNC: 9.5 MG/DL (ref 8.4–10.2)
CARDIAC TROPONIN I PNL SERPL HS: 2 NG/L
CARDIAC TROPONIN I PNL SERPL HS: 3 NG/L
CARDIAC TROPONIN I PNL SERPL HS: <2 NG/L
CHLORIDE SERPL-SCNC: 106 MMOL/L (ref 96–108)
CHOLEST SERPL-MCNC: 181 MG/DL
CK SERPL-CCNC: 49 U/L (ref 26–192)
CO2 SERPL-SCNC: 23 MMOL/L (ref 21–32)
CREAT SERPL-MCNC: 1.03 MG/DL (ref 0.6–1.3)
CRP SERPL QL: <1 MG/L
DOP CALC AO PEAK VEL: 0.92 M/S
DOP CALC AO VTI: 24.54 CM
DOP CALC LVOT PEAK VEL VTI: 18.18 CM
DOP CALC LVOT PEAK VEL: 0.8 M/S
DOP CALC RVOT PEAK VEL: 0.6 M/S
DOP CALC RVOT VTI: 16.49 CM
E WAVE DECELERATION TIME: 175 MS
EOSINOPHIL # BLD AUTO: 0.13 THOUSAND/ΜL (ref 0–0.61)
EOSINOPHIL NFR BLD AUTO: 2 % (ref 0–6)
ERYTHROCYTE [DISTWIDTH] IN BLOOD BY AUTOMATED COUNT: 11.4 % (ref 11.6–15.1)
ERYTHROCYTE [SEDIMENTATION RATE] IN BLOOD: 1 MM/HOUR (ref 0–19)
EST. AVERAGE GLUCOSE BLD GHB EST-MCNC: 103 MG/DL
FRACTIONAL SHORTENING: 28 % (ref 28–44)
GFR SERPL CREATININE-BSD FRML MDRD: 78 ML/MIN/1.73SQ M
GLUCOSE SERPL-MCNC: 87 MG/DL (ref 65–140)
HBA1C MFR BLD: 5.2 %
HCG SERPL QL: NEGATIVE
HCT VFR BLD AUTO: 41.2 % (ref 34.8–46.1)
HDLC SERPL-MCNC: 48 MG/DL
HGB BLD-MCNC: 13.9 G/DL (ref 11.5–15.4)
IMM GRANULOCYTES # BLD AUTO: 0.02 THOUSAND/UL (ref 0–0.2)
IMM GRANULOCYTES NFR BLD AUTO: 0 % (ref 0–2)
INTERVENTRICULAR SEPTUM IN DIASTOLE (PARASTERNAL SHORT AXIS VIEW): 0.6 CM
INTERVENTRICULAR SEPTUM: 0.6 CM (ref 0.52–0.98)
LAAS-AP4: 18.8 CM2
LACTATE SERPL-SCNC: 0.6 MMOL/L (ref 0.5–2)
LDLC SERPL CALC-MCNC: 112 MG/DL (ref 0–100)
LEFT ATRIUM SIZE: 3 CM
LEFT INTERNAL DIMENSION IN SYSTOLE: 3.3 CM (ref 2.71–4.11)
LEFT VENTRICULAR INTERNAL DIMENSION IN DIASTOLE: 4.6 CM (ref 4.44–6.61)
LEFT VENTRICULAR POSTERIOR WALL IN END DIASTOLE: 0.7 CM (ref 0.51–0.97)
LEFT VENTRICULAR STROKE VOLUME: 51 ML
LIPASE SERPL-CCNC: 21 U/L (ref 11–82)
LVSV (TEICH): 51 ML
LYMPHOCYTES # BLD AUTO: 3.39 THOUSANDS/ΜL (ref 0.6–4.47)
LYMPHOCYTES NFR BLD AUTO: 38 % (ref 14–44)
MAGNESIUM SERPL-MCNC: 2 MG/DL (ref 1.9–2.7)
MCH RBC QN AUTO: 31.4 PG (ref 26.8–34.3)
MCHC RBC AUTO-ENTMCNC: 33.7 G/DL (ref 31.4–37.4)
MCV RBC AUTO: 93 FL (ref 82–98)
MONOCYTES # BLD AUTO: 0.61 THOUSAND/ΜL (ref 0.17–1.22)
MONOCYTES NFR BLD AUTO: 7 % (ref 4–12)
MV E'TISSUE VEL-SEP: 14 CM/S
MV PEAK A VEL: 0.4 M/S
MV PEAK E VEL: 86 CM/S
MV STENOSIS PRESSURE HALF TIME: 51 MS
MV VALVE AREA P 1/2 METHOD: 4.31 CM2
NEUTROPHILS # BLD AUTO: 4.63 THOUSANDS/ΜL (ref 1.85–7.62)
NEUTS SEG NFR BLD AUTO: 52 % (ref 43–75)
NRBC BLD AUTO-RTO: 0 /100 WBCS
P AXIS: 50 DEGREES
PLATELET # BLD AUTO: 259 THOUSANDS/UL (ref 149–390)
PMV BLD AUTO: 10.7 FL (ref 8.9–12.7)
POTASSIUM SERPL-SCNC: 3.5 MMOL/L (ref 3.5–5.3)
PR INTERVAL: 142 MS
PROT SERPL-MCNC: 7 G/DL (ref 6.4–8.4)
PV MEAN GRADIENT: 1 MMHG
PV MEAN VELOCITY: 58 CM/S
PV PEAK GRADIENT: 2 MMHG
PV PEAK VELOCITY: 74 CM/S
PV VTI: 21.25 CM
QRS AXIS: 52 DEGREES
QRSD INTERVAL: 76 MS
QT INTERVAL: 428 MS
QTC INTERVAL: 430 MS
RBC # BLD AUTO: 4.43 MILLION/UL (ref 3.81–5.12)
RIGHT VENTRICLE ID DIMENSION: 2.9 CM
SL CV LV EF: 50
SL CV PED ECHO LEFT VENTRICLE DIASTOLIC VOLUME (MOD BIPLANE) 2D: 96 ML
SL CV PED ECHO LEFT VENTRICLE SYSTOLIC VOLUME (MOD BIPLANE) 2D: 45 ML
SL CV RVOT MAX GRADIENT: 1 MMHG
SL CV RVOT MEAN GRADIENT: 1 MMHG
SL CV RVOT VMEAN: 0.44 M/S
SODIUM SERPL-SCNC: 137 MMOL/L (ref 135–147)
T WAVE AXIS: 24 DEGREES
TR MAX PG: 9 MMHG
TR PEAK VELOCITY: 1.5 M/S
TRICUSPID VALVE PEAK REGURGITATION VELOCITY: 1.48 M/S
TRIGL SERPL-MCNC: 103 MG/DL
TSH SERPL DL<=0.05 MIU/L-ACNC: 1.69 UIU/ML (ref 0.45–4.5)
VENTRICULAR RATE: 61 BPM
WBC # BLD AUTO: 8.83 THOUSAND/UL (ref 4.31–10.16)
Z-SCORE OF INTERVENTRICULAR SEPTUM IN END DIASTOLE: -1.31
Z-SCORE OF LEFT VENTRICULAR DIMENSION IN END DIASTOLE: -1.64
Z-SCORE OF LEFT VENTRICULAR DIMENSION IN END SYSTOLE: -0.09
Z-SCORE OF LEFT VENTRICULAR POSTERIOR WALL IN END DIASTOLE: -0.34

## 2022-04-28 PROCEDURE — 99220 PR INITIAL OBSERVATION CARE/DAY 70 MINUTES: CPT | Performed by: INTERNAL MEDICINE

## 2022-04-28 PROCEDURE — 36415 COLL VENOUS BLD VENIPUNCTURE: CPT | Performed by: EMERGENCY MEDICINE

## 2022-04-28 PROCEDURE — 99222 1ST HOSP IP/OBS MODERATE 55: CPT | Performed by: INTERNAL MEDICINE

## 2022-04-28 PROCEDURE — 86140 C-REACTIVE PROTEIN: CPT | Performed by: PHYSICIAN ASSISTANT

## 2022-04-28 PROCEDURE — 83880 ASSAY OF NATRIURETIC PEPTIDE: CPT | Performed by: EMERGENCY MEDICINE

## 2022-04-28 PROCEDURE — 80053 COMPREHEN METABOLIC PANEL: CPT | Performed by: EMERGENCY MEDICINE

## 2022-04-28 PROCEDURE — 82550 ASSAY OF CK (CPK): CPT | Performed by: EMERGENCY MEDICINE

## 2022-04-28 PROCEDURE — 85652 RBC SED RATE AUTOMATED: CPT | Performed by: PHYSICIAN ASSISTANT

## 2022-04-28 PROCEDURE — 93356 MYOCRD STRAIN IMG SPCKL TRCK: CPT | Performed by: INTERNAL MEDICINE

## 2022-04-28 PROCEDURE — 85025 COMPLETE CBC W/AUTO DIFF WBC: CPT | Performed by: EMERGENCY MEDICINE

## 2022-04-28 PROCEDURE — G1004 CDSM NDSC: HCPCS

## 2022-04-28 PROCEDURE — 83605 ASSAY OF LACTIC ACID: CPT | Performed by: EMERGENCY MEDICINE

## 2022-04-28 PROCEDURE — 92523 SPEECH SOUND LANG COMPREHEN: CPT

## 2022-04-28 PROCEDURE — 93005 ELECTROCARDIOGRAM TRACING: CPT

## 2022-04-28 PROCEDURE — 97165 OT EVAL LOW COMPLEX 30 MIN: CPT

## 2022-04-28 PROCEDURE — 70496 CT ANGIOGRAPHY HEAD: CPT

## 2022-04-28 PROCEDURE — 93306 TTE W/DOPPLER COMPLETE: CPT | Performed by: INTERNAL MEDICINE

## 2022-04-28 PROCEDURE — 70551 MRI BRAIN STEM W/O DYE: CPT

## 2022-04-28 PROCEDURE — 84703 CHORIONIC GONADOTROPIN ASSAY: CPT | Performed by: EMERGENCY MEDICINE

## 2022-04-28 PROCEDURE — 71275 CT ANGIOGRAPHY CHEST: CPT

## 2022-04-28 PROCEDURE — 93356 MYOCRD STRAIN IMG SPCKL TRCK: CPT

## 2022-04-28 PROCEDURE — 83735 ASSAY OF MAGNESIUM: CPT | Performed by: EMERGENCY MEDICINE

## 2022-04-28 PROCEDURE — 83690 ASSAY OF LIPASE: CPT | Performed by: EMERGENCY MEDICINE

## 2022-04-28 PROCEDURE — 97163 PT EVAL HIGH COMPLEX 45 MIN: CPT

## 2022-04-28 PROCEDURE — 84484 ASSAY OF TROPONIN QUANT: CPT | Performed by: EMERGENCY MEDICINE

## 2022-04-28 PROCEDURE — 70498 CT ANGIOGRAPHY NECK: CPT

## 2022-04-28 PROCEDURE — 96360 HYDRATION IV INFUSION INIT: CPT

## 2022-04-28 PROCEDURE — 83036 HEMOGLOBIN GLYCOSYLATED A1C: CPT | Performed by: PHYSICIAN ASSISTANT

## 2022-04-28 PROCEDURE — 80061 LIPID PANEL: CPT | Performed by: PHYSICIAN ASSISTANT

## 2022-04-28 PROCEDURE — 99285 EMERGENCY DEPT VISIT HI MDM: CPT | Performed by: EMERGENCY MEDICINE

## 2022-04-28 PROCEDURE — 84443 ASSAY THYROID STIM HORMONE: CPT | Performed by: PHYSICIAN ASSISTANT

## 2022-04-28 PROCEDURE — 93306 TTE W/DOPPLER COMPLETE: CPT

## 2022-04-28 PROCEDURE — 93010 ELECTROCARDIOGRAM REPORT: CPT | Performed by: INTERNAL MEDICINE

## 2022-04-28 RX ORDER — COLCHICINE 0.6 MG/1
0.6 TABLET ORAL 2 TIMES DAILY
Status: DISCONTINUED | OUTPATIENT
Start: 2022-04-28 | End: 2022-04-29

## 2022-04-28 RX ORDER — ATORVASTATIN CALCIUM 40 MG/1
40 TABLET, FILM COATED ORAL EVERY EVENING
Status: DISCONTINUED | OUTPATIENT
Start: 2022-04-28 | End: 2022-04-28

## 2022-04-28 RX ORDER — DIPHENHYDRAMINE HCL 25 MG
50 TABLET ORAL ONCE
Status: COMPLETED | OUTPATIENT
Start: 2022-04-28 | End: 2022-04-28

## 2022-04-28 RX ORDER — ASPIRIN 81 MG/1
324 TABLET, CHEWABLE ORAL ONCE
Status: COMPLETED | OUTPATIENT
Start: 2022-04-28 | End: 2022-04-28

## 2022-04-28 RX ORDER — ACETAMINOPHEN 325 MG/1
650 TABLET ORAL EVERY 4 HOURS PRN
Status: DISCONTINUED | OUTPATIENT
Start: 2022-04-28 | End: 2022-04-29 | Stop reason: HOSPADM

## 2022-04-28 RX ORDER — POTASSIUM CHLORIDE 20 MEQ/1
40 TABLET, EXTENDED RELEASE ORAL ONCE
Status: COMPLETED | OUTPATIENT
Start: 2022-04-28 | End: 2022-04-28

## 2022-04-28 RX ORDER — ASPIRIN 81 MG/1
81 TABLET, CHEWABLE ORAL DAILY
Status: DISCONTINUED | OUTPATIENT
Start: 2022-04-29 | End: 2022-04-28

## 2022-04-28 RX ADMIN — COLCHICINE 0.6 MG: 0.6 TABLET, FILM COATED ORAL at 08:37

## 2022-04-28 RX ADMIN — SODIUM CHLORIDE 1000 ML: 0.9 INJECTION, SOLUTION INTRAVENOUS at 00:49

## 2022-04-28 RX ADMIN — ENOXAPARIN SODIUM 40 MG: 100 INJECTION SUBCUTANEOUS at 08:37

## 2022-04-28 RX ADMIN — TOPIRAMATE 150 MG: 100 TABLET, FILM COATED ORAL at 08:36

## 2022-04-28 RX ADMIN — COLCHICINE 0.6 MG: 0.6 TABLET, FILM COATED ORAL at 18:22

## 2022-04-28 RX ADMIN — POTASSIUM CHLORIDE 40 MEQ: 1500 TABLET, EXTENDED RELEASE ORAL at 08:35

## 2022-04-28 RX ADMIN — ASPIRIN 81 MG 324 MG: 81 TABLET ORAL at 03:40

## 2022-04-28 RX ADMIN — TOPIRAMATE 150 MG: 100 TABLET, FILM COATED ORAL at 18:23

## 2022-04-28 RX ADMIN — DIPHENHYDRAMINE HCL 50 MG: 25 TABLET ORAL at 03:40

## 2022-04-28 RX ADMIN — IOHEXOL 120 ML: 350 INJECTION, SOLUTION INTRAVENOUS at 01:57

## 2022-04-28 NOTE — ED NOTES
Pt sleeping with easy respirations; easily arousable for neuro/vital checks; no needs at this time; will continue to monitor     Jayshree Ann RN  04/28/22 2898

## 2022-04-28 NOTE — ASSESSMENT & PLAN NOTE
Small to moderate PFO on Echo 3/25/22 with left to right shunting    Patient now with stroke-like symptoms and being w/u for stroke    MRI pending    Repeat echo pending

## 2022-04-28 NOTE — ED PROCEDURE NOTE
PROCEDURE  ECG 12 Lead Documentation Only    Date/Time: 4/28/2022 12:56 AM  Performed by: Prieto Hines MD  Authorized by: Prieto Hines MD     Indications / Diagnosis:  Sob, left arm weak/numbness  ECG reviewed by me, the ED Provider: yes    Patient location:  ED  Interpretation:     Interpretation: normal    Rate:     ECG rate:  61    ECG rate assessment: normal    Rhythm:     Rhythm: sinus rhythm    Ectopy:     Ectopy: none    QRS:     QRS axis:  Normal    QRS intervals:  Normal  Conduction:     Conduction: normal    ST segments:     ST segments:  Normal  T waves:     T waves: normal           Prieto Hines MD  04/28/22 6995 [Follow-Up: _____] : a [unfilled] follow-up visit

## 2022-04-28 NOTE — CONSULTS
[address] 2000, 23 y o  female MRN: [id number]  Unit/Bed#: ED 26 Encounter: [id number]  Primary Care Provider: Hans Conroy MD   Date and time admitted to hospital: 4/28/2022 12:24 AM    Inpatient consult to Cardiology  Consult performed by: Holger Finch PA-C  Consult ordered by: Michael Harris DO          PFO (patent foramen ovale)  Assessment & Plan  Small to moderate PFO on Echo 3/25/22 with left to right shunting    Patient now with stroke-like symptoms and being w/u for stroke    MRI pending    Repeat echo pending     Myopericarditis  Assessment & Plan  Cardiac MRI showing Myocarditis in 3/2022   Patient on Colchicine therapy for three months  HS troponin levels have normalized   Will repeat inflammatory markers: ESR and CRP   Planned for exercise stress testing in June       Migraine without aura and without status migrainosus, not intractable  Assessment & Plan  Patient on Topamax daily     * Stroke-like symptoms  Assessment & Plan  Presents with several days of SOB, CP and on and off symptoms of numbness and paresthesias in the left arm  Also had symptoms of full body tremors   Patient does admit to missing a few doses of her Topamax  Patient had Echo 3/25/22 showing  small to moderate PFO with left to right shunting    Undergoing w/u for stroke with MRI of the Brain       Thank you for allowing us to see this pleasant patient in consult  We will follow course along with you and make outpatient plans outlined above with all arrangements  Primary Cardiologist: Dr Theresa Baez    Chief Complaint:  Chief Complaint   Patient presents with    Shortness of Breath     Pt reports SOB and chest tightness; pt also reports intermittent numbess and weakness of left arm over past few days        History of Present Illness: The patient is a 61-year-old who had a history of COVID in January    She developed pleuritic chest pain in late March with marked troponin elevations which led to a cardiac catheterization that revealed normal coronaries  This led to a cardiac MRI which revealed myocarditis  She was placed on colchicine since then and was advised to stay on colchicine for 3 months, at which time stress testing can be performed  She is scheduled for stress testing in June  Her echo revealed a small moderate PFO with left-to-right shunt  She also has a history of daily migraines for which she takes Topamax  She states that she starting to feel some what better and her chest pain was improving  It is now a 1/10  However,  her breathing is worsening  This is what brings her to the ED today  She is experiencing progressively worsening SOB especially with exertion  Yesterday she was unable to make it up a flight of stairs and has to sit down on the stairs to rest  She is also experiencing intermittent paresthesias and numbness in her left arm  In addition she had one episode of whole-body tremors  HS troponin levels are negative and EKG is non-ischemic  She is sleep in the bed and does not appear to be in any distress  Review of Systems: a 10 point review of systems was conducted and is negative except for as mentioned in the HPI or as below  Review of Systems   Constitutional: Negative  HENT: Negative  Eyes: Negative  Cardiovascular: Positive for chest pain and dyspnea on exertion  Negative for claudication, cyanosis, irregular heartbeat, leg swelling, near-syncope, orthopnea, palpitations, paroxysmal nocturnal dyspnea and syncope  Respiratory: Negative  Negative for cough, hemoptysis, shortness of breath, sleep disturbances due to breathing, snoring, sputum production and wheezing  Endocrine: Negative  Hematologic/Lymphatic: Negative  Skin: Negative  Musculoskeletal: Negative  Gastrointestinal: Negative  Genitourinary: Negative      Neurological: Positive for paresthesias (Left arm intermittently) and tremors ( 1 episode of full body tremors)  Psychiatric/Behavioral: Negative  Allergic/Immunologic: Negative  Past Medical and Surgical History:  Past Medical History:   Diagnosis Date    Allergies     Asthma     Migraine      Past Surgical History:   Procedure Laterality Date    CARDIAC CATHETERIZATION N/A 3/24/2022    Procedure: Cardiac catheterization;  Surgeon: Brook Mixon MD;  Location: [facility]; Service: Cardiology    CARDIAC CATHETERIZATION N/A 3/24/2022     Social History     Substance and Sexual Activity   Alcohol Use Yes     Social History     Substance and Sexual Activity   Drug Use No     Social History     Tobacco Use   Smoking Status Never Smoker   Smokeless Tobacco Never Used       Family History:  Family History   Problem Relation Age of Onset    No Known Problems Mother     No Known Problems Father     Clotting disorder Family        Medication:  (Not in a hospital admission)       Allergies:  No Known Allergies    Physical Exam:  Vitals: Blood pressure 93/63, pulse (!) 49, temperature (!) 97 4 °F (36 3 °C), temperature source Temporal, resp  rate 15, height 5' 8" (1 727 m), weight 76 2 kg (168 lb), last menstrual period 04/07/2022, SpO2 98 %, not currently breastfeeding , Body mass index is 25 54 kg/m² ,   Orthostatic Blood Pressures      Most Recent Value   Blood Pressure 93/63 filed at 04/28/2022 0930   Patient Position - Orthostatic VS Lying filed at 04/28/2022 9853      , Systolic (12GYY), SUP:896 , Min:63 , YRJ:993   , Diastolic (97OJY), CKY:28, Min:50, Max:73    Physical Exam  Vitals and nursing note reviewed  Constitutional:       Appearance: She is well-developed  HENT:      Head: Normocephalic and atraumatic  Mouth/Throat:      Mouth: Mucous membranes are moist    Eyes:      General: No scleral icterus  Conjunctiva/sclera: Conjunctivae normal    Neck:      Vascular: No JVD  Trachea: No tracheal deviation     Cardiovascular:      Rate and Rhythm: Normal rate and regular rhythm  Pulses: Intact distal pulses  Heart sounds: Normal heart sounds, S1 normal and S2 normal  No murmur heard  No friction rub  No gallop  Pulmonary:      Effort: Pulmonary effort is normal  No respiratory distress  Breath sounds: Normal breath sounds  No wheezing or rales  Chest:      Chest wall: No tenderness  Abdominal:      General: Bowel sounds are normal  There is no distension  Palpations: Abdomen is soft  Tenderness: There is no abdominal tenderness  Comments: Aorta not palpable    Musculoskeletal:         General: No tenderness  Normal range of motion  Cervical back: Normal range of motion and neck supple  Right lower leg: No edema  Left lower leg: No edema  Skin:     General: Skin is warm and dry  Coloration: Skin is not pale  Findings: No erythema or rash  Neurological:      General: No focal deficit present  Mental Status: She is alert and oriented to person, place, and time  Psychiatric:         Mood and Affect: Mood normal          Behavior: Behavior normal          Judgment: Judgment normal            Most Recent Cardiac Imaging:   Echo 03/25/2022:  Normal LVEF 50% no DD, small moderate sized PFO with left to right shunting, trace TR PASP 18 mm Hg       Cardiac catheterization 03/24/2022 normal coronaries    Cardiac MRI 04/05/2022 consistent with myocarditis normal LV EF 59%      EKG:  Normal sinus rhythm    Lab Results:   Troponins:   Results from last 7 days   Lab Units 04/28/22  0435 04/28/22  0246 04/28/22  0046   HS TNI 0HR ng/L  --   --  <2   HS TNI 2HR ng/L  --  3  --    HSTNI D2 ng/L  --  >1  --    HS TNI 4HR ng/L 2  --   --    HSTNI D4 ng/L >0  --   --    CK TOTAL U/L  --   --  49      BNP:  Results from last 6 Months   Lab Units 04/28/22  0045   BNP pg/mL 16     CBC with diff:   Results from last 7 days   Lab Units 04/28/22  0045   WBC Thousand/uL 8 83   HEMOGLOBIN g/dL 13 9   HEMATOCRIT % 41 2   PLATELETS Thousands/uL 259   RBC Million/uL 4 43     CMP:  Results from last 7 days   Lab Units 04/28/22  0046   POTASSIUM mmol/L 3 5   CHLORIDE mmol/L 106   BUN mg/dL 20   CREATININE mg/dL 1 03   CALCIUM mg/dL 9 5   AST U/L 14   ALT U/L 10   ALK PHOS U/L 64   EGFR ml/min/1 73sq m 78     Lipid Profile:  Results from last 7 days   Lab Units 04/28/22  0046   CHOLESTEROL mg/dL 181   TRIGLYCERIDES mg/dL 103   HDL mg/dL 48*   LDL CALC mg/dL 112*

## 2022-04-28 NOTE — ASSESSMENT & PLAN NOTE
Presents with several days of SOB, CP and on and off symptoms of numbness and paresthesias in the left arm  Also had symptoms of full body tremors   Patient does admit to missing a few doses of her Topamax  Patient had Echo 3/25/22 showing  small to moderate PFO with left to right shunting    Undergoing w/u for stroke with MRI of the Brain

## 2022-04-28 NOTE — ASSESSMENT & PLAN NOTE
Cardiac MRI showing Myocarditis in 3/2022   Patient on Colchicine therapy for three months  HS troponin levels have normalized   Will repeat inflammatory markers: ESR and CRP   Planned for exercise stress testing in June

## 2022-04-28 NOTE — OCCUPATIONAL THERAPY NOTE
Occupational Therapy Evaluation      America Chery    4/28/2022    Patient Active Problem List   Diagnosis    Migraine without aura and without status migrainosus, not intractable    Chronic daily headache    Anxiety disorder    Asthma    Myopericarditis    Stroke-like symptoms       Past Medical History:   Diagnosis Date    Allergies     Asthma     Migraine        Past Surgical History:   Procedure Laterality Date    CARDIAC CATHETERIZATION N/A 3/24/2022    Procedure: Cardiac catheterization;  Surgeon: Antonio Patel MD;  Location: [facility];   Service: Cardiology    CARDIAC CATHETERIZATION N/A 3/24/2022        04/28/22 0819   OT Last Visit   OT Visit Date 04/28/22   Note Type   Note type Evaluation   Restrictions/Precautions   Weight Bearing Precautions Per Order No   Other Precautions Multiple lines;Telemetry   Pain Assessment   Pain Assessment Tool 0-10   Pain Score No Pain   Home Living   Type of [address] [place] [address] Two level;Bed/bath upstairs;Stairs to enter with rails  (6 ARLEN )   Bathroom Shower/Tub Walk-in shower   Bathroom Toilet Standard   Bathroom Equipment   (no DME )   [address]   (no AD at baseline )   Prior Function   Level of [place] Independent with ADLs and functional mobility   Lives With [employer] Help From Family   ADL Assistance Independent   IADLs Independent   Falls in the last 6 months 0   Vocational Other (Comment)  ("nothing right now")   Comments (+) driving    Lifestyle   Autonomy PTA, pt reports being (I) with ADL/IADLs, lives with family in 2 story home with 6 ARLEN  (+) driving     Reciprocal Relationships family    Intrinsic Gratification "nothing right now"    ADL   Eating Assistance 7  Independent   Grooming Assistance 7  Independent   UB Bathing Assistance 7  Independent   LB [address] Assistance  7  Independent   Toileting Deficit   ((I) clothing management, pericare )   Functional Assistance 7  Independent   Bed Mobility   Supine to Sit 6  Modified independent   Additional items HOB elevated   Sit to Supine 6  Modified independent   Additional items HOB elevated   Additional Comments Reported initial lightheaded/dizziness c positional changes, symptomatic resolution ~1 min sitting EOB  Transfers   Sit to Stand 7  Independent   Stand to Sit 7  Independent   Toilet transfer 7  Independent   Functional Mobility   Functional Mobility 5  Supervision   Additional Comments Funcitonal mobility bed<>bathroom, one small LOB with self correction  Additional items   (no AD )   Balance   Static Sitting Normal   Dynamic Sitting Normal   Static Standing Good   Dynamic Standing Fair +   Activity Tolerance   Activity Tolerance Patient limited by fatigue  (pt reports not sleeping well last night )   Medical Staff Made Aware CM    Nurse Made Aware Commucation with RN following session    RUE Assessment   RUE Assessment WFL  (equal strength B/L )   LUE Assessment   LUE Assessment WFL  (equal strength B/L )   Hand Function   Gross Motor Coordination Functional   Fine Motor Coordination Functional   Sensation   Light Touch No apparent deficits  (L hand numbness POA, reports fully resolved )   Vision-Basic Assessment   Current Vision Wears contacts   Patient Visual Report Other (Comment)  ("when I stand up, my vision gets blurry" )   Vision - Complex Assessment   Additional Comments reports blurriness/lightheadedness with positional changes occasionally, resolves ~1 min normally  Cognition   Overall Cognitive Status WFL   Arousal/Participation Alert; Responsive; Cooperative   Attention Within functional limits   Orientation Level Oriented X4   Memory Within functional limits   Following Commands Follows all commands and directions without difficulty   Comments Pt agreeable to OT session, pleasant      Assessment Assessment Patient is a 23 y o  female seen for OT evaluation at [place] [id number] following admission on 4/28/2022  s/p Stroke-like symptoms  Comorbidities impacting functional performance include: migraine without aura , myopericarditis, anxiety, asthma, and chronic daily headaches  Patient presents with active orders for OT eval and treat and up and OOB as tolerated   Performed at least 2 patient identifiers during session including name and wristband  PTA, pt reports being (I) with ADL/IADLs, lives with family in 2 story home with 6 ARLEN  (+) driving   Upon initial evaluation, patient is independent for UB ADLs, independent for LB ADLs, and supervision for transfers and functional mobility within room and bathroom with the use of with no AD  Based on functional eval, patient presents A&Ox4 with intact  attention  Occupational performance is not significantly impacted by any physical or cognitive deficits; no OT services warranted at this time  Pt encouraged to ambulate on nursing floor ad michael until D/C in order to maintain functional mobility and independent status    Recommending D/C to return to previous environment with social support once medically cleared  Will sign off, D/C OT  Please reconsult if further immediate skilled OT needs warranted  Goals   Patient Goals to return home soon, to be able to rest  No rehab goals at this time    Plan   OT Frequency Eval only   Recommendation   OT Discharge Recommendation No rehabilitation needs   OT - OK to Discharge Yes  (once medically clear)   Additional Comments  Pt resting in bed at end of session with all needs met, RN present at bedside    Additional Comments 2 The patient's raw score on the AM-PAC Daily Activity inpatient short form is 24, standardized score is 57 54, greater than 39 4  Patients at this level are likely to benefit from discharge to home  Please refer to the recommendation of the Occupational Therapist for safe discharge planning  AM-PAC Daily Activity Inpatient   Lower Body Dressing 4   Bathing 4   Toileting 4   Upper Body Dressing 4   Grooming 4   Eating 4   Daily Activity Raw Score 24   Daily Activity Standardized Score (Calc for Raw Score >=11) 57 54   AM-PAC Applied Cognition Inpatient   Following a Speech/Presentation 4   Understanding Ordinary Conversation 4   Taking Medications 4   Remembering Where Things Are Placed or Put Away 4   Remembering List of 4-5 Errands 4   Taking Care of Complicated Tasks 4   Applied Cognition Raw Score 24   Applied Cognition Standardized Score 62 21   Barthel Index   Feeding 10   Bathing 5   Grooming Score 5   Dressing Score 10   Bladder Score 10   Bowels Score 10   Toilet Use Score 10   Transfers (Bed/Chair) Score 10   Mobility (Level Surface) Score 0   Stairs Score 0  (DNT )   Barthel Index Score 70   Cecy Marcano, OT

## 2022-04-28 NOTE — ED PROCEDURE NOTE
PROCEDURE  ECG 12 Lead Documentation Only    Date/Time: 4/28/2022 12:40 AM  Performed by: Jonnie Rivers MD  Authorized by: Jonnie Rivers MD     Indications / Diagnosis:  Sob, left arm weak/numbness  ECG reviewed by me, the ED Provider: yes    Patient location:  ED  Previous ECG:     Comparison to cardiac monitor: Yes    Interpretation:     Interpretation: normal    Rate:     ECG rate:  61    ECG rate assessment: normal    Rhythm:     Rhythm: sinus rhythm    Ectopy:     Ectopy: none    QRS:     QRS axis:  Normal    QRS intervals:  Normal  Conduction:     Conduction: normal    ST segments:     ST segments:  Normal  T waves:     T waves: normal           Jonnie Rivers MD  04/28/22 4290

## 2022-04-28 NOTE — PLAN OF CARE
Problem: PHYSICAL THERAPY ADULT  Goal: Performs mobility at highest level of function for planned discharge setting  See evaluation for individualized goals  Description: Treatment/Interventions: Functional transfer training,Endurance training,Gait training,Compensatory technique education,Spoke to nursing          See flowsheet documentation for full assessment, interventions and recommendations  Note: Prognosis: Good  Problem List: Decreased endurance,Impaired balance,Decreased mobility,Decreased coordination  Assessment: Pt is 23 y o  female [facility] for PT evaluation [facility] w/ Stroke-like symptoms  PT consulted to assess pt's functional mobility and d/c needs  Order placed for PT eval and tx, w/ up and OOB as tolerated order  Comorbidities affecting pt's physical performance at time of assessment include:stroke-like symptoms, migraine, myopericarditis,anxiety d/o,asthma  PTA, pt was independent w/ all functional mobility w/ o AD usage  Personal factors affecting pt at time of IE include: inability to navigate community distances, unable to perform dynamic tasks in community and inability to perform IADLs  Please find objective findings from PT assessment regarding body systems outlined above with impairments and limitations including impaired balance, decreased endurance, impaired coordination, gait deviations, decreased activity tolerance, decreased functional mobility tolerance and fall risk  From PT/mobility standpoint, recommendation at time of d/c would be home with outpatient rehabilitation pending progress in order to facilitate return to PLOF  PT Discharge Recommendation: Home with outpatient rehabilitation          See flowsheet documentation for full assessment

## 2022-04-28 NOTE — ED PROVIDER NOTES
History  Chief Complaint   Patient presents with    Shortness of Breath     Pt reports SOB and chest tightness; pt also reports intermittent numbess and weakness of left arm over past few days       23YEAR-OLD FEMALE    PAST MEDICAL HISTORY:  Pericarditis, myocarditis last month   Heart catheterization:  No obstructive disease, patent foramen ovale  FAMILY HISTORY:  Strong hypercoagulable family history  SOCIAL HISTORY:  NONSMOKER, NO DRINKING, NO DRUGS      MODE OF TRANSPORT:   PRIVATE CAR, AMBULATORY       CHIEF COMPLAINT:  sob, left arm weak/numbness    Patient was admitted last month for martinez myocarditis  Found have patent foramen ovale on heart catheterization    Was cleared by Cardiology last week  Was told that everything was fine at this point  Was told that her myocarditis, pericarditis, was a result of COVID she had in January  Mom states patient had a full hypercoagulable workup which was normal    Patient remains on colchicine per Cardiology for several more weeks    She does not have chest pain or pressure at this time  INFECTIOUS SYMPTOMS:  PATIENT DENIES FEVERS, REPORTS CHILLS  OTHER ASSOCIATED SYMPTOMS:  NO ABDOMINAL PAIN  NO ACUTE BACK PAIN  NO NAUSEA OR VOMITING  NO STOOL CHANGES  DENIES HEADACHE OR DIZZINESS  NO NECK PAIN    SHE DENIES SYNCOPE OR NEAR SYNCOPE    SHE DENIES PALPITATIONS      INTERVENTIONS:   NONE      No other complaints      History provided by:  Patient  Shortness of Breath  Severity:  Moderate  Onset quality:  Gradual  Associated symptoms: no abdominal pain, no chest pain, no cough, no diaphoresis, no fever, no headaches, no neck pain, no rash, no vomiting and no wheezing    CVA/TIA-like Symptoms  Presenting symptoms: sensory loss (Left arm ) and weakness (Left arm)    Presenting symptoms: no change in level of consciousness, no confusion, no headaches, no disturbances in coordination, no language symptoms, no loss of balance and no visual change    Onset quality:  Sudden  Duration: on and off for several days  Progression:  Improving  Associated symptoms: no chest pain, no dizziness, no fever, no nausea, no neck pain and no vomiting        Prior to Admission Medications   Prescriptions Last Dose Informant Patient Reported? Taking? albuterol (ProAir HFA) 90 mcg/act inhaler  Self No No   Sig: Inhale 2 puffs every 6 (six) hours as needed (exercise induced asthma)   colchicine (COLCRYS) 0 6 mg tablet 4/27/2022 at Unknown time Self No Yes   Sig: Take 1 tablet (0 6 mg total) by mouth 2 (two) times a day   topiramate (TOPAMAX) 100 mg tablet 4/27/2022 at Unknown time Self Yes Yes   Sig: Take 100 mg by mouth 2 (two) times a day   topiramate (TOPAMAX) 50 MG tablet 4/27/2022 at Unknown time Self Yes Yes   Sig: Take 50 mg by mouth 2 (two) times a day      Facility-Administered Medications: None       Past Medical History:   Diagnosis Date    Allergies     Asthma     Migraine        Past Surgical History:   Procedure Laterality Date    CARDIAC CATHETERIZATION N/A 3/24/2022    Procedure: Cardiac catheterization;  Surgeon: Lam Rodrigues MD;  Location: [facility]; Service: Cardiology    CARDIAC CATHETERIZATION N/A 3/24/2022    Procedure: Cardiac Coronary Angiogram;  Surgeon: Lam Rodrigues MD;  Location: [facility]; Service: Cardiology       History reviewed  No pertinent family history  I have reviewed and agree with the history as documented  E-Cigarette/Vaping    E-Cigarette Use Never User      E-Cigarette/Vaping Substances     Social History     Tobacco Use    Smoking status: Never Smoker    Smokeless tobacco: Never Used   Vaping Use    Vaping Use: Never used   Substance Use Topics    Alcohol use: Yes    Drug use: No       Review of Systems   Constitutional: Negative for chills, diaphoresis, fatigue and fever  Respiratory: Positive for shortness of breath  Negative for cough, wheezing and stridor      Cardiovascular: Negative for chest pain, palpitations and leg swelling  Gastrointestinal: Negative for abdominal pain, blood in stool, nausea and vomiting  Genitourinary: Negative for difficulty urinating, dysuria, flank pain and frequency  Musculoskeletal: Negative for arthralgias, back pain, gait problem, joint swelling, myalgias, neck pain and neck stiffness  Skin: Negative for rash and wound  Neurological: Positive for weakness (Left arm) and numbness (Left arm)  Negative for dizziness, light-headedness, headaches, disturbances in coordination and loss of balance  Left arm weakness and numbness, coming and going over last 2 days   Psychiatric/Behavioral: Negative for confusion  All other systems reviewed and are negative  Physical Exam  Physical Exam  Constitutional:       General: She is not in acute distress  Appearance: She is well-developed  She is not ill-appearing, toxic-appearing or diaphoretic  HENT:      Head: Normocephalic and atraumatic  Nose: Nose normal       Mouth/Throat:      Pharynx: No oropharyngeal exudate  Eyes:      General: No scleral icterus  Right eye: No discharge  Left eye: No discharge  Conjunctiva/sclera: Conjunctivae normal       Pupils: Pupils are equal, round, and reactive to light  Neck:      Vascular: No JVD  Trachea: No tracheal deviation  Cardiovascular:      Rate and Rhythm: Normal rate and regular rhythm  Heart sounds: Normal heart sounds  No murmur heard  No friction rub  No gallop  Pulmonary:      Effort: Pulmonary effort is normal  No accessory muscle usage or respiratory distress  Breath sounds: Normal breath sounds  No stridor  No wheezing, rhonchi or rales  Chest:      Chest wall: No tenderness  Abdominal:      General: Bowel sounds are normal  There is no distension  Palpations: Abdomen is soft  There is no mass  Tenderness: There is no abdominal tenderness  There is no guarding or rebound        Hernia: No hernia is present  Musculoskeletal:         General: No tenderness or deformity  Normal range of motion  Cervical back: Normal range of motion and neck supple  Right lower leg: No tenderness  No edema  Left lower leg: No tenderness  No edema  Lymphadenopathy:      Cervical: No cervical adenopathy  Skin:     General: Skin is warm  Capillary Refill: Capillary refill takes less than 2 seconds  Coloration: Skin is not cyanotic or pale  Findings: No ecchymosis, erythema or rash  Nails: There is no clubbing  Neurological:      General: No focal deficit present  Mental Status: She is alert and oriented to person, place, and time  Cranial Nerves: No cranial nerve deficit  Sensory: No sensory deficit  Motor: No abnormal muscle tone  Coordination: Coordination normal    Psychiatric:         Mood and Affect: Mood is anxious  Behavior: Behavior normal          Thought Content:  Thought content normal          Judgment: Judgment normal          Vital Signs  ED Triage Vitals [04/28/22 0028]   Temperature Pulse Respirations Blood Pressure SpO2   (!) 97 3 °F (36 3 °C) 63 18 120/70 100 %      Temp Source Heart Rate Source Patient Position - Orthostatic VS BP Location FiO2 (%)   Temporal Monitor Sitting Left arm --      Pain Score       No Pain           Vitals:    04/28/22 0028   BP: 120/70   Pulse: 63   Patient Position - Orthostatic VS: Sitting         Visual Acuity      ED Medications  Medications   sodium chloride 0 9 % bolus 1,000 mL (1,000 mL Intravenous New Bag 4/28/22 0049)       Diagnostic Studies  Results Reviewed     Procedure Component Value Units Date/Time    hCG, qualitative pregnancy [[id number]]  (Normal) Collected: 04/28/22 0046    Lab Status: Final result Specimen: Blood from Arm, Right Updated: 04/28/22 0128     Preg, Serum Negative    HS Troponin I 2hr [[id number]]     Lab Status: No result Specimen: Blood     HS Troponin 0hr (reflex protocol) [[id number]]  (Normal) Collected: 04/28/22 0046    Lab Status: Final result Specimen: Blood from Arm, Right Updated: 04/28/22 0128     hs TnI 0hr <2 ng/L     B-Type Natriuretic Peptide(BNP) CA, GH, EA Campuses Only [[id number]]  (Normal) Collected: 04/28/22 0045    Lab Status: Final result Specimen: Blood from Arm, Right Updated: 04/28/22 0127     BNP 16 pg/mL     CMP [[id number]]  (Abnormal) Collected: 04/28/22 0046    Lab Status: Final result Specimen: Blood from Arm, Right Updated: 04/28/22 0120     Sodium 137 mmol/L      Potassium 3 5 mmol/L      Chloride 106 mmol/L      CO2 23 mmol/L      ANION GAP 8 mmol/L      BUN 20 mg/dL      Creatinine 1 03 mg/dL      Glucose 87 mg/dL      Calcium 9 5 mg/dL      AST 14 U/L      ALT 10 U/L      Alkaline Phosphatase 64 U/L      Total Protein 7 0 g/dL      Albumin 4 4 g/dL      Total Bilirubin 0 19 mg/dL      eGFR 78 ml/min/1 73sq m     Narrative:      [place] guidelines for Chronic Kidney Disease (CKD):     Stage 1 with normal or high GFR (GFR > 90 mL/min/1 73 square meters)    Stage 2 Mild CKD (GFR = 60-89 mL/min/1 73 square meters)    Stage 3A Moderate CKD (GFR = 45-59 mL/min/1 73 square meters)    Stage 3B Moderate CKD (GFR = 30-44 mL/min/1 73 square meters)    Stage 4 Severe CKD (GFR = 15-29 mL/min/1 73 square meters)    Stage 5 End Stage CKD (GFR <15 mL/min/1 73 square meters)  Note: GFR calculation is accurate only with a steady state creatinine    Lipase [[id number]]  (Normal) Collected: 04/28/22 0046    Lab Status: Final result Specimen: Blood from Arm, Right Updated: 04/28/22 0120     Lipase 21 u/L     Magnesium [[id number]]  (Normal) Collected: 04/28/22 0046    Lab Status: Final result Specimen: Blood from Arm, Right Updated: 04/28/22 0120     Magnesium 2 0 mg/dL     Lactic acid [[id number]]  (Normal) Collected: 04/28/22 0046    Lab Status: Final result Specimen: Blood from Arm, Right Updated: 04/28/22 0120     LACTIC ACID 0 6 mmol/L     Narrative:      Result may be elevated if tourniquet was used during collection  CK Total with Reflex CKMB [[id number]]  (Normal) Collected: 04/28/22 0046    Lab Status: Final result Specimen: Blood from Arm, Right Updated: 04/28/22 0120     Total CK 49 U/L     CBC and differential [[id number]]  (Abnormal) Collected: 04/28/22 0045    Lab Status: Final result Specimen: Blood from Arm, Right Updated: 04/28/22 0059     WBC 8 83 Thousand/uL      RBC 4 43 Million/uL      Hemoglobin 13 9 g/dL      Hematocrit 41 2 %      MCV 93 fL      MCH 31 4 pg      MCHC 33 7 g/dL      RDW 11 4 %      MPV 10 7 fL      Platelets 661 Thousands/uL      nRBC 0 /100 WBCs      Neutrophils Relative 52 %      Immat GRANS % 0 %      Lymphocytes Relative 38 %      Monocytes Relative 7 %      Eosinophils Relative 2 %      Basophils Relative 1 %      Neutrophils Absolute 4 63 Thousands/µL      Immature Grans Absolute 0 02 Thousand/uL      Lymphocytes Absolute 3 39 Thousands/µL      Monocytes Absolute 0 61 Thousand/µL      Eosinophils Absolute 0 13 Thousand/µL      Basophils Absolute 0 05 Thousands/µL                  CTA ED chest PE Study    (Results Pending)   CTA head and neck with and without contrast    (Results Pending)              Procedures  Procedures         ED Course  ED Course as of 04/28/22 0530   Thu Apr 28, 2022   0026 Patient seen and evaluated  She is here for intermittent left arm weakness and numbness  She also is having ongoing and worsening shortness of breath and chest discomfort  Recent diagnosis of martinez myocarditis and a patent foramen ovale   0027 Dw Neuro, Dr Rolando Nino, on for stroke  I reviewed the case   Patient does not have any stroke symptoms presently, though she has had waxing waning left arm weakness and numbness  Patient will undergo stroke protocol workup  Dr Tyesha Muller agreeable    5077 Patient stable    NIH score 0   0137 CMP(!)   0137 Total CK: 49   0137 LACTIC ACID: 0 6   0137 BNP: 16   0137 PREGNANCY, SERUM: Negative   0137 Magnesium: 2 0   0137 CBC and differential(!)   0137 Lipase: 21   0137 hs TnI 0hr: <2   0245 PT Stable  Comfortable  I reviewed the results w/ pt and mom at bedside    0304 CTA - CHEST     PULMONARY ARTERIAL TREE:  The present study is limited by motion artifact, streak artifact (largely related to the patient's arms, which were at her sides for the study), and the timing of contrast administration  The contrast material within the main   pulmonary artery measures 300 Hounsfield units whereas the ascending thoracic aorta measures 555 Hounsfield units  Within these limitations, there is no evidence of large central pulmonary embolism  Evaluation of the segmental and subsegmental branches   is limited      LUNGS:  Minimal dependent changes are present  There is no focal consolidation  There is no pneumothorax      PLEURA:  Unremarkable      HEART/GREAT VESSELS:  No pericardial effusion  No thoracic aortic aneurysm      MEDIASTINUM AND MIKEY:  Residual thymic tissue in the anterior mediastinum, similar to the prior study      CHEST WALL AND LOWER NECK: There is a 6 mm nodule within the posterior aspect of the right lobe of the thyroid  Incidental discovery of one or more thyroid nodule(s) measuring less than 1 cm and without suspicious features is noted in this patient who   is younger than 28years old; according to guidelines published in the February 2015 white paper on incidental thyroid nodules in the Journal of the [employer] of Radiology [facility]), no further evaluation is recommended       VISUALIZED STRUCTURES IN THE UPPER ABDOMEN:  Unremarkable      OSSEOUS STRUCTURES:  No acute fracture or destructive osseous lesion      IMPRESSION:     Extremely technically limited study  No evidence of large central pulmonary embolism  Evaluation of the segmental and subsegmental branches is limited  Clinical correlation is necessary    If there is persistent concern for pulmonary embolism, repeat   imaging or nuclear medicine ventilation/perfusion scanning could be performed, if there are no contraindications      There is a 6 mm nodule within the right lobe of the thyroid  Please see above discussion/recommendations/guidelines  0321 CTA head/neck:     IMPRESSION:     No evidence of acute intracranial abnormality      No large vessel flow restrictive disease within the head or neck      There is a 6 mm nodule within the right lobe of the thyroid  Please see above discussion /recommendations/guidelines       0326 Dw Dr Kathalene Goldmann that pt will need to complete her stroke work up, based on pt's RF  Can give FD asa     0329 Dw Vaishnavi Peters  Will obs to Dr Larry Padilla      Most Recent Value   Heart Score Risk Calculator    History 0 Filed at: 04/28/2022 0042   ECG 0 Filed at: 04/28/2022 0042   Age 0 Filed at: 04/28/2022 0042   Risk Factors 0 Filed at: 04/28/2022 0042   Troponin 0 Filed at: 04/28/2022 0042   HEART Score 0 Filed at: 04/28/2022 0042           Stroke Assessment     Row Name 04/28/22 0041             NIH Stroke Scale    Interval Baseline      Level of Consciousness (1a ) 0      LOC Questions (1b ) 0      LOC Commands (1c ) 0      Best Gaze (2 ) 0      Visual (3 ) 0      Facial Palsy (4 ) 0      Motor Arm, Left (5a ) 0      Motor Arm, Right (5b ) 0      Motor Leg, Left (6a ) 0      Motor Leg, Right (6b ) 0      Limb Ataxia (7 ) 0      Sensory (8 ) 0      Best Language (9 ) 0      Dysarthria (10 ) 0      Extinction and Inattention (11 ) (Formerly Neglect) 0      Total 0                Most Recent Value   TPA Decision Options    TPA Decision Patient not a TPA candidate  Patient is not a candidate options Symptoms resolved/clearly non disabling                                    MDM    Disposition  Final diagnoses:   None     ED Disposition     None      Follow-up Information    None         Patient's Medications   Discharge Prescriptions    No medications on file No discharge procedures on file      PDMP Review     None          ED Provider  Electronically Signed by           Evelina Porter MD  04/28/22 7609

## 2022-04-28 NOTE — ASSESSMENT & PLAN NOTE
· Patient with several days of on and off left arm weakness/numbness and has known patent foramen ovale  · ED reviewed w/ Neurology recommended admit for stroke work up  · MRI Brain  · Echo 3/25: EF 55%  Systolic function normal   Wall motion normal   Diastolic function normal   Eight atrial septum with evidence of left to right shunting by color flow Doppler consistent with small moderate-sized patent foramen ovale    Mitral and tricuspid valve with trace regurg

## 2022-04-28 NOTE — PHYSICAL THERAPY NOTE
Physical Therapy Evaluation     Patient's Name: So Candelario    Admitting Diagnosis  Shortness of breath [R06 02]    Problem List  Patient Active Problem List   Diagnosis    Migraine without aura and without status migrainosus, not intractable    Chronic daily headache    Anxiety disorder    Asthma    Myopericarditis    Stroke-like symptoms       Past Medical History  Past Medical History:   Diagnosis Date    Allergies     Asthma     Migraine        Past Surgical History  Past Surgical History:   Procedure Laterality Date    CARDIAC CATHETERIZATION N/A 3/24/2022    Procedure: Cardiac catheterization;  Surgeon: Cynthia Jeter MD;  Location: [facility]; Service: Cardiology    CARDIAC CATHETERIZATION N/A 3/24/2022        04/28/22 0820   PT Last Visit   PT Visit Date 04/28/22   Note Type   Note type Evaluation   Pain Assessment   Pain Assessment Tool 0-10   Pain Score No Pain  (denies)   Restrictions/Precautions   Weight Bearing Precautions Per Order No   Other Precautions Multiple lines;Telemetry   Home Living   Type of [address] [place] [address] Two level;Bed/bath upstairs  (6 ARLEN)   Bathroom Shower/Tub Walk-in shower   Bathroom Toilet Standard   Bathroom Accessibility [address]   (no prior AD usage)   Prior Function   Level of [place] Independent with ADLs and functional mobility   Lives With Family   ADL Assistance Independent   IADLs Independent   Falls in the last 6 months 0   Vocational Other (Comment)  ("no right now")   General   Additional Pertinent History Alisha OT present for co-assessment due to medical complexity, initial trialing of WB tasks     Family/Caregiver Present No   Cognition   Overall Cognitive Status WFL   Arousal/Participation Alert   Attention Within functional limits   Orientation Level Oriented X4   Memory Within functional limits   Following Commands Follows all commands and directions without difficulty   Comments Pt  agreeable to PT assessment, pleasant  RLE Assessment   RLE Assessment WFL   LLE Assessment   LLE Assessment WFL   Vision-Basic Assessment   Current Vision Wears contacts   Patient Visual Report Other (Comment)  ("when I stand up, my vision gets blurry')   Vestibular   Spontaneous Nystagmus (-) no evidence of nystagmus at rest in room light   Coordination   Movements are Fluid and Coordinated 0   Coordination and Movement Description Incremental mobility w/reported lightheadedness with transitional movements  Increased time provided for symptomatic resolution  Sharp/Dull   RLE Sharp/Dull Grossly intact   LLE Sharp/Dull Grossly intact  (pt  reports LUE symptoms have resolved)   Bed Mobility   Supine to Sit 6  Modified independent   Additional items Bedrails   Sit to Supine 6  Modified independent   Additional items Bedrails   Additional Comments Pt  reported lightheadedness w/positional changes  Increased time provided for symptomatic resolution  Transfers   Sit to Stand 5  Supervision   Additional items Assist x 1   Stand to Sit 5  Supervision   Additional items Assist x 1   Toilet transfer 5  Supervision   Additional items Assist x 1;Verbal cues   Additional Comments Upon initial stand, pt  demonstrated 1 ML LOB episode w/self correction, close supervision of PT  Ambulation/Elevation   Gait pattern Improper Weight shift; Inconsistent caity   Gait Assistance 5  Supervision   Additional items Assist x 1;Verbal cues   Assistive Device None   Distance 30 feet x 2   Ambulation/Elevation Additional Comments Verbal cues for environmental awareness     Balance   Static Sitting Normal   Dynamic Sitting Normal   Static Standing Good   Dynamic Standing Fair +   Ambulatory Fair +   Endurance Deficit   Endurance Deficit Yes   Activity Tolerance   Activity Tolerance Patient limited by fatigue;Treatment limited secondary to medical complications (Comment)  (lightheadedness)   Nurse Made Aware Yes, nursing staff was informed of assessment outcome  Assessment   Prognosis Good   Problem List Decreased endurance; Impaired balance;Decreased mobility; Decreased coordination   Assessment Pt is 23 y o  female seen for PT evaluation s/p admit to [facility] on 4/28/2022 w/ Stroke-like symptoms  PT consulted to assess pt's functional mobility and d/c needs  Order placed for PT eval and tx, w/ up and OOB as tolerated order  Comorbidities affecting pt's physical performance at time of assessment include:stroke-like symptoms, migraine, myopericarditis,anxiety d/o,asthma  PTA, pt was independent w/ all functional mobility w/ o AD usage  Personal factors affecting pt at time of IE include: inability to navigate community distances, unable to perform dynamic tasks in community and inability to perform IADLs  Please find objective findings from PT assessment regarding body systems outlined above with impairments and limitations including impaired balance, decreased endurance, impaired coordination, gait deviations, decreased activity tolerance, decreased functional mobility tolerance and fall risk  From PT/mobility standpoint, recommendation at time of d/c would be home with outpatient rehabilitation pending progress in order to facilitate return to PLOF  Goals   Patient Goals to get some rest   LTG Expiration Date 05/08/22   Long Term Goal #1 Patient will complete transfers modified I  to decrease risk of falls, facilitate upright standing posture  Patient will exhibit increase dynamic standing balance to Good 5-7 minutes without LOB independently to improve endurance  Patient will exhibit increase dynamic ambulatory balance to Good 350-500 feet w/o AD independently to improve ability to mobilize to toilet, chair and decrease risk for additional medical complications  Patient will exhibit good self monitoring and ability to follow 2 step commands to increase complexity of tasks and resume ADL's without LOB     PT Treatment Day 0   Plan Treatment/Interventions Functional transfer training; Endurance training;Gait training; Compensatory technique education;Spoke to nursing   PT Frequency 2-3x/wk   Recommendation   PT Discharge Recommendation Home with outpatient rehabilitation   Additional Comments Upon conclusion, pt  was resting in bed w/all needs within reach,nursing staff at bedside  Additional Comments 2 Pt's raw score on the [facility] Basic Mobility inpatient short form is 21, standardized score is 45 55  Patients at this level are likely to benefit from DC to home  However, please refer to therapist recommendation for safe DC planning     [facility] Basic Mobility Inpatient   Turning in Bed Without Bedrails 4   Lying on Back to Sitting on Edge of Flat Bed 4   Moving Bed to Chair 4   Standing Up From Chair 3   Walk in Room 3   Climb 3-5 Stairs 3   Basic Mobility Inpatient Raw Score 21   Basic Mobility Standardized Score 45 55   Highest Level Of Mobility   JH-HLM Goal 6: Walk 10 steps or more   JH-HLM Highest Level of Mobility 7: Walk 25 feet or more   JH-HLM Goal Achieved Yes     History/Personal Factors/Comorbidities: stroke-like symptoms, migraine, myopericarditis,anxiety d/o,asthma    # of body structures/limitations: activity intolerance,decreased endurance, impaired balance, gait deviations    Clinical presentation: unstable as seen in progressive symptoms prior to hospitalization, LOB with WB transitional movement,persistent lightheadedness, low BP    Initial Assessment Time: [id number]    Annalisa Cabrera, PT

## 2022-04-28 NOTE — ED NOTES
Pt's mother at bedside; pt and mother with no needs at this time; lights turned off in room per pt request; will continue to monitor     Jose Alberto Anne RN  04/28/22 8846

## 2022-04-28 NOTE — H&P
[address] 2000, 23 y o  female MRN: [id number]  Unit/Bed#: ED 26 Encounter: [id number]  Primary Care Provider: Nadya Templeton MD   Date and time admitted to hospital: 4/28/2022 12:24 AM    * Stroke-like symptoms  Assessment & Plan  · Patient with several days of on and off left arm weakness/numbness and has known patent foramen ovale  · ED reviewed w/ Neurology recommended admit for stroke work up  · MRI Brain  · Echo 3/25: EF 55%  Systolic function normal   Wall motion normal   Diastolic function normal   Eight atrial septum with evidence of left to right shunting by color flow Doppler consistent with small moderate-sized patent foramen ovale  Mitral and tricuspid valve with trace regurg    Myopericarditis  Assessment & Plan  · Diagnosed March 23rd, suspected secondary to [place]  · Patient on colchicine for 3 months total  · Continue follow-up with Cardiology  · No strenuous activity as directed    Migraine without aura and without status migrainosus, not intractable  Assessment & Plan  · Continue Topamax    VTE Pharmacologic Prophylaxis: VTE Score: 9 High Risk (Score >/= 5) - Pharmacological DVT Prophylaxis Ordered: enoxaparin (Lovenox)  Sequential Compression Devices Ordered  Code Status: Full Code  Discussion with patient    Anticipated Length of Stay: Patient will be admitted on an observation basis with an anticipated length of stay of less than 2 midnights secondary to Stroke workup  Chief Complaint:  Shortness of breath, Left arm weakness and numbness on and off    History of Present Illness:  Bart Love is a 23 y o  female with a PMH of exercise-induced asthma, migraines, recently diagnosed with myopericarditis with PFO who presents with on and off left arm weakness and numbness  Notes SOB since she left the hospital but Wednesday was worse with activity, felt she could not walk down steps   Patient reporting intermittent numbness and weakness of her left arm for about a week  Symptoms come and go and last a few minutes with weakness in arm, no weakness in leg, no difficulty with words, facial droop numbness on face  She was recently diagnosed with myopericarditis end of March and is on colchicine twice a day during the workup she was found have a PFO and has a hypercoagulable Family history  Noted some chest pain yesterday, lasted few minutes and then would come back  No pain currently    ED treatment: asa 324mg benadryl 50mg    Review of Systems:  Review of Systems   Constitutional: Negative for chills and fever  HENT: Positive for sore throat  Negative for rhinorrhea and trouble swallowing  Eyes: Negative for discharge and redness  Respiratory: Positive for shortness of breath  Negative for cough and wheezing  Cardiovascular: Positive for chest pain  Gastrointestinal: Positive for vomiting  Negative for abdominal pain, diarrhea and nausea  Genitourinary: Negative for dysuria and hematuria  Musculoskeletal: Negative for back pain and neck pain  Skin: Negative for rash and wound  Neurological: Positive for dizziness, weakness, numbness and headaches  Psychiatric/Behavioral: Negative for agitation and confusion  Past Medical and Surgical History:   Past Medical History:   Diagnosis Date    Allergies     Asthma     Migraine        Past Surgical History:   Procedure Laterality Date    CARDIAC CATHETERIZATION N/A 3/24/2022    Procedure: Cardiac catheterization;  Surgeon: Giovany Serrano MD;  Location: [facility]; Service: Cardiology    CARDIAC CATHETERIZATION N/A 3/24/2022    Procedure: Cardiac Coronary Angiogram;  Surgeon: Giovany Serrano MD;  Location: [facility]; Service: Cardiology       Meds/Allergies:  Prior to Admission medications    Medication Sig Start Date End Date Taking?  Authorizing Provider   colchicine (COLCRYS) 0 6 mg tablet Take 1 tablet (0 6 mg total) by mouth 2 (two) times a day 3/25/22 6/23/22 Yes Danette Lee MD   topiramate (TOPAMAX) 100 mg tablet Take 100 mg by mouth 2 (two) times a day 9/13/21  Yes Historical Provider, MD   topiramate (TOPAMAX) 50 MG tablet Take 50 mg by mouth 2 (two) times a day 3/5/22  Yes Historical Provider, MD   albuterol (ProAir HFA) 90 mcg/act inhaler Inhale 2 puffs every 6 (six) hours as needed (exercise induced asthma) 9/15/21   Raysa Licona PA-C     I have reviewed home medications with patient personally  Allergies: No Known Allergies    Social History:  Marital Status: Single   Occupation: Student  Patient Pre-hospital Living Situation: Home  Patient Pre-hospital Level of Mobility: walks  Patient Pre-hospital Diet Restrictions: None  Substance Use History:   Social History     Substance and Sexual Activity   Alcohol Use Yes     Social History     Tobacco Use   Smoking Status Never Smoker   Smokeless Tobacco Never Used     Social History     Substance and Sexual Activity   Drug Use No       Family History:  History reviewed  No pertinent family history  Physical Exam:     Vitals:   Blood Pressure: 107/73 (04/28/22 0330)  Pulse: 59 (04/28/22 0330)  Temperature: (!) 97 3 °F (36 3 °C) (04/28/22 0028)  Temp Source: Temporal (04/28/22 0028)  Respirations: 18 (04/28/22 0300)  Height: 5' 8" (172 7 cm) (04/28/22 0028)  Weight - Scale: 76 2 kg (168 lb) (04/28/22 0028)  SpO2: 99 % (04/28/22 0330)    Physical Exam  Vitals reviewed  Constitutional:       Appearance: Normal appearance  HENT:      Head: Normocephalic and atraumatic  Nose: Nose normal    Eyes:      General:         Right eye: No discharge  Left eye: No discharge  Extraocular Movements: Extraocular movements intact  Conjunctiva/sclera: Conjunctivae normal    Cardiovascular:      Rate and Rhythm: Normal rate and regular rhythm  Pulmonary:      Effort: Pulmonary effort is normal  No respiratory distress  Breath sounds: Normal breath sounds  No wheezing  Abdominal:      General: Bowel sounds are normal  There is no distension  Palpations: Abdomen is soft  Tenderness: There is no abdominal tenderness  There is no guarding  Musculoskeletal:         General: No swelling or tenderness  Normal range of motion  Cervical back: Normal range of motion  Right lower leg: No edema  Left lower leg: No edema  Skin:     General: Skin is warm and dry  Capillary Refill: Capillary refill takes less than 2 seconds  Neurological:      General: No focal deficit present  Mental Status: She is alert and oriented to person, place, and time  Mental status is at baseline  GCS: GCS eye subscore is 4  GCS verbal subscore is 5  GCS motor subscore is 6  Cranial Nerves: Cranial nerves are intact  No dysarthria or facial asymmetry  Sensory: Sensation is intact  Motor: No weakness or pronator drift  Coordination: Coordination normal  Heel to Shin Test normal  Rapid alternating movements normal    Psychiatric:         Mood and Affect: Mood normal          Behavior: Behavior normal          Thought Content:  Thought content normal          Judgment: Judgment normal           Additional Data:     Lab Results:  Results from last 7 days   Lab Units 04/28/22  0045   WBC Thousand/uL 8 83   HEMOGLOBIN g/dL 13 9   HEMATOCRIT % 41 2   PLATELETS Thousands/uL 259   NEUTROS PCT % 52   LYMPHS PCT % 38   MONOS PCT % 7   EOS PCT % 2     Results from last 7 days   Lab Units 04/28/22  0046   SODIUM mmol/L 137   POTASSIUM mmol/L 3 5   CHLORIDE mmol/L 106   CO2 mmol/L 23   BUN mg/dL 20   CREATININE mg/dL 1 03   ANION GAP mmol/L 8   CALCIUM mg/dL 9 5   ALBUMIN g/dL 4 4   TOTAL BILIRUBIN mg/dL 0 19*   ALK PHOS U/L 64   ALT U/L 10   AST U/L 14   GLUCOSE RANDOM mg/dL 87                 Results from last 7 days   Lab Units 04/28/22  0046   LACTIC ACID mmol/L 0 6       Imaging: Reviewed radiology reports from this admission including: CT head  CTA ED chest PE Study   Final Result by Kodak Yi MD (04/28 2274)      Extremely technically limited study  No evidence of large central pulmonary embolism  Evaluation of the segmental and subsegmental branches is limited  Clinical correlation is necessary  If there is persistent concern for pulmonary embolism, repeat    imaging or nuclear medicine ventilation/perfusion scanning could be performed, if there are no contraindications  There is a 6 mm nodule within the right lobe of the thyroid  Please see above discussion/recommendations/guidelines  Workstation performed: [id number]         CTA head and neck with and without contrast   Final Result by Kodak Yi MD (04/28 2248)      No evidence of acute intracranial abnormality  No large vessel flow restrictive disease within the head or neck  There is a 6 mm nodule within the right lobe of the thyroid  Please see above discussion /recommendations/guidelines  The study was marked in [facility] for immediate notification  Workstation performed: [id number]             ** Please Note: This note has been constructed using a voice recognition system   **

## 2022-04-28 NOTE — ASSESSMENT & PLAN NOTE
· Diagnosed March 23rd, suspected secondary to [place]  · Patient on colchicine for 3 months total  · Continue follow-up with Cardiology  · No strenuous activity as directed

## 2022-04-29 VITALS
WEIGHT: 169.31 LBS | HEIGHT: 68 IN | DIASTOLIC BLOOD PRESSURE: 66 MMHG | HEART RATE: 67 BPM | TEMPERATURE: 98.3 F | RESPIRATION RATE: 20 BRPM | SYSTOLIC BLOOD PRESSURE: 102 MMHG | BODY MASS INDEX: 25.66 KG/M2 | OXYGEN SATURATION: 99 %

## 2022-04-29 LAB
ANION GAP SERPL CALCULATED.3IONS-SCNC: 7 MMOL/L (ref 4–13)
BUN SERPL-MCNC: 19 MG/DL (ref 5–25)
CALCIUM SERPL-MCNC: 9.2 MG/DL (ref 8.4–10.2)
CHLORIDE SERPL-SCNC: 109 MMOL/L (ref 96–108)
CO2 SERPL-SCNC: 23 MMOL/L (ref 21–32)
CREAT SERPL-MCNC: 1.01 MG/DL (ref 0.6–1.3)
ERYTHROCYTE [DISTWIDTH] IN BLOOD BY AUTOMATED COUNT: 11.6 % (ref 11.6–15.1)
GFR SERPL CREATININE-BSD FRML MDRD: 80 ML/MIN/1.73SQ M
GLUCOSE SERPL-MCNC: 91 MG/DL (ref 65–140)
HCT VFR BLD AUTO: 41.9 % (ref 34.8–46.1)
HGB BLD-MCNC: 13.9 G/DL (ref 11.5–15.4)
MCH RBC QN AUTO: 31 PG (ref 26.8–34.3)
MCHC RBC AUTO-ENTMCNC: 33.2 G/DL (ref 31.4–37.4)
MCV RBC AUTO: 94 FL (ref 82–98)
PLATELET # BLD AUTO: 238 THOUSANDS/UL (ref 149–390)
PMV BLD AUTO: 10.8 FL (ref 8.9–12.7)
POTASSIUM SERPL-SCNC: 3.9 MMOL/L (ref 3.5–5.3)
RBC # BLD AUTO: 4.48 MILLION/UL (ref 3.81–5.12)
SODIUM SERPL-SCNC: 139 MMOL/L (ref 135–147)
WBC # BLD AUTO: 6.71 THOUSAND/UL (ref 4.31–10.16)

## 2022-04-29 PROCEDURE — 99232 SBSQ HOSP IP/OBS MODERATE 35: CPT | Performed by: INTERNAL MEDICINE

## 2022-04-29 PROCEDURE — 80048 BASIC METABOLIC PNL TOTAL CA: CPT | Performed by: INTERNAL MEDICINE

## 2022-04-29 PROCEDURE — 85027 COMPLETE CBC AUTOMATED: CPT | Performed by: INTERNAL MEDICINE

## 2022-04-29 PROCEDURE — 99239 HOSP IP/OBS DSCHRG MGMT >30: CPT | Performed by: INTERNAL MEDICINE

## 2022-04-29 RX ORDER — COLCHICINE 0.6 MG/1
0.6 TABLET ORAL 2 TIMES DAILY
Qty: 60 TABLET | Refills: 0 | Status: SHIPPED | OUTPATIENT
Start: 2022-04-29 | End: 2022-06-23 | Stop reason: SDUPTHER

## 2022-04-29 RX ORDER — COLCHICINE 0.6 MG/1
0.6 TABLET ORAL DAILY
Qty: 60 TABLET | Refills: 0 | Status: SHIPPED | OUTPATIENT
Start: 2022-04-29 | End: 2022-04-29 | Stop reason: SDUPTHER

## 2022-04-29 RX ORDER — COLCHICINE 0.6 MG/1
0.6 TABLET ORAL DAILY
Status: DISCONTINUED | OUTPATIENT
Start: 2022-04-29 | End: 2022-04-29 | Stop reason: HOSPADM

## 2022-04-29 RX ADMIN — COLCHICINE 0.6 MG: 0.6 TABLET, FILM COATED ORAL at 09:08

## 2022-04-29 RX ADMIN — TOPIRAMATE 150 MG: 100 TABLET, FILM COATED ORAL at 09:07

## 2022-04-29 RX ADMIN — ENOXAPARIN SODIUM 40 MG: 100 INJECTION SUBCUTANEOUS at 09:07

## 2022-04-29 NOTE — ASSESSMENT & PLAN NOTE
· Presented with SOB that has now resoled  · Inflammatory markers are negative  · TTE (4/28):  LVEF 50-55%   Systolic function is low normal  Wall motion is normal  Diastolic function is normal    · Continue previously prescribed Colchicine and outpatient Cardiology follow-up

## 2022-04-29 NOTE — ASSESSMENT & PLAN NOTE
· Presented with complaint of L arm numbness that has now resolved; likely related to complex Migraine  · MRI Brain (4/48): Normal brain MRI   No acute infarct, as clinically questioned     · Continue home Topamax

## 2022-04-29 NOTE — ASSESSMENT & PLAN NOTE
Cardiac MRI showing Myocarditis in 3/2022  Patient on Colchicine therapy for three months-decrease to once daily  HS troponin levels have normalized   Repeat inflammatory markers are normal     Planned for exercise stress testing in June

## 2022-04-29 NOTE — TELEPHONE ENCOUNTER
Patient went to ER and was admitted overnight    She is scheduled for a stress echo in June and will make an appointment for her after the stress test

## 2022-04-29 NOTE — ASSESSMENT & PLAN NOTE
Cardiac MRI showing Myocarditis in 3/2022  Patient on Colchicine therapy for three months  HS troponin levels have normalized   Repeat inflammatory markers are normal     Planned for exercise stress testing in June

## 2022-04-29 NOTE — DISCHARGE INSTRUCTIONS
Acute Pericarditis   WHAT YOU NEED TO KNOW:   Acute pericarditis is inflammation of the pericardium  The pericardium is the thin sac that surrounds your heart  A small amount of clear fluid between the heart and the sac allows the heart to beat easily  With acute pericarditis, the amount of fluid increases and may contain pus  This can lead to problems with the way that your heart beats  DISCHARGE INSTRUCTIONS:   Seek care immediately if:   · You have shortness of breath that is worse when you lie down  · Your chest pain gets worse or does not get better  Call your doctor if:   · You have a fever  · You have questions or concerns about your condition or care  Medicines: You may need any of the following:  · NSAIDs  help decrease swelling and pain or fever  This medicine is available with or without a doctor's order  NSAIDs can cause stomach bleeding or kidney problems in certain people  If you take blood thinner medicine, always ask your healthcare provider if NSAIDs are safe for you  Always read the medicine label and follow directions  · Antibiotics  help prevent or treat a bacterial infection  · Steroid medicine  helps lower inflammation  · Take your medicine as directed  Contact your healthcare provider if you think your medicine is not helping or if you have side effects  Tell him of her if you are allergic to any medicine  Keep a list of the medicines, vitamins, and herbs you take  Include the amounts, and when and why you take them  Bring the list or the pill bottles to follow-up visits  Carry your medicine list with you in case of an emergency  Self-care:   · Eat a variety of healthy foods  This may help you have more energy and heal faster  Healthy foods include fruits, vegetables, whole-grain breads, low-fat dairy products, beans, lean meat, and fish  Ask if you need to be on a special diet  · Drink liquids as directed    Adults should drink between 9 and 13 eight-ounce cups of liquid every day  Ask what amount is best for you  For most people, good liquids to drink are water, juice, and milk  · Get plenty of exercise  Talk to your healthcare provider about the best exercise plan for you  Exercise can decrease your blood pressure and improve your health  · Do not smoke  Nicotine and other chemicals in cigarettes and cigars can cause lung damage  Ask your healthcare provider for information if you currently smoke and need help to quit  E-cigarettes or smokeless tobacco still contain nicotine  Talk to your healthcare provider before you use these products  · Manage stress  Stress may slow healing and cause illness  Learn new ways to relax, such as deep breathing  Prevent infections: The following can help prevent the spread of viruses and bacteria that can cause acute pericarditis or make it worse:  · Wash your hands often  Wash your hands several times each day  Wash after you use the bathroom, change a child's diaper, and before you prepare or eat food  Use soap and water every time  Rub your soapy hands together, lacing your fingers  Wash the front and back of your hands, and in between your fingers  Use the fingers of one hand to scrub under the fingernails of the other hand  Wash for at least 20 seconds  Rinse with warm, running water for several seconds  Then dry your hands with a clean towel or paper towel  Use hand  that contains alcohol if soap and water are not available  Do not touch your eyes, nose, or mouth without washing your hands first          · Cover a sneeze or cough  Use a tissue that covers your mouth and nose  Throw the tissue away in a trash can right away  Use the bend of your arm if a tissue is not available  Wash your hands well with soap and water or use a hand   · Clean surfaces often  Clean doorknobs, countertops, cell phones, and other surfaces that are touched often   Use a disinfecting wipe, a single-use sponge, or a cloth you can wash and reuse  Use disinfecting  if you do not have wipes  You can create a disinfecting  by mixing 1 part bleach with 10 parts water  · Ask about vaccines you may need  Vaccines help protect against viruses and bacteria that cause certain diseases  Your healthcare provider can tell you which vaccines you may need and when to get them  ? Get the influenza (flu) vaccine as soon as recommended each year  The flu vaccine is usually available starting in September or October  Flu viruses change, so it is important to get a flu vaccine every year  ? Get the pneumonia vaccine if recommended  This vaccine is usually recommended every 5 years  Your provider will tell you when to get this vaccine, if needed  Follow up with your doctor as directed:  Write down your questions so you remember to ask them during your visits  © Copyright [employer] 2022 Information is for End User's use only and may not be sold, redistributed or otherwise used for commercial purposes  All illustrations and images included in CareNotes® are the copyrighted property of A D A M , Inc  or [facility] Jenkins   The above information is an  only  It is not intended as medical advice for individual conditions or treatments  Talk to your doctor, nurse or pharmacist before following any medical regimen to see if it is safe and effective for you  Probenecid/Colchicine (By mouth)   Colchicine ([id number]-ofyi), Probenecid (jubu-TVA-c-sammi)  Treats gout and gouty arthritis  Brand Name(s):   There may be other brand names for this medicine  When This Medicine Should Not Be Used: This medicine is not right for everyone  Do not use it if you had an allergic reaction to probenecid or colchicine, or if you are pregnant  How to Use This Medicine:   Tablet  · Take your medicine as directed   Your dose may need to be changed several times to find what works best for you   · Drink 10 to 12 full glasses of liquids each day unless your doctor tells you differently  This will help to prevent kidney stones  · Carefully follow your doctor's instructions about any special diet  · Missed dose: Take a dose as soon as you remember  If it is almost time for your next dose, wait until then and take a regular dose  Do not take extra medicine to make up for a missed dose  · Store the medicine in a closed container at room temperature, away from heat, moisture, and direct light  Drugs and Foods to Avoid:   Ask your doctor or pharmacist before using any other medicine, including over-the-counter medicines, vitamins, and herbal products  · Some foods and medicines can affect how probenecid works  Tell your doctor if you also use any of the following:  ? Methotrexate  ? Salicylate medicine, such as aspirin, topical medicine to treat pain, acne medicine, or medicine to treat diarrhea, nausea, and vomiting  ? Penicillin, a sulfa drug, or a similar antibiotic  ? Oral medicine for diabetes  ? Sulindac  ? Acetaminophen, indomethacin, naproxen, ketoprofen, meclofenamate  ? Lorazepam  ? Rifampin  Warnings While Using This Medicine:   · It is not safe to take this medicine during pregnancy  It could harm an unborn baby  Tell your doctor right away if you become pregnant  · Tell your doctor if you are breastfeeding, or if you have kidney stones, a blood disorder, kidney disease, liver disease, or a history of stomach ulcers  · This medicine may cause the following problems:  ? Increased gout symptoms, when you first start treatment  ? Kidney stones  · Tell any doctor or dentist who treats you that you are using this medicine, especially if you are going to have general anesthesia  · Tell any doctor or dentist who treats you that you are using this medicine  This medicine may affect certain medical test results  · Keep all medicine out of the reach of children   Never share your medicine with anyone  Possible Side Effects While Using This Medicine:   Call your doctor right away if you notice any of these side effects:  · Allergic reaction: Itching or hives, swelling in your face or hands, swelling or tingling in your mouth or throat, chest tightness, trouble breathing  · Blood in the urine, pain when urinating, back pain  · Numbness or tingling in your hands or feet  · Severe stomach pain, vomiting, or diarrhea  If you notice these less serious side effects, talk with your doctor:   · Headache  · Dizziness  If you notice other side effects that you think are caused by this medicine, tell your doctor  Call your doctor for medical advice about side effects  You may report side effects to FDA at 7-078-FDA-7294    © Copyright [employer] 2022 Information is for End User's use only and may not be sold, redistributed or otherwise used for commercial purposes  The above information is an  only  It is not intended as medical advice for individual conditions or treatments  Talk to your doctor, nurse or pharmacist before following any medical regimen to see if it is safe and effective for you  Topiramate (By mouth)   Topiramate (toe-PIR-a-mate)  Treats and prevents seizures  Also prevents migraine headaches  Brand Name(s): Eprontia, Qudexy XR, Topamax, Trokendi XR   There may be other brand names for this medicine  When This Medicine Should Not Be Used: This medicine is not right for everyone  Do not use it if you had an allergic reaction to topiramate, or if you are pregnant  How to Use This Medicine:   Capsule, Long Acting Capsule, Liquid, Tablet  · Take your medicine as directed  Your dose may need to be changed several times to find what works best for you  · Capsule or extended-release capsule: Do not crush or chew the capsule  Swallow whole or open the capsule and sprinkle the contents into a small amount (1 teaspoon) of soft food (including applesauce)   Swallow the mixture right away without chewing  Do not save for later use  · Tablet: Swallow whole  Do not break, crush, or chew it  It has a very bitter taste  · Oral liquid: Measure the oral liquid medicine with a marked measuring spoon, oral syringe, or medicine cup  · Drink extra fluids so you will urinate more often and help prevent kidney problems  · This medicine should come with a Medication Guide  Ask your pharmacist for a copy if you do not have one  · Missed dose: Take a dose as soon as you remember  If it is almost time for your next dose, wait until then and take a regular dose  Do not take extra medicine to make up for a missed dose  If you miss a dose of Topamax®  or Eprontia and it is within 6 hours until your next regular dose, skip the missed dose and take your next dose at the regular time  If you miss more than 1 dose of Topamax® or Eprontia, call your doctor for instructions  · Store the medicine in a closed container at room temperature, away from heat, moisture, and direct light  Throw away any unused oral liquid 30 days after first opening  Drugs and Foods to Avoid:   Ask your doctor or pharmacist before using any other medicine, including over-the-counter medicines, vitamins, and herbal products  · Some medicines can affect how topiramate works  Tell your doctor if you are using any of the following:  ? Acetazolamide, amitriptyline, dichloralphenazone, dichlorphenamide, digoxin, hydrochlorothiazide, lithium, zonisamide  ? Birth control pills  ? Oral diabetes medicine (including metformin, pioglitazone)  ? Other medicines to treat seizures (including carbamazepine, phenytoin, valproic acid)  · Do not drink alcohol with Qudexy XR or Topamax®  Do not drink alcohol for 6 hours before and 6 hours after you take the Trokendi XR capsule  · Tell your doctor if you use anything else that makes you sleepy  Some examples are allergy medicine, narcotic pain medicine, and alcohol    Warnings While Using This Medicine:   · It is not safe to take this medicine during pregnancy  It could harm an unborn baby  Tell your doctor right away if you become pregnant  · Tell your doctor if you are breastfeeding, or if you have kidney disease, liver disease, glaucoma, lung or breathing problems, osteoporosis, or a history of depression or mood disorders  Tell your doctor if you are on a ketogenic diet (high in fat and low in carbohydrates)  · This medicine may cause the following problems:  ? Eye pain or vision changes, including glaucoma  ? Changes in body temperature  ? Metabolic acidosis (too much acid in the blood)  ? Changes in mood or behavior, including thoughts of suicide  ? Serious skin reactions, including Garcia-Anthony syndrome, toxic epidermal necrolysis  ? Kidney stones  · This medicine may make you dizzy, drowsy, or tired  Do not drive or do anything else that could be dangerous until you know how this medicine affects you  · Do not stop using this medicine suddenly  Your doctor will need to slowly decrease your dose before you stop it completely  · Your doctor will do lab tests at regular visits to check on the effects of this medicine  Keep all appointments  · Keep all medicine out of the reach of children  Never share your medicine with anyone    Possible Side Effects While Using This Medicine:   Call your doctor right away if you notice any of these side effects:  · Allergic reaction: Itching or hives, swelling in your face or hands, swelling or tingling in your mouth or throat, chest tightness, trouble breathing  · Blistering, peeling, red skin rash  · Bloody or cloudy urine, painful urination, sudden lower back or stomach pain  · Changes in vision, eye pain  · Confusion, problems with walking, clumsiness, dizziness, trouble talking, concentrating, or remembering  · Feeling agitated, depressed, nervous, or irritable, thoughts of hurting yourself or others, unusual mood or behavior  · Fever, decreased sweating  · Numbness, tingling, or burning pain in your hands, arms, legs, or feet  · Rapid, deep breathing, fast or uneven heartbeat  · Unusual drowsiness, tiredness, or weakness  If you notice these less serious side effects, talk with your doctor:   · Change in taste  · Diarrhea, nausea, vomiting  · Stuffy or runny nose  · Loss of appetite, weight loss  If you notice other side effects that you think are caused by this medicine, tell your doctor  Call your doctor for medical advice about side effects  You may report side effects to FDA at 7-055-FDA-0511    © Copyright [employer] 2022 Information is for End User's use only and may not be sold, redistributed or otherwise used for commercial purposes  The above information is an  only  It is not intended as medical advice for individual conditions or treatments  Talk to your doctor, nurse or pharmacist before following any medical regimen to see if it is safe and effective for you

## 2022-04-29 NOTE — DISCHARGE SUMMARY
[place]am 47  Discharge- [place] 2000, 23 y o  female MRN: [id number]  Unit/Bed#: -01 Encounter: [id number]  Primary Care Provider: Carmelita Davila MD   Date and time admitted to hospital: 4/28/2022 12:24 AM    * Stroke-like symptoms  Assessment & Plan  · Presented with complaint of L arm numbness that has now resolved; likely related to complex Migraine  · MRI Brain (4/48): Normal brain MRI   No acute infarct, as clinically questioned  · Continue home Topamax    Migraine without aura and without status migrainosus, not intractable  Assessment & Plan  · Continue Topamax 150mg BID    Myopericarditis  Assessment & Plan  · Presented with SOB that has now resoled  · Inflammatory markers are negative  · TTE (4/28):  LVEF 50-55%  Systolic function is low normal  Wall motion is normal  Diastolic function is normal    · Continue previously prescribed Colchicine and outpatient Cardiology follow-up    PFO (patent foramen ovale)  Assessment & Plan  · Continue outpatient follow-up      Medical Problems             Resolved Problems  Date Reviewed: 4/29/2022    None              Discharging Physician / Practitioner: Laurie Wilde DO  PCP: Carmelita Davila MD  Admission Date:   Admission Orders (From admission, onward)     Ordered        04/28/22 0956  Inpatient Admission  Once            04/28/22 0329  Place in Observation  Once                      Discharge Date: 04/29/22    Consultations During Hospital Stay:  · Cardiology    Procedures Performed:   · MRI Brain (4/48): Normal brain MRI   No acute infarct, as clinically questioned  · TTE (4/28):  LVEF 50-55%  Systolic function is low normal  Wall motion is normal  Diastolic function is normal      Significant Findings / Test Results:   · None    Incidental Findings:   · None     Test Results Pending at Discharge (will require follow up):    · None     Outpatient Tests Requested:  · To be determined in the outpatient setting upon follow-up with PCP an Cardiology     Complications:  None    Reason for Admission: L arm numbness and shortness of breath    Hospital Course:   Nely Hutson is a 23 y o  female patient who originally presented to the hospital on 4/28/2022 due to left arm numbness and shortness of breath  Please see H and P as documented by Armond Head for complete details regarding history of presenting illness  In brief, the patient has a history of PFO and recent diagnosis of acute myopericarditis  She has been on oral colchicine and following outpatient with Cardiology  Unfortunately, despite compliance with her home colchicine she has noted progressively worsening shortness of Breath and intermittent left arm numbness  She was admitted to the medical floor for stroke workup giving her history of PFO  During her admission MRI brain was performed and negative for acute infarct  Her symptoms were deemed likely due to complex migraine as she does have a prior history of migraines and has missed a few doses of her Topamax  Regarding her shortness of breath and updated echocardiogram was performed that did not show any acute inflammation of the pericardium  Additionally, inflammatory markers were checked and negative  She was ultimately discharged home with improvement in her symptoms and advised to continue her previously prescribed colchicine and follow-up outpatient with her cardiologist   All of her questions in her mother's questions were answered prior to their departure and they expressed understanding and agreement with the plan  This is a brief discharge summary please see full medical record for more details  Please see above list of diagnoses and related plan for additional information  Condition at Discharge: good    Discharge Day Visit / Exam:   Subjective:  Patient resting comfortably in bed without any acute complaints    No significant overnight events reported by nursing  Vitals: Blood Pressure: 102/66 (04/29/22 1105)  Pulse: 67 (04/29/22 1105)  Temperature: 98 3 °F (36 8 °C) (04/29/22 1105)  Temp Source: Oral (04/29/22 0900)  Respirations: 20 (04/29/22 0900)  Height: 5' 8" (172 7 cm) (04/28/22 1800)  Weight - Scale: 76 8 kg (169 lb 5 oz) (04/28/22 1800)  SpO2: 99 % (04/29/22 1105)     Exam:   Physical Exam  Vitals and nursing note reviewed  Constitutional:       General: She is not in acute distress  Appearance: She is well-developed  HENT:      Head: Normocephalic and atraumatic  Mouth/Throat:      Mouth: Mucous membranes are moist       Pharynx: Oropharynx is clear  Eyes:      Conjunctiva/sclera: Conjunctivae normal       Pupils: Pupils are equal, round, and reactive to light  Cardiovascular:      Rate and Rhythm: Normal rate and regular rhythm  Pulses: Normal pulses  Heart sounds: Normal heart sounds  No murmur heard  Pulmonary:      Effort: Pulmonary effort is normal  No respiratory distress  Breath sounds: Normal breath sounds  Abdominal:      General: Bowel sounds are normal  There is no distension  Palpations: Abdomen is soft  Tenderness: There is no abdominal tenderness  Musculoskeletal:         General: Normal range of motion  Cervical back: Normal range of motion and neck supple  Skin:     General: Skin is warm and dry  Neurological:      General: No focal deficit present  Mental Status: She is alert and oriented to person, place, and time  Mental status is at baseline  Psychiatric:         Mood and Affect: Mood normal          Behavior: Behavior normal          Judgment: Judgment normal         Discussion with Family: Updated  (mother) at bedside  Discharge instructions/Information to patient and family:   See after visit summary for information provided to patient and family        Provisions for Follow-Up Care:  See after visit summary for information related to follow-up care and any pertinent home health orders  Disposition:   Home    Planned Readmission: None     Discharge Statement:  I spent > 35 minutes discharging the patient  This time was spent on the day of discharge  I had direct contact with the patient on the day of discharge  Greater than 50% of the total time was spent examining patient, answering all patient questions, arranging and discussing plan of care with patient as well as directly providing post-discharge instructions  Additional time then spent on discharge activities  Discharge Medications:  See after visit summary for reconciled discharge medications provided to patient and/or family        **Please Note: This note may have been constructed using a voice recognition system**

## 2022-04-29 NOTE — PLAN OF CARE
Problem: PAIN - ADULT  Goal: Verbalizes/displays adequate comfort level or baseline comfort level  Description: Interventions:  - Encourage patient to monitor pain and request assistance  - Assess pain using appropriate pain scale  - Administer analgesics based on type and severity of pain and evaluate response  - Implement non-pharmacological measures as appropriate and evaluate response  - Consider cultural and social influences on pain and pain management  - Notify physician/advanced practitioner if interventions unsuccessful or patient reports new pain  Outcome: Progressing     Problem: INFECTION - ADULT  Goal: Absence or prevention of progression during hospitalization  Description: INTERVENTIONS:  - Assess and monitor for signs and symptoms of infection  - Monitor lab/diagnostic results  - Monitor all insertion sites, i e  indwelling lines, tubes, and drains  - Monitor endotracheal if appropriate and nasal secretions for changes in amount and color  - [place] appropriate cooling/warming therapies per order  - Administer medications as ordered  - Instruct and encourage patient and family to use good hand hygiene technique  - Identify and instruct in appropriate isolation precautions for identified infection/condition  Outcome: Progressing  Goal: Absence of fever/infection during neutropenic period  Description: INTERVENTIONS:  - Monitor WBC    Outcome: Progressing     Problem: SAFETY ADULT  Goal: Maintain or return to baseline ADL function  Description: INTERVENTIONS:  -  Assess patient's ability to carry out ADLs; assess patient's baseline for ADL function and identify physical deficits which impact ability to perform ADLs (bathing, care of mouth/teeth, toileting, grooming, dressing, etc )  - Assess/evaluate cause of self-care deficits   - Assess range of motion  - Assess patient's mobility; develop plan if impaired  - Assess patient's need for assistive devices and provide as appropriate  - Encourage maximum independence but intervene and supervise when necessary  - Involve family in performance of ADLs  - Assess for home care needs following discharge   - Consider OT consult to assist with ADL evaluation and planning for discharge  - Provide patient education as appropriate  Outcome: Progressing  Goal: Maintains/Returns to pre admission functional level  Description: INTERVENTIONS:  - Perform BMAT or MOVE assessment daily    - Set and communicate daily mobility goal to care team and patient/family/caregiver  - Collaborate with rehabilitation services on mobility goals if consulted  - Perform Range of Motion 3 times a day  - Reposition patient every 2 hours  - Dangle patient 2 times a day  - Stand patient 2 times a day  - Ambulate patient 2 times a day  - Out of bed to chair 2 times a day   - Out of bed for meals 2 times a day  - Out of bed for toileting  - Record patient progress and toleration of activity level   Outcome: Progressing     Problem: DISCHARGE PLANNING  Goal: Discharge to home or other facility with appropriate resources  Description: INTERVENTIONS:  - Identify barriers to discharge w/patient and caregiver  - Arrange for needed discharge resources and transportation as appropriate  - Identify discharge learning needs (meds, wound care, etc )  - Arrange for interpretive services to assist at discharge as needed  - Refer to Case Management Department for coordinating discharge planning if the patient needs post-hospital services based on physician/advanced practitioner order or complex needs related to functional status, cognitive ability, or social support system  Outcome: Progressing     Problem: Knowledge Deficit  Goal: Patient/family/caregiver demonstrates understanding of disease process, treatment plan, medications, and discharge instructions  Description: Complete learning assessment and assess knowledge base    Interventions:  - Provide teaching at level of understanding  - Provide teaching via preferred learning methods  Outcome: Progressing

## 2022-04-29 NOTE — PLAN OF CARE
Problem: Potential for Falls  Goal: Patient will remain free of falls  Description: INTERVENTIONS:  - Educate patient/family on patient safety including physical limitations  - Instruct patient to call for assistance with activity   - Consult OT/PT to assist with strengthening/mobility   - Keep Call bell within reach  - Keep bed low and locked with side rails adjusted as appropriate  - Keep care items and personal belongings within reach  - Initiate and maintain comfort rounds  - Make Fall Risk Sign visible to staff  - Offer Toileting every 2 Hours, in advance of need  - Apply yellow socks and bracelet for high fall risk patients  - Consider moving patient to room near nurses station  4/29/2022 1229 by Hank Cogan, RN  Outcome: Adequate for Discharge  4/29/2022 1229 by Hank Cogan, RN  Outcome: Progressing     Problem: PAIN - ADULT  Goal: Verbalizes/displays adequate comfort level or baseline comfort level  Description: Interventions:  - Encourage patient to monitor pain and request assistance  - Assess pain using appropriate pain scale  - Administer analgesics based on type and severity of pain and evaluate response  - Implement non-pharmacological measures as appropriate and evaluate response  - Consider cultural and social influences on pain and pain management  - Notify physician/advanced practitioner if interventions unsuccessful or patient reports new pain  4/29/2022 1229 by Hank Cogan, RN  Outcome: Adequate for Discharge  4/29/2022 1229 by Hank Cogan, RN  Outcome: Progressing     Problem: INFECTION - ADULT  Goal: Absence or prevention of progression during hospitalization  Description: INTERVENTIONS:  - Assess and monitor for signs and symptoms of infection  - Monitor lab/diagnostic results  - Monitor all insertion sites, i e  indwelling lines, tubes, and drains  - Monitor endotracheal if appropriate and nasal secretions for changes in amount and color  - [place] appropriate cooling/warming therapies per order  - Administer medications as ordered  - Instruct and encourage patient and family to use good hand hygiene technique  - Identify and instruct in appropriate isolation precautions for identified infection/condition  4/29/2022 1229 by Mia Mcnulty RN  Outcome: Adequate for Discharge  4/29/2022 1229 by Mia Mcunlty RN  Outcome: Progressing  Goal: Absence of fever/infection during neutropenic period  Description: INTERVENTIONS:  - Monitor WBC    4/29/2022 1229 by Mia Mcnulty RN  Outcome: Adequate for Discharge  4/29/2022 1229 by Mia Mcnulty RN  Outcome: Progressing     Problem: SAFETY ADULT  Goal: Maintain or return to baseline ADL function  Description: INTERVENTIONS:  -  Assess patient's ability to carry out ADLs; assess patient's baseline for ADL function and identify physical deficits which impact ability to perform ADLs (bathing, care of mouth/teeth, toileting, grooming, dressing, etc )  - Assess/evaluate cause of self-care deficits   - Assess range of motion  - Assess patient's mobility; develop plan if impaired  - Assess patient's need for assistive devices and provide as appropriate  - Encourage maximum independence but intervene and supervise when necessary  - Involve family in performance of ADLs  - Assess for home care needs following discharge   - Consider OT consult to assist with ADL evaluation and planning for discharge  - Provide patient education as appropriate  4/29/2022 1229 by iMa Mcnulty RN  Outcome: Adequate for Discharge  4/29/2022 1229 by Mia Mcnulty RN  Outcome: Progressing  Goal: Maintains/Returns to pre admission functional level  Description: INTERVENTIONS:  - Perform BMAT or MOVE assessment daily    - Set and communicate daily mobility goal to care team and patient/family/caregiver     - Collaborate with rehabilitation services on mobility goals if consulted  - Out of bed for toileting  - Record patient progress and toleration of activity level   4/29/2022 1229 by Pascale Dang RN  Outcome: Adequate for Discharge  4/29/2022 1229 by Pascale Dang RN  Outcome: Progressing     Problem: DISCHARGE PLANNING  Goal: Discharge to home or other facility with appropriate resources  Description: INTERVENTIONS:  - Identify barriers to discharge w/patient and caregiver  - Arrange for needed discharge resources and transportation as appropriate  - Identify discharge learning needs (meds, wound care, etc )  - Arrange for interpretive services to assist at discharge as needed  - Refer to Case Management Department for coordinating discharge planning if the patient needs post-hospital services based on physician/advanced practitioner order or complex needs related to functional status, cognitive ability, or social support system  4/29/2022 1229 by Pascale Dang RN  Outcome: Adequate for Discharge  4/29/2022 1229 by Pascale Dang RN  Outcome: Progressing     Problem: Knowledge Deficit  Goal: Patient/family/caregiver demonstrates understanding of disease process, treatment plan, medications, and discharge instructions  Description: Complete learning assessment and assess knowledge base    Interventions:  - Provide teaching at level of understanding  - Provide teaching via preferred learning methods  4/29/2022 1229 by Pascale Dang RN  Outcome: Adequate for Discharge  4/29/2022 1229 by Pascale Dang RN  Outcome: Progressing

## 2022-04-29 NOTE — UTILIZATION REVIEW
Inpatient Admission Authorization Request   NOTIFICATION OF INPATIENT ADMISSION/INPATIENT AUTHORIZATION REQUEST   SERVICING FACILITY:   [place] [address]  [address] [facility], [address]  Tax ID: [id number]  NPI: [id number]  Place of Service: Inpatient [facility]  [address]  Place of Service Code: 24     ATTENDING PROVIDER:  Attending Name and NPI#: Juanis Solares Do [[id number]]  Address: [address] [place] [address], [address]  Phone: [phone]     UTILIZATION REVIEW CONTACT:  Caren Rosa, Utilization   Network Utilization Review Department  Phone: [phone]  Fax [phone]  Email: [email or web address]     PHYSICIAN ADVISORY SERVICES:  FOR PKLT-GB-NCTO REVIEW - MEDICAL NECESSITY DENIAL  Phone: [phone]  Fax: [phone]  Email: [email or web address]     TYPE OF REQUEST:  Inpatient Status     ADMISSION INFORMATION:  ADMISSION DATE/TIME: 4/28/22  9:56 AM  PATIENT DIAGNOSIS CODE/DESCRIPTION:  SOB (shortness of breath) [R06 02]  Left arm weakness [R29 898]  Stroke-like symptoms [R29 90]  Shortness of breath [R06 02]  Myopericarditis [I31 9]  DISCHARGE DATE/TIME: 4/29/2022  1:10 PM   IMPORTANT INFORMATION:  Please contact the Caren Rosa directly with any questions or concerns regarding this request  Department voicemails are confidential     Send requests for admission clinical reviews, concurrent reviews, approvals, and administrative denials due to lack of clinical to fax [phone]

## 2022-04-29 NOTE — ASSESSMENT & PLAN NOTE
intermittent symptoms of numbness and paresthesias in the left arm  Also had symptoms of full body tremors   Patient does admit to missing a few doses of her Topamax  Brain MRI normal

## 2022-04-29 NOTE — UTILIZATION REVIEW
Initial Clinical Review    Admission: Date/Time/Statement:   Admission Orders (From admission, onward)     Ordered        04/28/22 0956  Inpatient Admission  Once            04/28/22 0329  Place in Observation  Once                      Orders Placed This Encounter   Procedures    Inpatient Admission     Standing Status:   Standing     Number of Occurrences:   1     Order Specific Question:   Level of Care     Answer:   Med Surg [16]     Order Specific Question:   Estimated length of stay     Answer:   More than 2 Midnights     Order Specific Question:   Certification     Answer:   I certify that inpatient services are medically necessary for this patient for a duration of greater than two midnights  See H&P and MD Progress Notes for additional information about the patient's course of treatment  ED Arrival Information     Expected Arrival Acuity    - 4/27/2022 23:56 Urgent         Means of arrival Escorted by Service Admission type    Walk-In Family Member Hospitalist Urgent         Arrival complaint    shortness of breath lt arm weakness        Chief Complaint   Patient presents with    Shortness of Breath     Pt reports SOB and chest tightness; pt also reports intermittent numbess and weakness of left arm over past few days       Initial Presentation: 23 y o  female to the ED with complaints of shortness, of breath, chest tightness, left arm numbness, weakness for 2 days  Admitted in March 2022 for myopericarditis, s/p COVID 1/2022, started on colchicine at that time and has been taking  H/O migraines  She arrives anxious, lungs are clear  GCS 15  CTA h/n shows no abnormality  MRI brain normal  DC ASA/plavix  Continue home topamax  Arm numbness more consistent with migraine  Cardiology consult  Cardiology consult:  Stroke like symptoms  REpeat ECHO  PFO on ECHO 3/25/22 with left to right shunting  She has left arm intermittent paresthesia and an episode of full body tremors     Date: 4/29   Day 2: GCS 15  INflammatory markers neg  Cardiology consult:  Repeat ECHO normal   Brain MRI normal  Admits to missing a few doses of topamax     ED Triage Vitals [04/28/22 0028]   Temperature Pulse Respirations Blood Pressure SpO2   (!) 97 3 °F (36 3 °C) 63 18 120/70 100 %      Temp Source Heart Rate Source Patient Position - Orthostatic VS BP Location FiO2 (%)   Temporal Monitor Sitting Left arm --      Pain Score       No Pain          Wt Readings from Last 1 Encounters:   04/28/22 76 8 kg (169 lb 5 oz)     Additional Vital Signs:   Date/Time Temp Pulse Resp BP MAP (mmHg) SpO2 O2 Device Patient Position - Orthostatic VS   04/29/22 0900 96 7 °F (35 9 °C) Abnormal  56 20 84/54 Abnormal  -- 98 % None (Room air) Lying   04/29/22 0500 -- 53 Abnormal  16 97/54 -- 96 % -- Lying   04/29/22 0300 -- 60 16 96/57 -- 97 % None (Room air) Lying   04/29/22 0100 98 7 °F (37 1 °C) 60 16 98/56 -- 96 % None (Room air) Lying   04/28/22 2300 99 2 °F (37 3 °C) 60 16 98/61 73 99 % -- Lying   04/28/22 2100 99 4 °F (37 4 °C) 84 16 107/64 78 97 % None (Room air) Lying   04/28/22 2000 99 °F (37 2 °C) 75 16 98/65 76 95 % None (Room air) Lying   04/28/22 1900 98 4 °F (36 9 °C) 64 16 101/67 -- 98 % None (Room air) Lying   04/28/22 1800 97 8 °F (36 6 °C) 62 17 101/59 -- 98 % None (Room air) Sitting   04/28/22 1100 97 6 °F (36 4 °C) 52 Abnormal  16 93/63 -- 99 % None (Room air) Lying   04/28/22 1048 -- 60 -- 93/50 -- -- -- --   04/28/22 1000 -- 60 16 95/52 -- 100 % None (Room air) Lying   04/28/22 0930 -- 49 Abnormal  15 93/63 -- -- None (Room air) Lying   04/28/22 0800 97 4 °F (36 3 °C) Abnormal  63 16 93/63 -- 98 % None (Room air) Lying   04/28/22 0700 -- 49 Abnormal  15 93/50 -- 96 % None (Room air) Lying   04/28/22 0500 -- 52 Abnormal  16 104/57 76 97 % None (Room air) Lying   04/28/22 0245 -- 55 16 102/67 80 93 % None (Room air) --   04/28/22 0230 -- 56 17 105/67 -- 96 % None (Room air) Lying   04/28/22 0130 -- 60 18 110/68 -- 98 % None (Room air) Lying   04/28/22 0100 -- 61 16 105/67 -- 99 % None (Room air) Sitting   04/28/22 0028 97 3 °F (36 3 °C) Abnormal  63 18 120/70 -- 100 % None (Room air) Sitting       Pertinent Labs/Diagnostic Test Results:   4/28 EKG: Normal sinus rhythm   Normal ECG   When compared with ECG of 24-MAR-2022 11:15,   No significant change was found  4/28 ECHO:     Left Ventricle: Left ventricular cavity size is normal  Wall thickness is normal  The left ventricular ejection fraction is 50-55%  Systolic function is low normal  Wall motion is normal  Diastolic function is normal      MRI brain wo contrast   Final Result by Alvaro Pozo MD (04/28 9453)      Normal brain MRI  No acute infarct, as clinically questioned  Workstation performed: [id number]         CTA ED chest PE Study   Final Result by Cordelia Palma MD (04/28 9955)      Extremely technically limited study  No evidence of large central pulmonary embolism  Evaluation of the segmental and subsegmental branches is limited  Clinical correlation is necessary  If there is persistent concern for pulmonary embolism, repeat    imaging or nuclear medicine ventilation/perfusion scanning could be performed, if there are no contraindications  There is a 6 mm nodule within the right lobe of the thyroid  Please see above discussion/recommendations/guidelines  Workstation performed: [id number]         CTA head and neck with and without contrast   Final Result by Cordelia Palma MD (04/28 1351)      No evidence of acute intracranial abnormality  No large vessel flow restrictive disease within the head or neck  There is a 6 mm nodule within the right lobe of the thyroid  Please see above discussion /recommendations/guidelines  The study was marked in [facility] for immediate notification              Workstation performed: [id number]               Results from last 7 days   Lab Units 04/29/22  0454 04/28/22  0045   WBC Thousand/uL 6 71 8 83   HEMOGLOBIN g/dL 13 9 13 9   HEMATOCRIT % 41 9 41 2   PLATELETS Thousands/uL 238 259   NEUTROS ABS Thousands/µL  --  4 63         Results from last 7 days   Lab Units 04/29/22  0454 04/28/22  0046   SODIUM mmol/L 139 137   POTASSIUM mmol/L 3 9 3 5   CHLORIDE mmol/L 109* 106   CO2 mmol/L 23 23   ANION GAP mmol/L 7 8   BUN mg/dL 19 20   CREATININE mg/dL 1 01 1 03   EGFR ml/min/1 73sq m 80 78   CALCIUM mg/dL 9 2 9 5   MAGNESIUM mg/dL  --  2 0     Results from last 7 days   Lab Units 04/28/22  0046   AST U/L 14   ALT U/L 10   ALK PHOS U/L 64   TOTAL PROTEIN g/dL 7 0   ALBUMIN g/dL 4 4   TOTAL BILIRUBIN mg/dL 0 19*         Results from last 7 days   Lab Units 04/29/22  0454 04/28/22  0046   GLUCOSE RANDOM mg/dL 91 87         Results from last 7 days   Lab Units 04/28/22  0045   HEMOGLOBIN A1C % 5 2   EAG mg/dl 103       Results from last 7 days   Lab Units 04/28/22  0046   CK TOTAL U/L 49     Results from last 7 days   Lab Units 04/28/22  0435 04/28/22  0246 04/28/22  0046   HS TNI 0HR ng/L  --   --  <2   HS TNI 2HR ng/L  --  3  --    HSTNI D2 ng/L  --  >1  --    HS TNI 4HR ng/L 2  --   --    HSTNI D4 ng/L >0  --   --        Results from last 7 days   Lab Units 04/28/22  0046   TSH 3RD GENERATON uIU/mL 1 693         Results from last 7 days   Lab Units 04/28/22  0046   LACTIC ACID mmol/L 0 6       Results from last 7 days   Lab Units 04/28/22  0045   BNP pg/mL 16         Results from last 7 days   Lab Units 04/28/22  0046   LIPASE u/L 21     Results from last 7 days   Lab Units 04/28/22  1119   CRP mg/L <1 0   SED RATE mm/hour 1       ED Treatment:   Medication Administration from 04/27/2022 4936 to 04/28/2022 1757       Date/Time Order Dose Route Action     04/28/2022 0049 sodium chloride 0 9 % bolus 1,000 mL 1,000 mL Intravenous New Bag     04/28/2022 0340 aspirin chewable tablet 324 mg 324 mg Oral Given     04/28/2022 0340 diphenhydrAMINE (BENADRYL) tablet 50 mg 50 mg Oral Given     04/28/2022 6200 enoxaparin (LOVENOX) subcutaneous injection 40 mg 40 mg Subcutaneous Given     04/28/2022 0836 topiramate (TOPAMAX) tablet 150 mg 150 mg Oral Given     04/28/2022 0837 colchicine (COLCRYS) tablet 0 6 mg 0 6 mg Oral Given     04/28/2022 0835 potassium chloride (K-DUR,KLOR-CON) CR tablet 40 mEq 40 mEq Oral Given        Past Medical History:   Diagnosis Date    Allergies     Asthma     Migraine      Admitting Diagnosis: SOB (shortness of breath) [R06 02]  Left arm weakness [R29 898]  Stroke-like symptoms [R29 90]  Shortness of breath [R06 02]  Myopericarditis [I31 9]  Age/Sex: 23 y o  female  Admission Orders:  Neuro consult: Every 1 hour x 4 hours, then every 2 hours x 8 hours, then every 4 hours x 72 hours  VItals every 4 hours  NIHSS   Up and oOB    Scheduled Medications:  colchicine, 0 6 mg, Oral, Daily  enoxaparin, 40 mg, Subcutaneous, Daily  topiramate, 150 mg, Oral, BID      Continuous IV Infusions:     PRN Meds:  acetaminophen, 650 mg, Oral, Q4H PRN        IP CONSULT TO CASE MANAGEMENT  IP CONSULT TO NUTRITION SERVICES  IP CONSULT TO CARDIOLOGY    Network Utilization Review Department  ATTENTION: Please call with any questions or concerns to [phone] and carefully listen to the prompts so that you are directed to the right person  All voicemails are confidential   Isabel Griffin all requests for admission clinical reviews, approved or denied determinations and any other requests to dedicated fax number below belonging to the campus where the patient is receiving treatment   List of dedicated fax numbers for the Facilities:  [address] DENIALS (Administrative/Medical Necessity) [phone]   [address] (Maternity/NICU/Pediatrics) [phone]   [address] [place] [address] [place] [address] [facility] [address] [address] [place] [address] [address] [phone]   [place] [address] [phone]

## 2022-04-29 NOTE — ASSESSMENT & PLAN NOTE
Small to moderate PFO on Echo 3/25/22 with left to right shunting   Patient now with stroke-like symptoms and being w/u for stroke   Brain MRI normal  Repeat Echo normal

## 2022-04-29 NOTE — PROGRESS NOTES
[address] [address]  Progress Note Sandi Lopez 2000, 23 y o  female MRN: [id number]  Unit/Bed#: -01 Encounter: [id number]  Primary Care Provider: Radha David MD   Date and time admitted to hospital: 4/28/2022 12:24 AM    PFO (patent foramen ovale)  Assessment & Plan  Small to moderate PFO on Echo 3/25/22 with left to right shunting   Patient now with stroke-like symptoms and being w/u for stroke   Brain MRI normal  Repeat Echo normal    Myopericarditis  Assessment & Plan  Cardiac MRI showing Myocarditis in 3/2022  Patient on Colchicine therapy for three months  HS troponin levels have normalized   Repeat inflammatory markers are normal     Planned for exercise stress testing in June       Migraine without aura and without status migrainosus, not intractable  Assessment & Plan  Patient on Topamax daily     * Stroke-like symptoms  Assessment & Plan  intermittent symptoms of numbness and paresthesias in the left arm  Also had symptoms of full body tremors   Patient does admit to missing a few doses of her Topamax  Brain MRI normal    Outpatient Cardiologist: Dr Galan Cone:   Patient seen and examined  No significant events overnight  Denies chest pain, SOB  No recurrent LUE symptoms    Summary comments:    Presenting cardiac symptoms have resolved and pt is feeling well  Recommend continuing Colchicine 0 6 mg bid  Planning for EST in June after completion of medical therapy and outpatient follow up at that time  Telemetry/ECG/Cardiac testing:   Echo 4/28/2022 EF 50-55%    Echo 03/25/2022 EF 55%  Small PFO    Cardiac catheterization 03/24/2020 normal coronary arteries    Vitals: Blood pressure 102/66, pulse 67, temperature 98 3 °F (36 8 °C), resp   rate 20, height 5' 8" (1 727 m), weight 76 8 kg (169 lb 5 oz), last menstrual period 04/07/2022, SpO2 99 %, not currently breastfeeding ,   Orthostatic Blood Pressures      Most Recent Value   Blood Pressure 102/66 filed at 04/29/2022 1105   Patient Position - Orthostatic VS Lying filed at 04/29/2022 0900      ,   Weight (last 2 days)     Date/Time Weight    04/28/22 1800 76 8 (169 31)    04/28/22 1048 76 2 (168)    04/28/22 0028 76 2 (168)          Physical Exam:    General:  Normal appearance in no distress  Eyes:  Anicteric  Oral mucosa:  Moist   Neck:  No JVD  Carotid upstrokes are brisk without bruits  No masses  Chest:  Clear to auscultation   Cardiac:  No palpable PMI  Normal S1 and S2  No murmur, gallop, or rub  Abdomen:  Soft and nontender  No palpable organomegaly or aortic enlargement  Extremities:  No peripheral edema  Neuro:  Grossly symmetric  Psych:  Alert and oriented x3        Medications:      Current Facility-Administered Medications:     acetaminophen (TYLENOL) tablet 650 mg, 650 mg, Oral, Q4H PRN, Ofilia Loss, PA-C    colchicine (COLCRYS) tablet 0 6 mg, 0 6 mg, Oral, Daily, [address] [facility], 0 6 mg at 04/29/22 0908    enoxaparin (LOVENOX) subcutaneous injection 40 mg, 40 mg, Subcutaneous, Daily, Ofilia Loss, PA-C, 40 mg at 04/29/22 0241    topiramate (TOPAMAX) tablet 150 mg, 150 mg, Oral, BID, Ofilia Loss, PA-C, 150 mg at 04/29/22 3174     Labs & Results:    Troponins:    Results from last 7 days   Lab Units 04/28/22  0435 04/28/22  0246 04/28/22  0046   HS TNI 0HR ng/L  --   --  <2   HS TNI 2HR ng/L  --  3  --    HSTNI D2 ng/L  --  >1  --    HS TNI 4HR ng/L 2  --   --    HSTNI D4 ng/L >0  --   --         BNP:   Results from last 6 Months   Lab Units 04/28/22  0045   BNP pg/mL 16     CBC with diff:   Results from last 7 days   Lab Units 04/29/22  0454 04/28/22  0045   WBC Thousand/uL 6 71 8 83   HEMOGLOBIN g/dL 13 9 13 9   HEMATOCRIT % 41 9 41 2   MCV fL 94 93   PLATELETS Thousands/uL 238 259     TSH:     CMP:   Results from last 7 days   Lab Units 04/29/22  0454 04/28/22  0046   POTASSIUM mmol/L 3 9 3 5   CHLORIDE mmol/L 109* 106   CO2 mmol/L 23 23   BUN mg/dL 19 20   CREATININE mg/dL 1 01 1 03   AST U/L  --  14   ALT U/L  --  10   EGFR ml/min/1 73sq m 80 78     Lipid Profile:   Results from last 7 days   Lab Units 04/28/22  0046   TRIGLYCERIDES mg/dL 103   HDL mg/dL 48*     Coags:

## 2022-05-02 NOTE — UTILIZATION REVIEW
Notification of Discharge   This is a Notification of Discharge from our facility [address]  Please be advised that this patient has been discharge from our facility  Below you will find the admission and discharge date and time including the patients disposition  UTILIZATION REVIEW CONTACT:  Jewel Chambers  Utilization   Network Utilization Review Department  Phone: [phone] carefully listen to the prompts  All voicemails are confidential   Email: [email or web address]     PHYSICIAN ADVISORY SERVICES:  FOR [id number] REVIEW - MEDICAL NECESSITY DENIAL  Phone: [phone]  Fax: [phone]  Email: [email or web address]     PRESENTATION DATE: 4/28/2022 12:24 AM  OBERVATION ADMISSION DATE:   INPATIENT ADMISSION DATE: 4/28/22  9:56 AM   DISCHARGE DATE: 4/29/2022  1:10 PM  DISPOSITION: Home/Self Care Home/Self Care      IMPORTANT INFORMATION:  Send all requests for admission clinical reviews, approved or denied determinations and any other requests to dedicated fax number below belonging to the campus where the patient is receiving treatment   List of dedicated fax numbers:  [address] DENIALS (Administrative/Medical Necessity) [phone]   [address] (Maternity/NICU/Pediatrics) [phone]   [facility] [phone]   130 [facility] [phone]   [address] [place] [address] [phone]   2000 [facility] [address] [place] [address],[address] [phone]   [facility]  [phone]   [address] [facility] [address]  2401 [facility] 1000 [address] [facility] [phone]

## 2022-06-21 ENCOUNTER — TELEPHONE (OUTPATIENT)
Dept: CARDIOLOGY CLINIC | Facility: CLINIC | Age: 22
End: 2022-06-21

## 2022-06-21 DIAGNOSIS — I31.9 MYOPERICARDITIS: ICD-10-CM

## 2022-06-21 NOTE — TELEPHONE ENCOUNTER
Patient cancelled her stress test for Monday  She is still having chest pain and feels she cannot do the stress test due to this  Also wants to know if she should continue on colchicine as she will run out in a few days and/or be seen by you for evaluation? Please advise

## 2022-06-23 RX ORDER — COLCHICINE 0.6 MG/1
0.6 TABLET ORAL 2 TIMES DAILY
Qty: 60 TABLET | Refills: 5 | Status: SHIPPED | OUTPATIENT
Start: 2022-06-23

## 2022-06-23 NOTE — TELEPHONE ENCOUNTER
Spoke with mother about Dr Landon Solid recommendations  Will place order for CRP and refill Colchicine  Patient will call Soumya García to reschedule

## 2022-07-27 ENCOUNTER — HOSPITAL ENCOUNTER (EMERGENCY)
Facility: HOSPITAL | Age: 22
Discharge: HOME | End: 2022-07-27
Attending: EMERGENCY MEDICINE
Payer: COMMERCIAL

## 2022-07-27 VITALS
RESPIRATION RATE: 16 BRPM | WEIGHT: 160 LBS | HEART RATE: 60 BPM | DIASTOLIC BLOOD PRESSURE: 68 MMHG | TEMPERATURE: 98.1 F | SYSTOLIC BLOOD PRESSURE: 103 MMHG | OXYGEN SATURATION: 100 % | BODY MASS INDEX: 24.25 KG/M2 | HEIGHT: 68 IN

## 2022-07-27 DIAGNOSIS — G43.909 MIGRAINE: Primary | ICD-10-CM

## 2022-07-27 DIAGNOSIS — E83.42 HYPOMAGNESEMIA: ICD-10-CM

## 2022-07-27 DIAGNOSIS — E86.0 DEHYDRATION: ICD-10-CM

## 2022-07-27 DIAGNOSIS — E87.6 HYPOKALEMIA: ICD-10-CM

## 2022-07-27 LAB
ALBUMIN SERPL BCP-MCNC: 4 G/DL (ref 3.5–5)
ALP SERPL-CCNC: 50 U/L (ref 34–104)
ALT SERPL W P-5'-P-CCNC: 7 U/L (ref 7–52)
ANION GAP SERPL CALCULATED.3IONS-SCNC: 5 MMOL/L (ref 4–13)
AST SERPL W P-5'-P-CCNC: 11 U/L (ref 13–39)
BASOPHILS # BLD AUTO: 0.03 THOUSANDS/ΜL (ref 0–0.1)
BASOPHILS NFR BLD AUTO: 0 % (ref 0–1)
BILIRUB SERPL-MCNC: 0.2 MG/DL (ref 0.2–1)
BUN SERPL-MCNC: 17 MG/DL (ref 5–25)
CALCIUM SERPL-MCNC: 8.9 MG/DL (ref 8.4–10.2)
CHLORIDE SERPL-SCNC: 110 MMOL/L (ref 96–108)
CO2 SERPL-SCNC: 24 MMOL/L (ref 21–32)
CREAT SERPL-MCNC: 0.91 MG/DL (ref 0.6–1.3)
EOSINOPHIL # BLD AUTO: 0.12 THOUSAND/ΜL (ref 0–0.61)
EOSINOPHIL NFR BLD AUTO: 2 % (ref 0–6)
ERYTHROCYTE [DISTWIDTH] IN BLOOD BY AUTOMATED COUNT: 12.2 % (ref 11.6–15.1)
GFR SERPL CREATININE-BSD FRML MDRD: 91 ML/MIN/1.73SQ M
GLUCOSE SERPL-MCNC: 93 MG/DL (ref 65–140)
HCT VFR BLD AUTO: 37.7 % (ref 34.8–46.1)
HGB BLD-MCNC: 12 G/DL (ref 11.5–15.4)
IMM GRANULOCYTES # BLD AUTO: 0.02 THOUSAND/UL (ref 0–0.2)
IMM GRANULOCYTES NFR BLD AUTO: 0 % (ref 0–2)
LYMPHOCYTES # BLD AUTO: 2.78 THOUSANDS/ΜL (ref 0.6–4.47)
LYMPHOCYTES NFR BLD AUTO: 39 % (ref 14–44)
MAGNESIUM SERPL-MCNC: 1.8 MG/DL (ref 1.9–2.7)
MCH RBC QN AUTO: 30.3 PG (ref 26.8–34.3)
MCHC RBC AUTO-ENTMCNC: 31.8 G/DL (ref 31.4–37.4)
MCV RBC AUTO: 95 FL (ref 82–98)
MONOCYTES # BLD AUTO: 0.72 THOUSAND/ΜL (ref 0.17–1.22)
MONOCYTES NFR BLD AUTO: 10 % (ref 4–12)
NEUTROPHILS # BLD AUTO: 3.5 THOUSANDS/ΜL (ref 1.85–7.62)
NEUTS SEG NFR BLD AUTO: 49 % (ref 43–75)
NRBC BLD AUTO-RTO: 0 /100 WBCS
PLATELET # BLD AUTO: 246 THOUSANDS/UL (ref 149–390)
PMV BLD AUTO: 10.7 FL (ref 8.9–12.7)
POTASSIUM SERPL-SCNC: 3.2 MMOL/L (ref 3.5–5.3)
PROT SERPL-MCNC: 6.4 G/DL (ref 6.4–8.4)
RBC # BLD AUTO: 3.96 MILLION/UL (ref 3.81–5.12)
SODIUM SERPL-SCNC: 139 MMOL/L (ref 135–147)
WBC # BLD AUTO: 7.17 THOUSAND/UL (ref 4.31–10.16)

## 2022-07-27 PROCEDURE — 96374 THER/PROPH/DIAG INJ IV PUSH: CPT

## 2022-07-27 PROCEDURE — 96361 HYDRATE IV INFUSION ADD-ON: CPT

## 2022-07-27 PROCEDURE — 85025 COMPLETE CBC W/AUTO DIFF WBC: CPT | Performed by: EMERGENCY MEDICINE

## 2022-07-27 PROCEDURE — 83735 ASSAY OF MAGNESIUM: CPT | Performed by: EMERGENCY MEDICINE

## 2022-07-27 PROCEDURE — 80053 COMPREHEN METABOLIC PANEL: CPT | Performed by: EMERGENCY MEDICINE

## 2022-07-27 PROCEDURE — 99283 EMERGENCY DEPT VISIT LOW MDM: CPT

## 2022-07-27 PROCEDURE — 99285 EMERGENCY DEPT VISIT HI MDM: CPT | Performed by: EMERGENCY MEDICINE

## 2022-07-27 PROCEDURE — 36415 COLL VENOUS BLD VENIPUNCTURE: CPT | Performed by: EMERGENCY MEDICINE

## 2022-07-27 RX ORDER — METOCLOPRAMIDE HYDROCHLORIDE 5 MG/ML
5 INJECTION INTRAMUSCULAR; INTRAVENOUS ONCE
Status: COMPLETED | OUTPATIENT
Start: 2022-07-27 | End: 2022-07-27

## 2022-07-27 RX ORDER — DIPHENHYDRAMINE HCL 25 MG
25 TABLET ORAL ONCE
Status: COMPLETED | OUTPATIENT
Start: 2022-07-27 | End: 2022-07-27

## 2022-07-27 RX ORDER — POTASSIUM CHLORIDE 20 MEQ/1
40 TABLET, EXTENDED RELEASE ORAL ONCE
Status: COMPLETED | OUTPATIENT
Start: 2022-07-27 | End: 2022-07-27

## 2022-07-27 RX ADMIN — POTASSIUM CHLORIDE 40 MEQ: 1500 TABLET, EXTENDED RELEASE ORAL at 04:21

## 2022-07-27 RX ADMIN — METOCLOPRAMIDE HYDROCHLORIDE 5 MG: 5 INJECTION INTRAMUSCULAR; INTRAVENOUS at 01:43

## 2022-07-27 RX ADMIN — DIPHENHYDRAMINE HCL 25 MG: 25 TABLET ORAL at 01:44

## 2022-07-27 RX ADMIN — MAGNESIUM OXIDE TAB 400 MG (241.3 MG ELEMENTAL MG) 800 MG: 400 (241.3 MG) TAB at 04:21

## 2022-07-27 RX ADMIN — SODIUM CHLORIDE 1000 ML: 0.9 INJECTION, SOLUTION INTRAVENOUS at 01:40

## 2022-07-27 NOTE — ED PROVIDER NOTES
History  Chief Complaint   Patient presents with    Headache     Pt reports hx migraines and this h/a feels similar, n/v, pt also complaining L arm numbness (ongoing problem x few months)     23YEAR-OLD FEMALE  PMH:  CHRONIC HEADACHES, since 8th grade  Takes Topamax daily      PATIENT IS HERE FOR ACUTE EXACERBATION  Started this evening around 9pm    CAME ON GRADUALLY  IT IS NOW  RATED 6/10  DESCRIBED AS Stabbing    SHE STATES THAT HER SYMPTOMS ARE STABLE IN CHARACTER AND INTENSITY WHEN COMPARED TO PRIOR HEADACHES  SHE STATES SHE HAS NO NEW SYMPTOMS WHEN COMPARED TO PRIOR HEADACHES      She [place] the beach today and feels like she may be dehydrated, which has been a precipitant for migraines in the past    She has no unilateral weakness or numbness      PAIN LOCATION:  SHE REPORTS NAUSEA  VOMITED twice    NO FEVERS, reports CHILLS  Reports  LIGHT SENSITIVITY    NO NECK PAIN OR STIFFNESS    NO TRAUMA    NO PT HX OR FAM HX OF ANEURYSM  PAIN DID NOT COME ON WITH EXERTION AND WAS NOT SUDDEN, OR DESCRIBED AS WORST HEADACHE OF HER LIFE      INTERVENTIONS: None   Did not take any medications today      PRIOR WORK UP:   Katherine Barney had MRI prior to evaluate her headaches  SHE FOLLOWS W/ NEUROLOGY      OTHER ASSOCIATED SYMPTOMS:  SHE DOES NOT HAVE ANY ABDOMINAL PAIN ASSOCIATED  URINARY  SYMPTOMS: THERE IS NO DYSURIA, NO HEMATURIA, NO FREQUENCY  NO STOOL CHANGES    ALLEVIATING OR EXACERBATING FACTORS:  UNCERTAIN        History provided by:  Patient  Headache  Pain location:  Frontal  Quality:  Sharp and stabbing  Severity currently:  6/10  Onset quality:  Gradual  Chronicity:  Recurrent  Similar to prior headaches: yes    Relieved by:  Nothing  Worsened by:  Nothing  Ineffective treatments:  None tried  Associated symptoms: no abdominal pain, no back pain, no blurred vision, no congestion, no cough, no diarrhea, no dizziness, no drainage, no ear pain, no eye pain, no facial pain, no fatigue, no fever, no focal weakness, no hearing loss, no loss of balance, no myalgias, no nausea, no near-syncope, no neck pain, no neck stiffness, no numbness, no paresthesias, no photophobia, no seizures, no sinus pressure, no sore throat, no swollen glands, no syncope, no tingling, no URI, no visual change, no vomiting and no weakness        Prior to Admission Medications   Prescriptions Last Dose Informant Patient Reported? Taking? albuterol (ProAir HFA) 90 mcg/act inhaler  Self No No   Sig: Inhale 2 puffs every 6 (six) hours as needed (exercise induced asthma)   colchicine (COLCRYS) 0 6 mg tablet   No No   Sig: Take 1 tablet (0 6 mg total) by mouth 2 (two) times a day   topiramate (TOPAMAX) 100 mg tablet  Self Yes No   Sig: Take 100 mg by mouth 2 (two) times a day      Facility-Administered Medications: None       Past Medical History:   Diagnosis Date    Allergies     Asthma     Carditis     Migraine        Past Surgical History:   Procedure Laterality Date    CARDIAC CATHETERIZATION N/A 3/24/2022    Procedure: Cardiac catheterization;  Surgeon: Glory Loera MD;  Location: [facility]; Service: Cardiology    CARDIAC CATHETERIZATION N/A 3/24/2022       Family History   Problem Relation Age of Onset    No Known Problems Mother     No Known Problems Father     Clotting disorder Family      I have reviewed and agree with the history as documented  E-Cigarette/Vaping    E-Cigarette Use Current Some Day User      E-Cigarette/Vaping Substances    Nicotine Yes      Social History     Tobacco Use    Smoking status: Never Smoker    Smokeless tobacco: Never Used   Vaping Use    Vaping Use: Some days    Substances: Nicotine   Substance Use Topics    Alcohol use: Yes     Comment: socially    Drug use: No       Review of Systems   Constitutional: Negative for chills, diaphoresis, fatigue and fever     HENT: Negative for congestion, ear pain, hearing loss, postnasal drip, sinus pressure, sinus pain, sneezing, sore throat, trouble swallowing and voice change  Eyes: Negative for blurred vision, photophobia, pain, discharge, redness, itching and visual disturbance  Respiratory: Negative for cough, shortness of breath, wheezing and stridor  Cardiovascular: Negative for chest pain, palpitations, leg swelling, syncope and near-syncope  Gastrointestinal: Negative for abdominal pain, blood in stool, diarrhea, nausea and vomiting  Genitourinary: Negative for difficulty urinating, dysuria, flank pain and frequency  Musculoskeletal: Negative for arthralgias, back pain, gait problem, joint swelling, myalgias, neck pain and neck stiffness  Skin: Negative for rash and wound  Neurological: Positive for headaches  Negative for dizziness, focal weakness, seizures, weakness, light-headedness, numbness, paresthesias and loss of balance  All other systems reviewed and are negative  Physical Exam  Physical Exam  Constitutional:       General: She is not in acute distress  Appearance: She is well-developed  She is not ill-appearing, toxic-appearing or diaphoretic  HENT:      Head: Normocephalic and atraumatic  Right Ear: External ear normal       Left Ear: External ear normal       Nose: Nose normal    Eyes:      General: No scleral icterus  Right eye: No discharge  Left eye: No discharge  Extraocular Movements: Extraocular movements intact  Conjunctiva/sclera: Conjunctivae normal       Pupils: Pupils are equal, round, and reactive to light  Neck:      Vascular: No carotid bruit or JVD  Trachea: No tracheal deviation  Cardiovascular:      Rate and Rhythm: Normal rate and regular rhythm  Pulses: Normal pulses  Heart sounds: Normal heart sounds  No murmur heard  No friction rub  No gallop  Pulmonary:      Effort: Pulmonary effort is normal  No respiratory distress  Breath sounds: Normal breath sounds  No stridor  No wheezing, rhonchi or rales     Chest: Chest wall: No tenderness  Abdominal:      General: Bowel sounds are normal  There is no distension  Palpations: Abdomen is soft  There is no mass  Tenderness: There is no abdominal tenderness  There is no right CVA tenderness, left CVA tenderness, guarding or rebound  Hernia: No hernia is present  Musculoskeletal:         General: No swelling, tenderness, deformity or signs of injury  Normal range of motion  Cervical back: Normal range of motion and neck supple  No rigidity or tenderness  Right lower leg: No edema  Left lower leg: No edema  Lymphadenopathy:      Cervical: No cervical adenopathy  Skin:     General: Skin is warm  Capillary Refill: Capillary refill takes less than 2 seconds  Coloration: Skin is not jaundiced or pale  Findings: No bruising, erythema, lesion or rash  Neurological:      General: No focal deficit present  Mental Status: She is alert and oriented to person, place, and time  Mental status is at baseline  Cranial Nerves: No cranial nerve deficit  Sensory: No sensory deficit  Motor: No weakness or abnormal muscle tone  Coordination: Coordination normal       Gait: Gait normal    Psychiatric:         Behavior: Behavior normal          Thought Content:  Thought content normal          Judgment: Judgment normal       Comments: Slightly agitated affect due to pain         Vital Signs  ED Triage Vitals [07/27/22 0053]   Temperature Pulse Respirations Blood Pressure SpO2   98 1 °F (36 7 °C) 60 16 103/68 100 %      Temp Source Heart Rate Source Patient Position - Orthostatic VS BP Location FiO2 (%)   Oral Monitor Sitting Right arm --      Pain Score       6           Vitals:    07/27/22 0053   BP: 103/68   Pulse: 60   Patient Position - Orthostatic VS: Sitting         Visual Acuity      ED Medications  Medications - No data to display    Diagnostic Studies  Results Reviewed     None                 No orders to display Procedures  Procedures         ED Course  ED Course as of 07/27/22 0802   Wed Jul 27, 2022   0329 CMP(!)   0329 Magnesium(!)   0329 CBC and differential   0414 Pt feels much much better  She has no signs of life or limb threatening process    I offered further tx, I offered admission, if she felt ill, or her pain was too great, but she declined    She is ready to manage from home    She is very appreciative of her ED care and is ready to manage from home  She understands return precautions    She will f/u w/ PCP tomorrow, as well as Neurology                                               MDM    Disposition  Final diagnoses:   None     ED Disposition     None      Follow-up Information    None         Patient's Medications   Discharge Prescriptions    No medications on file       No discharge procedures on file      PDMP Review     None          ED Provider  Electronically Signed by           Wing Chirinos MD  07/27/22 8939

## 2022-07-27 NOTE — DISCHARGE INSTRUCTIONS
RETURN IF WORSE IN ANY WAY:   INCREASED PAIN,   FEVER OR FLU LIKE SYMPTOMS,   NECK STIFFNESS,  VOMITING,  SEIZURE ACTIVITY OR CONFUSION,  OR NEW AND CONCERNING SYMPTOMS SIGNS OR SYMPTOMS      PLEASE CALL YOUR PRIMARY DOCTOR IN THE MORNING TO SET UP FOLLOW UP IN 1-2 DAYS  PLEASE REVIEW THE WORK UP RESULTS WITH YOUR DOCTOR

## 2022-07-27 NOTE — Clinical Note
Eleonora Salazarpiter was seen and treated in our emergency department on 7/27/2022  No restrictions            Diagnosis:     Brittany Kam  may return to work on return date  She may return on this date: 07/28/2022         If you have any questions or concerns, please don't hesitate to call        Tali Gambino MD    ______________________________           _______________          _______________  Hospital Representative                              Date                                Time

## 2022-07-28 LAB
AORTIC ROOT: 2.7 CM
AORTIC VALVE MEAN VELOCITY: 7.1 M/S
APICAL FOUR CHAMBER EJECTION FRACTION: 58 %
ASCENDING AORTA: 2.8 CM
AV AREA BY CONTINUOUS VTI: 2.4 CM2
AV AREA PEAK VELOCITY: 2.6 CM2
AV LVOT MEAN GRADIENT: 1 MMHG
AV LVOT PEAK GRADIENT: 3 MMHG
AV MEAN GRADIENT: 2 MMHG
AV PEAK GRADIENT: 4 MMHG
AV VALVE AREA: 2.42 CM2
AV VELOCITY RATIO: 0.83
DOP CALC AO PEAK VEL: 1.01 M/S
DOP CALC AO VTI: 24.86 CM
DOP CALC LVOT AREA: 3.14 CM2
DOP CALC LVOT DIAMETER: 2 CM
DOP CALC LVOT PEAK VEL VTI: 19.14 CM
DOP CALC LVOT PEAK VEL: 0.84 M/S
DOP CALC LVOT STROKE INDEX: 31.5 ML/M2
DOP CALC LVOT STROKE VOLUME: 60.1 CM3
E WAVE DECELERATION TIME: 363 MS
FRACTIONAL SHORTENING: 33 % (ref 28–44)
INTERVENTRICULAR SEPTUM IN DIASTOLE (PARASTERNAL SHORT AXIS VIEW): 0.7 CM
INTERVENTRICULAR SEPTUM: 0.7 CM (ref 0.6–1.1)
LAAS-AP4: 13 CM2
LEFT ATRIUM AREA SYSTOLE SINGLE PLANE A4C: 13 CM2
LEFT ATRIUM SIZE: 2.7 CM
LEFT INTERNAL DIMENSION IN SYSTOLE: 3.5 CM (ref 2.1–4)
LEFT VENTRICULAR INTERNAL DIMENSION IN DIASTOLE: 5.2 CM (ref 3.5–6)
LEFT VENTRICULAR POSTERIOR WALL IN END DIASTOLE: 0.7 CM
LEFT VENTRICULAR STROKE VOLUME: 76 ML
LVSV (TEICH): 76 ML
MV E'TISSUE VEL-SEP: 14 CM/S
MV PEAK A VEL: 0.34 M/S
MV PEAK E VEL: 77 CM/S
MV STENOSIS PRESSURE HALF TIME: 105 MS
MV VALVE AREA P 1/2 METHOD: 2.1 CM2
RIGHT ATRIUM AREA SYSTOLE A4C: 14.1 CM2
RIGHT VENTRICLE ID DIMENSION: 3.7 CM
SL CV LV EF: 50
SL CV PED ECHO LEFT VENTRICLE DIASTOLIC VOLUME (MOD BIPLANE) 2D: 127 ML
SL CV PED ECHO LEFT VENTRICLE SYSTOLIC VOLUME (MOD BIPLANE) 2D: 51 ML
TR MAX PG: 14 MMHG
TR PEAK VELOCITY: 1.9 M/S
TRICUSPID VALVE PEAK REGURGITATION VELOCITY: 1.86 M/S

## 2022-08-29 ENCOUNTER — APPOINTMENT (OUTPATIENT)
Dept: LAB | Facility: CLINIC | Age: 22
End: 2022-08-29
Payer: COMMERCIAL

## 2022-08-29 DIAGNOSIS — I31.9 MYOPERICARDITIS: ICD-10-CM

## 2022-08-29 LAB — CRP SERPL QL: <3 MG/L

## 2022-08-29 PROCEDURE — 86140 C-REACTIVE PROTEIN: CPT

## 2022-08-29 PROCEDURE — 36415 COLL VENOUS BLD VENIPUNCTURE: CPT

## 2022-09-16 ENCOUNTER — HOSPITAL ENCOUNTER (OUTPATIENT)
Dept: NON INVASIVE DIAGNOSTICS | Facility: CLINIC | Age: 22
Discharge: HOME | End: 2022-09-16
Payer: COMMERCIAL

## 2022-09-16 VITALS
HEIGHT: 68 IN | RESPIRATION RATE: 16 BRPM | SYSTOLIC BLOOD PRESSURE: 106 MMHG | OXYGEN SATURATION: 100 % | WEIGHT: 160 LBS | HEART RATE: 64 BPM | BODY MASS INDEX: 24.25 KG/M2 | DIASTOLIC BLOOD PRESSURE: 64 MMHG

## 2022-09-16 DIAGNOSIS — I31.9 MYOPERICARDITIS: ICD-10-CM

## 2022-09-16 DIAGNOSIS — Q21.12 PATENT FORAMEN OVALE: ICD-10-CM

## 2022-09-16 LAB
ARRHY DURING EX: NORMAL
CHEST PAIN STATEMENT: NORMAL
MAX DIASTOLIC BP: 84 MMHG
MAX HEART RATE: 190 BPM
MAX HR PERCENT: 94 %
MAX HR: 187 BPM
MAX PREDICTED HEART RATE: 200 BPM
MAX. SYSTOLIC BP: 172 MMHG
PROTOCOL NAME: NORMAL
RATE PRESSURE PRODUCT: NORMAL
SL CV STRESS RECOVERY BP: NORMAL MMHG
SL CV STRESS RECOVERY HR: 110 BPM
SL CV STRESS RECOVERY O2 SAT: 98 %
SL CV STRESS STAGE REACHED: 4
STRESS ANGINA INDEX: 0
STRESS BASELINE BP: NORMAL MMHG
STRESS BASELINE HR: 64 BPM
STRESS O2 SAT REST: 100 %
STRESS PEAK HR: 187 BPM
STRESS POST ESTIMATED WORKLOAD: 11.7 METS
STRESS POST EXERCISE DUR MIN: 10 MIN
STRESS POST EXERCISE DUR SEC: 1 SEC
STRESS POST O2 SAT PEAK: 99 %
STRESS POST PEAK BP: 172 MMHG
TARGET HR FORMULA: NORMAL
TEST INDICATION: NORMAL
TIME IN EXERCISE PHASE: NORMAL

## 2022-09-16 PROCEDURE — 93350 STRESS TTE ONLY: CPT

## 2022-09-16 PROCEDURE — 93350 STRESS TTE ONLY: CPT | Performed by: INTERNAL MEDICINE

## 2022-09-19 ENCOUNTER — OFFICE VISIT (OUTPATIENT)
Dept: CARDIOLOGY CLINIC | Facility: CLINIC | Age: 22
End: 2022-09-19
Payer: COMMERCIAL

## 2022-09-19 VITALS
HEART RATE: 62 BPM | SYSTOLIC BLOOD PRESSURE: 116 MMHG | HEIGHT: 68 IN | BODY MASS INDEX: 25.31 KG/M2 | DIASTOLIC BLOOD PRESSURE: 70 MMHG | WEIGHT: 167 LBS

## 2022-09-19 DIAGNOSIS — I31.9 MYOPERICARDITIS: Primary | ICD-10-CM

## 2022-09-19 DIAGNOSIS — Q21.12 PFO (PATENT FORAMEN OVALE): ICD-10-CM

## 2022-09-19 PROCEDURE — 99213 OFFICE O/P EST LOW 20 MIN: CPT | Performed by: NURSE PRACTITIONER

## 2022-09-19 NOTE — LETTER
To Whom it May Concern:    Prabha Degroto has been under our care for a cardiac condition  She recently underwent follow up testing which was normal       Prabha Degroot is cleared to resume playing softball and activities associated with it  She has been instructed to resume her activities slowly and gradually increase as she feels it is tolerated  She has been instructed to notify our office with any recurrent or new cardiac symptoms        Sincerely,      Anny Pedersen, Sherwin Bobo

## 2022-09-19 NOTE — PROGRESS NOTES
Patient ID: Elie Morrow is a 23 y o  female  Plan:      Myopericarditis  S/p colchicine  Sx improved  Follow up testing normal      PFO (patent foramen ovale)  Small to moderate on echo  No indication for additional workup or intervention        Follow up Plan/Summary Comments:  Everardo Hernandez is cleared to resume desired physical activity  I recommended that she begin slowly and gradually increase as tolerated  There is no indication for follow-up testing  Everardo Hernandez will be seen in the office on an as-needed basis  HPI:  I had the pleasure of seeing Everardo Hernandez in the office today to review results of her recent stress echo  Everardo Hernandez was evaluated in March with pleuritic chest pain  Cardiac catheterization was performed due to elevated troponin levels  This was normal   Cardiac MRI revealed focal myocarditis  She was treated with colchicine  At that time, she was advised routine from sports and significant physical activity  She was recently seen in the office and underwent a stress echo which was normal     Everardo Hernandez has been feeling well  She has been taking leisurely walks but avoiding strenuous activity  She denies any recurrent chest pain or pressure, tightness, burning  She denies any shortness of breath  She reports that she felt well when on the treadmill, just that she was out of shape  She denies any dizziness, lightheadedness, syncope  Review of Systems   10  point ROS  was otherwise non pertinent or negative except as per HPI or as below  Gait: Normal      Most recent or relevant cardiac/vascular testing:    Stress Echo 9/16/2022 Normal, LV function now normal    Echo 4/28/2022 EF 50-55%     Echo 03/25/2022 EF 55%  Small PFO     Cardiac catheterization 03/24/2020 normal coronary arteries    Objective:     /70   Pulse 62   Ht 5' 8" (1 727 m)   Wt 75 8 kg (167 lb)   BMI 25 39 kg/m²     PHYSICAL EXAM:    General:  Normal appearance, no acute distress  Eyes:  Anicteric    Oral mucosa:  Moist   Neck:  No JVD  Carotid upstrokes are brisk without bruits  No masses  Chest:  Clear to auscultation   Cardiac:  No palpable PMI  Normal S1 and S2  No murmur gallop or rub  Abdomen:  Soft and nontender  No palpable organomegaly or aortic enlargement  Extremities:  No peripheral edema  Musculoskeletal:  Symmetric  Vascular:  Pedal pulses are intact  Neuro:  Grossly symmetric  Psych:  Alert and oriented x3  No Known Allergies    Current Outpatient Medications:     topiramate (TOPAMAX) 100 mg tablet, Take 100 mg by mouth 2 (two) times a day, Disp: , Rfl:     albuterol (ProAir HFA) 90 mcg/act inhaler, Inhale 2 puffs every 6 (six) hours as needed (exercise induced asthma) (Patient not taking: Reported on 9/19/2022), Disp: 8 5 g, Rfl: 0    colchicine (COLCRYS) 0 6 mg tablet, Take 1 tablet (0 6 mg total) by mouth 2 (two) times a day (Patient not taking: No sig reported), Disp: 60 tablet, Rfl: 5  Past Medical History:   Diagnosis Date    Allergies     Asthma     Carditis     Migraine      Past Surgical History:   Procedure Laterality Date    CARDIAC CATHETERIZATION N/A 3/24/2022    Procedure: Cardiac catheterization;  Surgeon: Kerry Johnson MD;  Location: [facility];   Service: Cardiology    CARDIAC CATHETERIZATION N/A 3/24/2022       CMP:   Lab Results   Component Value Date    K 3 2 (L) 07/27/2022     (H) 07/27/2022    CO2 24 07/27/2022    BUN 17 07/27/2022    CREATININE 0 91 07/27/2022    EGFR 91 07/27/2022     Lipid Profile:    Lab Results   Component Value Date    TRIG 103 04/28/2022    HDL 48 (L) 04/28/2022         Social History     Tobacco Use   Smoking Status Never Smoker   Smokeless Tobacco Never Used

## 2022-09-22 LAB
ARRHY DURING EX: NORMAL
CHEST PAIN STATEMENT: NORMAL
MAX DIASTOLIC BP: 84 MMHG
MAX HEART RATE: 190 BPM
MAX PREDICTED HEART RATE: 200 BPM
MAX. SYSTOLIC BP: 172 MMHG
PROTOCOL NAME: NORMAL
TARGET HR FORMULA: NORMAL
TEST INDICATION: NORMAL
TIME IN EXERCISE PHASE: NORMAL

## 2022-10-18 ENCOUNTER — OFFICE VISIT (OUTPATIENT)
Dept: URGENT CARE | Facility: MEDICAL CENTER | Age: 22
End: 2022-10-18
Payer: COMMERCIAL

## 2022-10-18 VITALS
WEIGHT: 160 LBS | OXYGEN SATURATION: 99 % | DIASTOLIC BLOOD PRESSURE: 78 MMHG | TEMPERATURE: 98.2 F | HEART RATE: 61 BPM | HEIGHT: 68 IN | RESPIRATION RATE: 18 BRPM | BODY MASS INDEX: 24.25 KG/M2 | SYSTOLIC BLOOD PRESSURE: 122 MMHG

## 2022-10-18 DIAGNOSIS — Z02.5 SPORTS PHYSICAL: Primary | ICD-10-CM

## 2022-10-18 DIAGNOSIS — Z76.89 ENCOUNTER TO ESTABLISH CARE WITH NEW DOCTOR: ICD-10-CM

## 2022-10-18 PROCEDURE — G0382 LEV 3 HOSP TYPE B ED VISIT: HCPCS

## 2022-10-18 NOTE — PROGRESS NOTES
[address] [facility]        NAME: Guillaume Brown is a 23 y o  female  : 2000    MRN: [id number]  DATE: 2022  TIME: 4:48 PM    Assessment and Plan   Sports physical [Z02 5]  1  Sports physical     2  Encounter to establish care with new doctor  Ambulatory Referral to [facility]   Patient presents for a sports physical  She has a medical history that includes: migraine, s/p myocarditis, s/p COVID and asthma  She had a PCP however the office has closed  She has been following with cardiology who cleared her to resume her normal sports/physical activities  At this time, asthma is stable, SOB that occurs with exercise is relieved with inhaler  Migraines have been under control with medication  Patient compliant with medication/ specialist f/u   She also has been working with the   Assessment WNL  Normal systems noted on exam  Advised will clear with recommendation to establish care with new PCP for ongoing monitoring of chronic conditions  Patient Instructions       Follow up with PCP establishment    Chief Complaint   No chief complaint on file  History of Present Illness       Patient presents for a sports physical  She has a medical history that includes: migraine, s/p myocarditis, s/p COVID and asthma  She had a PCP however the office has closed  She has been following with cardiology who cleared her to resume her normal sports/physical activities  At this time, asthma is stable, SOB that occurs with exercise is relieved with inhaler  Migraines have been under control with medication  Patient compliant with medication/ specialist f/u   She also has been working with the   Assessment WNL  Normal systems noted on exam  Advised will clear with recommendation to establish care with new PCP for ongoing monitoring of chronic conditions  Review of Systems   Review of Systems   Constitutional: Negative for chills and fever     HENT: Negative for congestion, ear pain, postnasal drip, sinus pain and sore throat  Eyes: Negative for pain and itching  Respiratory: Negative for cough, shortness of breath and wheezing  Cardiovascular: Negative for chest pain and palpitations  Gastrointestinal: Negative for abdominal pain, constipation, diarrhea, nausea and vomiting  Genitourinary: Negative for difficulty urinating and hematuria  Musculoskeletal: Negative for arthralgias and myalgias  Skin: Negative for rash  Neurological: Negative for dizziness, light-headedness and headaches  Psychiatric/Behavioral: Negative for agitation and sleep disturbance  The patient is not nervous/anxious  Current Medications       Current Outpatient Medications:   •  albuterol (ProAir HFA) 90 mcg/act inhaler, Inhale 2 puffs every 6 (six) hours as needed (exercise induced asthma) (Patient not taking: Reported on 9/19/2022), Disp: 8 5 g, Rfl: 0  •  colchicine (COLCRYS) 0 6 mg tablet, Take 1 tablet (0 6 mg total) by mouth 2 (two) times a day (Patient not taking: No sig reported), Disp: 60 tablet, Rfl: 5  •  topiramate (TOPAMAX) 100 mg tablet, Take 100 mg by mouth 2 (two) times a day, Disp: , Rfl:     Current Allergies     Allergies as of 10/18/2022   • (No Known Allergies)            The following portions of the patient's history were reviewed and updated as appropriate: allergies, current medications, past family history, past medical history, past social history, past surgical history and problem list      Past Medical History:   Diagnosis Date   • Allergies    • Asthma    • Carditis    • Migraine        Past Surgical History:   Procedure Laterality Date   • CARDIAC CATHETERIZATION N/A 3/24/2022    Procedure: Cardiac catheterization;  Surgeon: Lakshmi Quispe MD;  Location: [facility];   Service: Cardiology   • CARDIAC CATHETERIZATION N/A 3/24/2022       Family History   Problem Relation Age of Onset   • No Known Problems Mother    • No Known Problems Father    • Clotting disorder Family          Medications have been verified  Objective   /78   Pulse 61   Temp 98 2 °F (36 8 °C)   Resp 18   Ht 5' 8" (1 727 m)   Wt 72 6 kg (160 lb)   SpO2 99%   BMI 24 33 kg/m²   No LMP recorded  Physical Exam     Physical Exam  Vitals reviewed  Constitutional:       General: She is not in acute distress  Appearance: Normal appearance  HENT:      Head: Normocephalic and atraumatic  Right Ear: Tympanic membrane and ear canal normal       Left Ear: Tympanic membrane and ear canal normal       Mouth/Throat:      Mouth: Mucous membranes are moist       Pharynx: No oropharyngeal exudate or posterior oropharyngeal erythema  Eyes:      Extraocular Movements: Extraocular movements intact  Conjunctiva/sclera: Conjunctivae normal       Pupils: Pupils are equal, round, and reactive to light  Cardiovascular:      Rate and Rhythm: Normal rate and regular rhythm  Pulses: Normal pulses  Heart sounds: Normal heart sounds  No murmur heard  Pulmonary:      Effort: Pulmonary effort is normal  No respiratory distress  Breath sounds: Normal breath sounds  Abdominal:      General: Abdomen is flat  Bowel sounds are normal  There is no distension  Palpations: Abdomen is soft  Tenderness: There is no abdominal tenderness  There is no guarding or rebound  Musculoskeletal:         General: No swelling or tenderness  Normal range of motion  Cervical back: Normal range of motion and neck supple  No tenderness  Skin:     General: Skin is warm  Neurological:      General: No focal deficit present  Mental Status: She is alert     Psychiatric:         Mood and Affect: Mood normal          Behavior: Behavior normal          Judgment: Judgment normal

## 2022-11-11 NOTE — ED PROVIDER NOTES
History  Chief Complaint   Patient presents with    Loss of Consciousness     nausea, dizziness     This is a 68-year-old white female who has had headache for the past 3 days  She has had no fever sore throat associated with it  Pain has been intermittent  Seems to worsen when she stood up  Today when she went home from her father's, being picked up by her mother, and upon returning home when she got out of the car she passed out  She hit the sidewalk with the front and side of her head  Was out for about 20 seconds per the mother  Was disoriented upon coming to and slowly regained normal main mentation  She has not been eating or drinking quite as well as she should, but did have Ramana noodles today  She has had no nausea vomiting associated with this  No recent travel  No sick contacts  She has chronic headaches but not quite like this  She has nausea associated with this headache  She gets the Depakote shot, so does not have her menses  History provided by:  Patient   used: No        Prior to Admission Medications   Prescriptions Last Dose Informant Patient Reported? Taking? medroxyPROGESTERone (DEPO-PROVERA) 150 mg/mL injection   Yes Yes   Sig: Inject 150 mg into a muscle every 3 (three) months   topiramate (TOPAMAX) 100 mg tablet   Yes Yes   Sig: Take 25 mg by mouth 2 (two) times a day      Facility-Administered Medications: None       Past Medical History:   Diagnosis Date    Migraine        History reviewed  No pertinent surgical history  History reviewed  No pertinent family history  I have reviewed and agree with the history as documented  Social History   Substance Use Topics    Smoking status: Never Smoker    Smokeless tobacco: Never Used    Alcohol use No        Review of Systems   Constitutional: Positive for appetite change  Negative for chills and fever  HENT: Negative for rhinorrhea and sore throat  Eyes: Negative for visual disturbance  Matilda Ray is a 14 month old female presenting to the walk-in clinic today for cough and pulling at ears X 4 days.       Swabs/Specimens collected during rooming process:    COVID/FLU/RSV - rapid    PPE worn during room process  Writer: Mask, eye protection, gown, gloves    Patient would like communication of their results via:     Cell Phone:   Telephone Information:   Mobile [phone]     Okay to leave a message containing results? Yes   Respiratory: Negative for cough and shortness of breath  Cardiovascular: Negative for chest pain and leg swelling  Gastrointestinal: Positive for nausea  Negative for abdominal pain, diarrhea and vomiting  Genitourinary: Negative for dysuria  Musculoskeletal: Negative for back pain and myalgias  Skin: Negative for rash  Neurological: Positive for dizziness, syncope and headaches  Psychiatric/Behavioral: Negative for confusion  All other systems reviewed and are negative  Physical Exam  Physical Exam   Constitutional: She is oriented to person, place, and time  She appears well-developed and well-nourished  HENT:   Mouth/Throat: No oropharyngeal exudate  Dry mucosa   Eyes: Pupils are equal, round, and reactive to light  Conjunctivae and EOM are normal  No scleral icterus  Neck: Normal range of motion  Neck supple  No JVD present  No tracheal deviation present  Cardiovascular: Normal rate, regular rhythm and normal heart sounds  No murmur heard  Pulmonary/Chest: Effort normal and breath sounds normal  No respiratory distress  She has no wheezes  She has no rales  Abdominal: Soft  Bowel sounds are normal  There is no tenderness  There is no guarding  Musculoskeletal: Normal range of motion  She exhibits no edema or tenderness  Neurological: She is alert and oriented to person, place, and time  No cranial nerve deficit or sensory deficit  She exhibits normal muscle tone  5/5 motor, nl sens   Skin: Skin is warm and dry  There is pallor  Psychiatric: She has a normal mood and affect  Her behavior is normal    Nursing note and vitals reviewed        Vital Signs  ED Triage Vitals [11/06/18 2207]   Temperature Pulse Resp Blood Pressure SpO2   98 3 °F (36 8 °C) 79 -- (!) 124/74 95 %      Temp src Heart Rate Source Patient Position - Orthostatic VS BP Location FiO2 (%)   Oral Monitor Sitting Left arm --      Pain Score       2           Vitals:    11/06/18 2207   BP: (!) 124/74 Pulse: 79   Patient Position - Orthostatic VS: Sitting       Visual Acuity      ED Medications  Medications   diphenhydrAMINE (BENADRYL) oral elixir 25 mg (not administered)   oxymetazoline (AFRIN) 0 05 % nasal spray 2 spray (not administered)       Diagnostic Studies  Results Reviewed     None                 No orders to display              Procedures  ECG 12 Lead Documentation  Date/Time: 11/6/2018 11:17 PM  Performed by: Marizol Reeder  Authorized by: Marizol Reeder     Indications / Diagnosis:  Syncope  ECG reviewed by me, the ED Provider: yes    Patient location:  ED  Previous ECG:     Previous ECG:  Unavailable    Comparison to cardiac monitor: No    Interpretation:     Interpretation: normal    Rate:     ECG rate assessment: normal    Rhythm:     Rhythm: sinus rhythm    Ectopy:     Ectopy: none    QRS:     QRS axis:  Normal  Conduction:     Conduction: normal    ST segments:     ST segments:  Normal  T waves:     T waves: normal             Phone Contacts  ED Phone Contact    ED Course  ED Course as of Nov 07 0241   Tue Nov 06, 2018   2341 Patient feeling better after Toradol and IV fluids  Workup essentially unremarkable  Discussed case with both mother and patient  Will be discharged  Likely viral related illness  [facility]  [facility]    Disposition  Final diagnoses:   None     ED Disposition     None      Follow-up Information    None         Patient's Medications   Discharge Prescriptions    No medications on file     No discharge procedures on file      ED Provider  Electronically Signed by           Floresita Shah DO  11/07/18 9360

## 2023-01-19 ENCOUNTER — OFFICE VISIT (OUTPATIENT)
Dept: INTERNAL MEDICINE CLINIC | Facility: CLINIC | Age: 23
End: 2023-01-19

## 2023-01-19 ENCOUNTER — APPOINTMENT (OUTPATIENT)
Dept: LAB | Facility: CLINIC | Age: 23
End: 2023-01-19

## 2023-01-19 VITALS
HEIGHT: 68 IN | HEART RATE: 80 BPM | BODY MASS INDEX: 24.95 KG/M2 | DIASTOLIC BLOOD PRESSURE: 70 MMHG | SYSTOLIC BLOOD PRESSURE: 115 MMHG | OXYGEN SATURATION: 98 % | TEMPERATURE: 99 F | WEIGHT: 164.6 LBS

## 2023-01-19 DIAGNOSIS — F41.8 ANXIETY ABOUT HEALTH: ICD-10-CM

## 2023-01-19 DIAGNOSIS — R93.89 ABNORMAL CT OF THE CHEST: ICD-10-CM

## 2023-01-19 DIAGNOSIS — Z00.00 ANNUAL PHYSICAL EXAM: Primary | ICD-10-CM

## 2023-01-19 DIAGNOSIS — E83.42 HYPOMAGNESEMIA: ICD-10-CM

## 2023-01-19 DIAGNOSIS — Z98.890 HISTORY OF CARDIAC CATH: ICD-10-CM

## 2023-01-19 DIAGNOSIS — E87.6 HYPOKALEMIA: ICD-10-CM

## 2023-01-19 DIAGNOSIS — I31.9 MYOPERICARDITIS: ICD-10-CM

## 2023-01-19 DIAGNOSIS — Q21.12 PFO (PATENT FORAMEN OVALE): ICD-10-CM

## 2023-01-19 DIAGNOSIS — Z13.220 ENCOUNTER FOR SCREENING FOR LIPOID DISORDERS: ICD-10-CM

## 2023-01-19 DIAGNOSIS — J45.990 EXERCISE-INDUCED ASTHMA: ICD-10-CM

## 2023-01-19 DIAGNOSIS — E04.1 THYROID NODULE: ICD-10-CM

## 2023-01-19 DIAGNOSIS — I51.4 MYOCARDITIS, UNSPECIFIED CHRONICITY, UNSPECIFIED MYOCARDITIS TYPE (HCC): ICD-10-CM

## 2023-01-19 DIAGNOSIS — G43.009 MIGRAINE WITHOUT AURA AND WITHOUT STATUS MIGRAINOSUS, NOT INTRACTABLE: Chronic | ICD-10-CM

## 2023-01-19 DIAGNOSIS — E55.9 VITAMIN D DEFICIENCY: ICD-10-CM

## 2023-01-19 DIAGNOSIS — R93.89 ABNORMAL CT SCAN, NECK: ICD-10-CM

## 2023-01-19 DIAGNOSIS — Z00.00 ANNUAL PHYSICAL EXAM: ICD-10-CM

## 2023-01-19 DIAGNOSIS — R77.8 ELEVATED TROPONIN: ICD-10-CM

## 2023-01-19 DIAGNOSIS — Z92.89 H/O MRI OF HEART: ICD-10-CM

## 2023-01-19 DIAGNOSIS — Z92.89 HISTORY OF ECHOCARDIOGRAM: ICD-10-CM

## 2023-01-19 PROBLEM — R45.89 ANXIETY ABOUT HEALTH: Status: ACTIVE | Noted: 2019-07-10

## 2023-01-19 PROBLEM — R29.90 STROKE-LIKE SYMPTOMS: Status: RESOLVED | Noted: 2022-04-28 | Resolved: 2023-01-19

## 2023-01-19 PROBLEM — J45.20 MILD INTERMITTENT ASTHMA WITHOUT COMPLICATION: Status: ACTIVE | Noted: 2022-03-23

## 2023-01-19 LAB
25(OH)D3 SERPL-MCNC: 14.2 NG/ML (ref 30–100)
ALBUMIN SERPL BCP-MCNC: 4 G/DL (ref 3.5–5)
ALP SERPL-CCNC: 66 U/L (ref 46–116)
ALT SERPL W P-5'-P-CCNC: 28 U/L (ref 12–78)
ANION GAP SERPL CALCULATED.3IONS-SCNC: 5 MMOL/L (ref 4–13)
AST SERPL W P-5'-P-CCNC: 14 U/L (ref 5–45)
BASOPHILS # BLD AUTO: 0.05 THOUSANDS/ÂΜL (ref 0–0.1)
BASOPHILS NFR BLD AUTO: 1 % (ref 0–1)
BILIRUB SERPL-MCNC: 0.37 MG/DL (ref 0.2–1)
BUN SERPL-MCNC: 16 MG/DL (ref 5–25)
CALCIUM SERPL-MCNC: 9.2 MG/DL (ref 8.3–10.1)
CHLORIDE SERPL-SCNC: 112 MMOL/L (ref 96–108)
CHOLEST SERPL-MCNC: 155 MG/DL
CO2 SERPL-SCNC: 24 MMOL/L (ref 21–32)
CREAT SERPL-MCNC: 0.91 MG/DL (ref 0.6–1.3)
EOSINOPHIL # BLD AUTO: 0.06 THOUSAND/ÂΜL (ref 0–0.61)
EOSINOPHIL NFR BLD AUTO: 1 % (ref 0–6)
ERYTHROCYTE [DISTWIDTH] IN BLOOD BY AUTOMATED COUNT: 11.9 % (ref 11.6–15.1)
GFR SERPL CREATININE-BSD FRML MDRD: 91 ML/MIN/1.73SQ M
GLUCOSE P FAST SERPL-MCNC: 83 MG/DL (ref 65–99)
HCT VFR BLD AUTO: 45.5 % (ref 34.8–46.1)
HDLC SERPL-MCNC: 57 MG/DL
HGB BLD-MCNC: 14.5 G/DL (ref 11.5–15.4)
IMM GRANULOCYTES # BLD AUTO: 0.03 THOUSAND/UL (ref 0–0.2)
IMM GRANULOCYTES NFR BLD AUTO: 0 % (ref 0–2)
LDLC SERPL CALC-MCNC: 85 MG/DL (ref 0–100)
LYMPHOCYTES # BLD AUTO: 0.58 THOUSANDS/ÂΜL (ref 0.6–4.47)
LYMPHOCYTES NFR BLD AUTO: 7 % (ref 14–44)
MAGNESIUM SERPL-MCNC: 2 MG/DL (ref 1.6–2.6)
MCH RBC QN AUTO: 30 PG (ref 26.8–34.3)
MCHC RBC AUTO-ENTMCNC: 31.9 G/DL (ref 31.4–37.4)
MCV RBC AUTO: 94 FL (ref 82–98)
MONOCYTES # BLD AUTO: 0.9 THOUSAND/ÂΜL (ref 0.17–1.22)
MONOCYTES NFR BLD AUTO: 11 % (ref 4–12)
NEUTROPHILS # BLD AUTO: 6.5 THOUSANDS/ÂΜL (ref 1.85–7.62)
NEUTS SEG NFR BLD AUTO: 80 % (ref 43–75)
NONHDLC SERPL-MCNC: 98 MG/DL
NRBC BLD AUTO-RTO: 0 /100 WBCS
PLATELET # BLD AUTO: 277 THOUSANDS/UL (ref 149–390)
PMV BLD AUTO: 10.7 FL (ref 8.9–12.7)
POTASSIUM SERPL-SCNC: 4 MMOL/L (ref 3.5–5.3)
PROT SERPL-MCNC: 7.4 G/DL (ref 6.4–8.4)
RBC # BLD AUTO: 4.83 MILLION/UL (ref 3.81–5.12)
SODIUM SERPL-SCNC: 141 MMOL/L (ref 135–147)
T3FREE SERPL-MCNC: 2.49 PG/ML (ref 2.91–4.53)
T4 FREE SERPL-MCNC: 1 NG/DL (ref 0.78–1.33)
TRIGL SERPL-MCNC: 66 MG/DL
TSH SERPL DL<=0.05 MIU/L-ACNC: 0.9 UIU/ML (ref 0.45–4.5)
WBC # BLD AUTO: 8.12 THOUSAND/UL (ref 4.31–10.16)

## 2023-01-19 NOTE — ASSESSMENT & PLAN NOTE
• Lifestyle modification, diet and exercise discussed  • Medications discussed and refilled appropriately  • Labs discussed and ordered   • Hepatitis C screening discussed  • HIV screening discussed  • Depression screening performed  • Anxiety screening performed   • BMI discussed  • Cervical cancer screening discussed and ordered

## 2023-01-19 NOTE — ASSESSMENT & PLAN NOTE
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 by cardiac MRI, April 2022  • Asthma  • Anxiety  • History of COVID 19 infection, January 2022  • History of vasovagal syncope  • Family history of CAD     PLAN:  It was a pleasure to see Conchita Rodas in the office today for follow-up CV evaluation  She is here today due to her history of myocarditis  She has been taking her colchicine without any reported adverse effects  ECG is grossly normal without ischemic changes  Cardiac MRI was performed demonstrating patchy intra myocardial fibrosis of the anterolateral wall  She has no symptoms concerning for angina and no signs or symptoms of heart failure  She examines to be euvolemic in the office today  Blood pressure and heart rate are currently stable  Based on her clinical presentation, I have the following recommendations:     1  Due to her evidence of myocarditis noted on cardiac MRI, I have recommended completion of 3 months of colchicine and avoiding high-intensity activity for 3 month duration  2  At the end of 3 months, we will check an exercise stress test for completeness prior to her returning to activity  3  No changes to her medications at this time  4  Should she have recurrence of her symptoms, especially worsening in frequency or severity or change in quality, recommend seeking immediate medical attention  5  We will follow up with her in 2 months  · 4/25/2022 Cardiology Second Opinion:  I had a lengthily discussion with Conchita Rodas and her mother today, told her I agree with Dr Esme Moncada stance on activity restriction    I tried to stress the significance of her abnormal findings in the way of biochemical evidence and MRI evidence of myocardial damage here  At her age I feel the risk alone outweighs the benefit of resuming competitive sports or intermediate to high levels of aerobic activity at this point in time  I told her the risk of arrhythmias significant      I also feel the benefit outweighs the potential risk of colchicine for a total of 3 months time therapy here      I told her I would not pursue any further investigation of her PFO, it seems quite small, and unless her migraines would become crippling would not investigate or entertain closure, even then I might pause at such      I told her there are many positives here, her EF is normal, she had no thrombotic/embolic events, and we know her coronaries are normal which is not a surprise, but all her valves are also normal   She was happy to hear all this information      Despite being sad Dima Brewer seems to be coping quite well, understandably upset that I provided similar recommendations to her today  I did briefly entertain that we could repeat a cardiac MRI 6 weeks after the 1st and if normal an exercise tolerance test normal without ectopy I would clear her, but she said this would probably be too late      For these reasons for now she is going to continue colchicine, withhold from strenuous activity and competitive sports, and we will order a stress echocardiogram in late June to hopefully clear her for full activity without restriction at that time  She is considering some summer sports      Patient and mother requested a note that they can provide to school as she has gotten behind on some school work  They both gave me verbal permission to disclose her medical illness in said letter, particularly COVID and that she had cardiac complications from such      As long as the stress echo at the end of June is normal she will be cleared without restriction for all activity and can follow-up here p r n  Only      · 4/28/2022 to 4/29/2022 hospitalized for SOB: Cardiology:   Cardiac MRI showing Myocarditis in 3/2022  Patient on Colchicine therapy for three months  HS troponin levels have normalized   Repeat inflammatory markers are normal     Planned for exercise stress testing in June     · 9/19/2022 Cardiology  · Scheduled follow up  · No new issues

## 2023-01-19 NOTE — ASSESSMENT & PLAN NOTE
· 4/28/2022 CTA PE Study  Chest:  FINDINGS:   PULMONARY ARTERIAL TREE:  The present study is limited by motion artifact, streak artifact (largely related to the patient's arms, which were at her sides for the study), and the timing of contrast administration  The contrast material within the main pulmonary artery measures 300 Hounsfield units whereas the ascending thoracic aorta measures 555 Hounsfield units  Within these limitations, there is no evidence of large central pulmonary embolism  Evaluation of the segmental and subsegmental branches  is limited    LUNGS:  Minimal dependent changes are present  There is no focal consolidation  There is no pneumothorax    PLEURA:  Unremarkable    HEART/GREAT VESSELS:  No pericardial effusion  No thoracic aortic aneurysm    MEDIASTINUM AND MIKEY:  Residual thymic tissue in the anterior mediastinum, similar to the prior study    CHEST WALL AND LOWER NECK: There is a 6 mm nodule within the posterior aspect of the right lobe of the thyroid  Incidental discovery of one or more thyroid nodule(s) measuring less than 1 cm and without suspicious features is noted in this patient who is younger than 28years old; according to guidelines published in the February 2015 white paper on incidental thyroid nodules in the Journal of the [employer] of Radiology [facility]), no further evaluation is recommended     VISUALIZED STRUCTURES IN THE UPPER ABDOMEN:  Unremarkable    OSSEOUS STRUCTURES:  No acute fracture or destructive osseous lesion    IMPRESSION:   Extremely technically limited study  No evidence of large central pulmonary embolism  Evaluation of the segmental and subsegmental branches is limited  Clinical correlation is necessary  If there is persistent concern for pulmonary embolism, repeat imaging or nuclear medicine ventilation/perfusion scanning could be performed, if there are no contraindications    There is a 6 mm nodule within the right lobe of the thyroid  Please see above discussion/recommendations/guidelines

## 2023-01-19 NOTE — ASSESSMENT & PLAN NOTE
· 4/28/2022 CTA Head and Neck:  FINDINGS:  NONCONTRAST BRAIN  PARENCHYMA:  No intracranial mass, mass effect or midline shift  No CT signs of acute infarction  No acute parenchymal hemorrhage    VENTRICLES AND EXTRA-AXIAL SPACES:  Normal for the patient's age    VISUALIZED ORBITS AND PARANASAL SINUSES:  Unremarkable    CERVICAL VASCULATURE  AORTIC ARCH AND GREAT VESSELS:  Normal aortic arch and great vessel origins  Normal visualized subclavian vessels    RIGHT VERTEBRAL ARTERY CERVICAL SEGMENT:  Normal origin  The vessel is normal in caliber throughout the neck    LEFT VERTEBRAL ARTERY CERVICAL SEGMENT:  Normal origin  The vessel is normal in caliber throughout the neck    RIGHT EXTRACRANIAL CAROTID SEGMENT:  Normal caliber common carotid artery  Normal bifurcation and cervical internal carotid artery  No stenosis or dissecion    LEFT EXTRACRANIAL CAROTID SEGMENT:  Normal caliber common carotid artery  Normal bifurcation and cervical internal carotid artery  No stenosis or dissection    NASCET criteria was used to determine the degree of internal carotid artery diameter stenosis    INTRACRANIAL VASCULATURE   INTERNAL CAROTID ARTERIES:  Normal enhancement of the intracranial portions of the internal carotid arteries  Normal ophthalmic artery origins  Normal ICA terminus     ANTERIOR CIRCULATION:  Symmetric A1 segments and anterior cerebral arteries with normal enhancement  Normal anterior communicating artery    MIDDLE CEREBRAL ARTERY CIRCULATION:  M1 segment and middle cerebral artery branches demonstrate normal enhancement bilaterally    DISTAL VERTEBRAL ARTERIES:  Normal distal vertebral arteries  Posterior inferior cerebellar artery origins are normal  Normal vertebral basilar junction    BASILAR ARTERY:  Basilar artery is normal in caliber  Normal superior cerebellar arteries    POSTERIOR CEREBRAL ARTERIES: Both posterior cerebral arteries arises from the basilar tip    Both arteries demonstrate normal enhancement  Normal posterior communicating arteries    VENOUS STRUCTURES:  Normal    NON VASCULAR ANATOMY  BONY STRUCTURES:  No acute osseous abnormality    SOFT TISSUES OF THE NECK: There is a 6 mm nodule within the posterior aspect of the right lobe of the thyroid  Incidental discovery of one or more thyroid nodule(s) measuring less than 1 cm and without suspicious features is noted in this patient who is  younger than 28years old; according to guidelines published in the February 2015 white paper on incidental thyroid nodules in the Journal of the [employer] of Radiology [facility]), no further evaluation is recommended     THORACIC INLET:  Normal    IMPRESSION:   No evidence of acute intracranial abnormality    No large vessel flow restrictive disease within the head or neck    There is a 6 mm nodule within the right lobe of the thyroid  Please see above discussion /recommendations/guidelines      · 1/19/2023  · Referral to ENT  · Check thyroid labs  · Will order a thyroid ultrasound

## 2023-01-19 NOTE — ASSESSMENT & PLAN NOTE
· 4/28/2022 CTA Head and Neck:  FINDINGS:  NONCONTRAST BRAIN  PARENCHYMA:  No intracranial mass, mass effect or midline shift  No CT signs of acute infarction  No acute parenchymal hemorrhage    VENTRICLES AND EXTRA-AXIAL SPACES:  Normal for the patient's age    VISUALIZED ORBITS AND PARANASAL SINUSES:  Unremarkable    CERVICAL VASCULATURE  AORTIC ARCH AND GREAT VESSELS:  Normal aortic arch and great vessel origins  Normal visualized subclavian vessels    RIGHT VERTEBRAL ARTERY CERVICAL SEGMENT:  Normal origin  The vessel is normal in caliber throughout the neck    LEFT VERTEBRAL ARTERY CERVICAL SEGMENT:  Normal origin  The vessel is normal in caliber throughout the neck    RIGHT EXTRACRANIAL CAROTID SEGMENT:  Normal caliber common carotid artery  Normal bifurcation and cervical internal carotid artery  No stenosis or dissecion    LEFT EXTRACRANIAL CAROTID SEGMENT:  Normal caliber common carotid artery  Normal bifurcation and cervical internal carotid artery  No stenosis or dissection    NASCET criteria was used to determine the degree of internal carotid artery diameter stenosis    INTRACRANIAL VASCULATURE   INTERNAL CAROTID ARTERIES:  Normal enhancement of the intracranial portions of the internal carotid arteries  Normal ophthalmic artery origins  Normal ICA terminus     ANTERIOR CIRCULATION:  Symmetric A1 segments and anterior cerebral arteries with normal enhancement  Normal anterior communicating artery    MIDDLE CEREBRAL ARTERY CIRCULATION:  M1 segment and middle cerebral artery branches demonstrate normal enhancement bilaterally    DISTAL VERTEBRAL ARTERIES:  Normal distal vertebral arteries  Posterior inferior cerebellar artery origins are normal  Normal vertebral basilar junction    BASILAR ARTERY:  Basilar artery is normal in caliber  Normal superior cerebellar arteries    POSTERIOR CEREBRAL ARTERIES: Both posterior cerebral arteries arises from the basilar tip    Both arteries demonstrate normal enhancement  Normal posterior communicating arteries    VENOUS STRUCTURES:  Normal    NON VASCULAR ANATOMY  BONY STRUCTURES:  No acute osseous abnormality    SOFT TISSUES OF THE NECK: There is a 6 mm nodule within the posterior aspect of the right lobe of the thyroid  Incidental discovery of one or more thyroid nodule(s) measuring less than 1 cm and without suspicious features is noted in this patient who is  younger than 28years old; according to guidelines published in the February 2015 white paper on incidental thyroid nodules in the Journal of the [address] of Radiology [facility]), no further evaluation is recommended     THORACIC INLET:  Normal    IMPRESSION:   No evidence of acute intracranial abnormality    No large vessel flow restrictive disease within the head or neck    There is a 6 mm nodule within the right lobe of the thyroid  Please see above discussion /recommendations/guidelines

## 2023-01-19 NOTE — ASSESSMENT & PLAN NOTE
· 35/2022  23year old woman for evaluation for myopericarditis      Technique:  1  3 plane SSFP localizers  2  SSFP cine imaging in long and short axis plane  3  T2 weighted DIR FSE in short axis plane  4  14 ml gadobutrol power injected  5  2D inversion recovery FGRE for delayed myocardial enhancement  6  The patient tolerated the procedure well without complication      Measurements:   Az-septal wall 7 mm  Postero-lateral wall 6 mm  Left ventricular end-diastolic dimension 54 mm  Left ventricular end-systolic dimension 40 mm  Left ventricular end-diastolic volume 377 ml  Left ventricular end-systolic volume  54 ml  Stroke volume 77 ml  Cardiac Output 4 2 L/min  Ejection fraction 59 %  Left atrium 29 mm  Aortic Root 24 mm     Findings:    1  The left ventricle is normal in size with normal wall thickness  Left ventricular systolic function is normal with a measured ejection fraction of 59%  There are no regional left ventricular wall motion abnormalities  2  The right ventricle is normal in size with normal right ventricular systolic function  3  The aortic, mitral, and tricuspid valves open without restriction  There is no significant valvular regurgitation, however cine MRI is inaccurate in the qualitative assessment of valvular regurgitation  4  The left atrium is normal in size  The aortic root is normal in size  5  There is no evidence of myocardial edema  6  Delayed post-gadolinium imaging demonstrates patchy intramyocardial hyperenhancement in the mid anterolateral wall      IMPRESSION:  Impression:  1  Normal left and right ventricular size and systolic function  2  No significant valvular abnormalities  3  Patchy intramyocardial fibrosis in the mid anterolateral wall  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

## 2023-01-19 NOTE — ASSESSMENT & PLAN NOTE
· 1/19/2023  · Her anxiety has been related to her previous health issues that have been discussed   · The patient is stable   · She is negative for anxiety and depression  · She does take topamax, but this is for her diagnosis of migraines  · She is currently controlled on her topamax

## 2023-01-19 NOTE — ASSESSMENT & PLAN NOTE
· 1/19/2023   · Review of patient's chart:  · Migraines began around age 17 (2012)  · Bitemporal, mid frontal pressure, with nausea, photophobia, phonophobia, decreased concentration, osmophobia, significant lightheadedness  · Started on amitriptyline 10mg, then stopped  · Started on fluoxetine 10mg then stopped  · Restarted on amitriptyline 10mg and titrated up to 50mg  · Started on maxalt 10mg prn  · Has follows [facility] on:  · 7/24/2015, 1/9/2019, 3/13/2019, 7/10/2019, 11/6/2019  · On 7/10/2019 started on Indomethacin 50mg  · 1/19/2023  · No longer taking amitriptyline, maxalt or indomethacin  · Currently taking topamax 100mg twice a day with controlled headaches

## 2023-01-19 NOTE — ASSESSMENT & PLAN NOTE
· 3/23/2022  Interpretation Summary       •  Left Ventricle: Left ventricular cavity size is normal  Wall thickness is normal  The left ventricular ejection fraction is 50%  Systolic function is low normal  Although no diagnostic regional wall motion abnormality was identified, this possibility cannot be completely excluded on the basis of this study  Diastolic function is normal   •  Right Ventricle: Right ventricular cavity size is mildly dilated  Systolic function is low normal   •  Tricuspid Valve: There is mild regurgitation  •  Pericardium: There is no pericardial effusion  The pericardium is normal in appearance  · 3/25/2022  Interpretation Summary       •  Left Ventricle: Left ventricular cavity size is normal  Wall thickness is normal  The left ventricular ejection fraction is 55% by visual estimation  Systolic function is normal  Wall motion is normal  Diastolic function is normal   •  Right Ventricle: Right ventricular cavity size is top normal in size  Systolic function is normal   •  Atrial Septum: There is evidence of left-to-right shunting by color-flow Doppler consistent with small moderate sized patent foramen ovale  •  Mitral Valve: There is trace regurgitation  •  Tricuspid Valve: There is trace regurgitation  Pulmonary artery systolic pressures are estimated at 18 mm Hg  •  Compared to report from March 23, 2022, there is mild improvement in left ventricular function  · 4/28/2022  Interpretation Summary       •  Left Ventricle: Left ventricular cavity size is normal  Wall thickness is normal  The left ventricular ejection fraction is 50-55%   Systolic function is low normal  Wall motion is normal  Diastolic function is normal

## 2023-01-19 NOTE — ASSESSMENT & PLAN NOTE
· 3/23/2022 to 3/25/2022  • Possibly related to recent COVID infection/viral  • Medically stable  • Continue colchicine b i d  For 3 months  • Outpatient follow-up with cardiology as scheduled on April 8  • Patient was advised by Cardiology not to undergo strenuous activities for 3 months    · 4/8/2022 Cardiology:  IMPRESSION:  • Myopericarditis   • Normal coronary arteries by cardiac catheterization, March 2022  • LVEF 43%, normal diastolic function, small to moderate PFO with left-to-right shunting, trace MR/TR w/ PASP 18 mmHg by 2D echo, March 2022  • 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 by cardiac MRI, April 2022  • Asthma  • Anxiety  • History of COVID 19 infection, January 2022  • History of vasovagal syncope  • Family history of CAD     PLAN:  It was a pleasure to see Man Bernardo in the office today for follow-up CV evaluation  She is here today due to her history of myocarditis  She has been taking her colchicine without any reported adverse effects  ECG is grossly normal without ischemic changes  Cardiac MRI was performed demonstrating patchy intra myocardial fibrosis of the anterolateral wall  She has no symptoms concerning for angina and no signs or symptoms of heart failure  She examines to be euvolemic in the office today  Blood pressure and heart rate are currently stable  Based on her clinical presentation, I have the following recommendations:     1  Due to her evidence of myocarditis noted on cardiac MRI, I have recommended completion of 3 months of colchicine and avoiding high-intensity activity for 3 month duration  2  At the end of 3 months, we will check an exercise stress test for completeness prior to her returning to activity  3  No changes to her medications at this time  4  Should she have recurrence of her symptoms, especially worsening in frequency or severity or change in quality, recommend seeking immediate medical attention  5  We will follow up with her in 2 months  · 4/25/2022 Cardiology Second Opinion:  I had a lengthily discussion with Albert Peña and her mother today, told her I agree with Dr Syed Fitzgerald stance on activity restriction  I tried to stress the significance of her abnormal findings in the way of biochemical evidence and MRI evidence of myocardial damage here  At her age I feel the risk alone outweighs the benefit of resuming competitive sports or intermediate to high levels of aerobic activity at this point in time  I told her the risk of arrhythmias significant      I also feel the benefit outweighs the potential risk of colchicine for a total of 3 months time therapy here      I told her I would not pursue any further investigation of her PFO, it seems quite small, and unless her migraines would become crippling would not investigate or entertain closure, even then I might pause at such      I told her there are many positives here, her EF is normal, she had no thrombotic/embolic events, and we know her coronaries are normal which is not a surprise, but all her valves are also normal   She was happy to hear all this information      Despite being sad Albert Peña seems to be coping quite well, understandably upset that I provided similar recommendations to her today  I did briefly entertain that we could repeat a cardiac MRI 6 weeks after the 1st and if normal an exercise tolerance test normal without ectopy I would clear her, but she said this would probably be too late      For these reasons for now she is going to continue colchicine, withhold from strenuous activity and competitive sports, and we will order a stress echocardiogram in late June to hopefully clear her for full activity without restriction at that time  She is considering some summer sports      Patient and mother requested a note that they can provide to school as she has gotten behind on some school work    They both gave me verbal permission to disclose her medical illness in said letter, particularly COVID and that she had cardiac complications from such      As long as the stress echo at the end of June is normal she will be cleared without restriction for all activity and can follow-up here p r n  Only      · 4/28/2022 to 4/29/2022 hospitalized for SOB: Cardiology:   Cardiac MRI showing Myocarditis in 3/2022  Patient on Colchicine therapy for three months  HS troponin levels have normalized   Repeat inflammatory markers are normal     Planned for exercise stress testing in June     · 9/19/2022 Cardiology  · Scheduled follow up  · No new issues

## 2023-01-19 NOTE — ASSESSMENT & PLAN NOTE
· 3/24/2022  · Cardiac cath performed via the R radial artery  Left Main   The vessel was visualized by angiography and is angiographically normal       Left Anterior Descending   The vessel was visualized by angiography and is angiographically normal       Left Circumflex   The vessel was visualized by angiography and is angiographically normal       Right Coronary Artery   The vessel was visualized by angiography, is angiographically normal and dominant  Intervention     No interventions have been documented

## 2023-01-19 NOTE — PATIENT INSTRUCTIONS
Problem List Items Addressed This Visit          Endocrine    Thyroid nodule     4/28/2022 CTA Head and Neck:  FINDINGS:  NONCONTRAST BRAIN  PARENCHYMA:  No intracranial mass, mass effect or midline shift  No CT signs of acute infarction  No acute parenchymal hemorrhage  VENTRICLES AND EXTRA-AXIAL SPACES:  Normal for the patient's age  VISUALIZED ORBITS AND PARANASAL SINUSES:  Unremarkable  CERVICAL VASCULATURE  AORTIC ARCH AND GREAT VESSELS:  Normal aortic arch and great vessel origins  Normal visualized subclavian vessels  RIGHT VERTEBRAL ARTERY CERVICAL SEGMENT:  Normal origin  The vessel is normal in caliber throughout the neck  LEFT VERTEBRAL ARTERY CERVICAL SEGMENT:  Normal origin  The vessel is normal in caliber throughout the neck  RIGHT EXTRACRANIAL CAROTID SEGMENT:  Normal caliber common carotid artery  Normal bifurcation and cervical internal carotid artery  No stenosis or dissecion  LEFT EXTRACRANIAL CAROTID SEGMENT:  Normal caliber common carotid artery  Normal bifurcation and cervical internal carotid artery  No stenosis or dissection  NASCET criteria was used to determine the degree of internal carotid artery diameter stenosis  INTRACRANIAL VASCULATURE   INTERNAL CAROTID ARTERIES:  Normal enhancement of the intracranial portions of the internal carotid arteries  Normal ophthalmic artery origins  Normal ICA terminus  ANTERIOR CIRCULATION:  Symmetric A1 segments and anterior cerebral arteries with normal enhancement  Normal anterior communicating artery  MIDDLE CEREBRAL ARTERY CIRCULATION:  M1 segment and middle cerebral artery branches demonstrate normal enhancement bilaterally  DISTAL VERTEBRAL ARTERIES:  Normal distal vertebral arteries  Posterior inferior cerebellar artery origins are normal  Normal vertebral basilar junction  BASILAR ARTERY:  Basilar artery is normal in caliber  Normal superior cerebellar arteries     POSTERIOR CEREBRAL ARTERIES: Both posterior cerebral arteries arises from the basilar tip  Both arteries demonstrate normal enhancement  Normal posterior communicating arteries  VENOUS STRUCTURES:  Normal    NON VASCULAR ANATOMY  BONY STRUCTURES:  No acute osseous abnormality  SOFT TISSUES OF THE NECK: There is a 6 mm nodule within the posterior aspect of the right lobe of the thyroid  Incidental discovery of one or more thyroid nodule(s) measuring less than 1 cm and without suspicious features is noted in this patient who is  younger than 28years old; according to guidelines published in the February 2015 white paper on incidental thyroid nodules in the Journal of the [employer] [place]), no further evaluation is recommended  THORACIC INLET:  Normal    IMPRESSION:   No evidence of acute intracranial abnormality  No large vessel flow restrictive disease within the head or neck  There is a 6 mm nodule within the right lobe of the thyroid  Please see above discussion /recommendations/guidelines      1/19/2023  Referral to ENT  Check thyroid labs  Will order a thyroid ultrasound         Relevant Orders    TSH, 3rd generation    T4, free    T3, free    Anti-thyroglobulin antibody    Thyroid Antibodies Panel    Ambulatory Referral to Otolaryngology    US thyroid       Respiratory    Exercise-induced asthma     1/19/2023  Stable  Continue albuterol inhaler   Will refer to pulmonary for PFT evaluation         Relevant Orders    Ambulatory Referral to Pulmonary Rehabilitation       Cardiovascular and Mediastinum    Migraine without aura and without status migrainosus, not intractable (Chronic)     1/19/2023   Review of patient's chart:  Migraines began around age 17 (2012)  Bitemporal, mid frontal pressure, with nausea, photophobia, phonophobia, decreased concentration, osmophobia, significant lightheadedness  Started on amitriptyline 10mg, then stopped  Started on fluoxetine 10mg then stopped  Restarted on amitriptyline 10mg and titrated up to 50mg  Started on maxalt 10mg prn  Has follows [facility] on:  7/24/2015, 1/9/2019, 3/13/2019, 7/10/2019, 11/6/2019  On 7/10/2019 started on Indomethacin 50mg  1/19/2023  No longer taking amitriptyline, maxalt or indomethacin  Currently taking topamax 100mg twice a day with controlled headaches  Myocarditis, unspecified chronicity, unspecified myocarditis type ([address] )     3/23/2022 to 3/25/2022  Possibly related to recent COVID infection/viral  Medically stable  Continue colchicine b i d  For 3 months  Outpatient follow-up with cardiology as scheduled on April 8  Patient was advised by Cardiology not to undergo strenuous activities for 3 months    4/8/2022 Cardiology:  IMPRESSION:  Myopericarditis   Normal coronary arteries by cardiac catheterization, March 2022  LVEF 65%, normal diastolic function, small to moderate PFO with left-to-right shunting, trace MR/TR w/ PASP 18 mmHg by 2D echo, March 2022  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 by cardiac MRI, April 2022  Asthma  Anxiety  History of COVID 19 infection, January 2022  History of vasovagal syncope  Family history of CAD     PLAN:  It was a pleasure to see Greg Olson in the office today for follow-up CV evaluation  She is here today due to her history of myocarditis  She has been taking her colchicine without any reported adverse effects  ECG is grossly normal without ischemic changes  Cardiac MRI was performed demonstrating patchy intra myocardial fibrosis of the anterolateral wall  She has no symptoms concerning for angina and no signs or symptoms of heart failure  She examines to be euvolemic in the office today  Blood pressure and heart rate are currently stable  Based on her clinical presentation, I have the following recommendations:     1   Due to her evidence of myocarditis noted on cardiac MRI, I have recommended completion of 3 months of colchicine and avoiding high-intensity activity for 3 month duration  2  At the end of 3 months, we will check an exercise stress test for completeness prior to her returning to activity  3  No changes to her medications at this time  4  Should she have recurrence of her symptoms, especially worsening in frequency or severity or change in quality, recommend seeking immediate medical attention  5  We will follow up with her in 2 months     4/25/2022 Cardiology Second Opinion:  I had a lengthily discussion with Marry Calderon and her mother today, told her I agree with Dr Giovanna Sky stance on activity restriction  I tried to stress the significance of her abnormal findings in the way of biochemical evidence and MRI evidence of myocardial damage here  At her age I feel the risk alone outweighs the benefit of resuming competitive sports or intermediate to high levels of aerobic activity at this point in time  I told her the risk of arrhythmias significant  I also feel the benefit outweighs the potential risk of colchicine for a total of 3 months time therapy here  I told her I would not pursue any further investigation of her PFO, it seems quite small, and unless her migraines would become crippling would not investigate or entertain closure, even then I might pause at such  I told her there are many positives here, her EF is normal, she had no thrombotic/embolic events, and we know her coronaries are normal which is not a surprise, but all her valves are also normal   She was happy to hear all this information  Despite being sad Marry Calderon seems to be coping quite well, understandably upset that I provided similar recommendations to her today  I did briefly entertain that we could repeat a cardiac MRI 6 weeks after the 1st and if normal an exercise tolerance test normal without ectopy I would clear her, but she said this would probably be too late       For these reasons for now she is going to continue colchicine, withhold from strenuous activity and competitive sports, and we will order a stress echocardiogram in late June to hopefully clear her for full activity without restriction at that time  She is considering some summer sports  Patient and mother requested a note that they can provide to school as she has gotten behind on some school work  They both gave me verbal permission to disclose her medical illness in said letter, particularly COVID and that she had cardiac complications from such  As long as the stress echo at the end of June is normal she will be cleared without restriction for all activity and can follow-up here p r n  Only  4/28/2022 to 4/29/2022 hospitalized for SOB: Cardiology:   Cardiac MRI showing Myocarditis in 3/2022  Patient on Colchicine therapy for three months  HS troponin levels have normalized   Repeat inflammatory markers are normal     Planned for exercise stress testing in June 9/19/2022 Cardiology  Scheduled follow up  No new issues            PFO (patent foramen ovale)     4/8/2022 Cardiology  IMPRESSION:  Myopericarditis   Normal coronary arteries by cardiac catheterization, March 2022  LVEF 85%, normal diastolic function, small to moderate PFO with left-to-right shunting, trace MR/TR w/ PASP 18 mmHg by 2D echo, March 2022  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 by cardiac MRI, April 2022  Asthma  Anxiety  History of COVID 19 infection, January 2022  History of vasovagal syncope  Family history of CAD     PLAN:  It was a pleasure to see Greg Olson in the office today for follow-up CV evaluation  She is here today due to her history of myocarditis  She has been taking her colchicine without any reported adverse effects  ECG is grossly normal without ischemic changes  Cardiac MRI was performed demonstrating patchy intra myocardial fibrosis of the anterolateral wall    She has no symptoms concerning for angina and no signs or symptoms of heart failure  She examines to be euvolemic in the office today  Blood pressure and heart rate are currently stable  Based on her clinical presentation, I have the following recommendations:     1  Due to her evidence of myocarditis noted on cardiac MRI, I have recommended completion of 3 months of colchicine and avoiding high-intensity activity for 3 month duration  2  At the end of 3 months, we will check an exercise stress test for completeness prior to her returning to activity  3  No changes to her medications at this time  4  Should she have recurrence of her symptoms, especially worsening in frequency or severity or change in quality, recommend seeking immediate medical attention  5  We will follow up with her in 2 months     4/25/2022 Cardiology Second Opinion:  I had a lengthily discussion with Eveline Drew and her mother today, told her I agree with Dr Latoya Kellogg stance on activity restriction  I tried to stress the significance of her abnormal findings in the way of biochemical evidence and MRI evidence of myocardial damage here  At her age I feel the risk alone outweighs the benefit of resuming competitive sports or intermediate to high levels of aerobic activity at this point in time  I told her the risk of arrhythmias significant  I also feel the benefit outweighs the potential risk of colchicine for a total of 3 months time therapy here  I told her I would not pursue any further investigation of her PFO, it seems quite small, and unless her migraines would become crippling would not investigate or entertain closure, even then I might pause at such  I told her there are many positives here, her EF is normal, she had no thrombotic/embolic events, and we know her coronaries are normal which is not a surprise, but all her valves are also normal   She was happy to hear all this information       Despite being sad Eveline Drew seems to be coping quite well, understandably upset that I provided similar recommendations to her today  I did briefly entertain that we could repeat a cardiac MRI 6 weeks after the 1st and if normal an exercise tolerance test normal without ectopy I would clear her, but she said this would probably be too late  For these reasons for now she is going to continue colchicine, withhold from strenuous activity and competitive sports, and we will order a stress echocardiogram in late June to hopefully clear her for full activity without restriction at that time  She is considering some summer sports  Patient and mother requested a note that they can provide to school as she has gotten behind on some school work  They both gave me verbal permission to disclose her medical illness in said letter, particularly COVID and that she had cardiac complications from such  As long as the stress echo at the end of June is normal she will be cleared without restriction for all activity and can follow-up here p r n  Only  4/28/2022 to 4/29/2022 hospitalized for SOB: Cardiology:   Cardiac MRI showing Myocarditis in 3/2022  Patient on Colchicine therapy for three months  HS troponin levels have normalized   Repeat inflammatory markers are normal     Planned for exercise stress testing in June 9/19/2022 Cardiology  Scheduled follow up  No new issues               Other    Anxiety about health     1/19/2023  Her anxiety has been related to her previous health issues that have been discussed   The patient is stable   She is negative for anxiety and depression  She does take topamax, but this is for her diagnosis of migraines  She is currently controlled on her topamax           Elevated troponin     3/23/2022 to 3/25/2022  Troponins: 31, 1051, 2433, 3035, 3866           History of echocardiogram     3/23/2022  Interpretation Summary         Left Ventricle: Left ventricular cavity size is normal  Wall thickness is normal  The left ventricular ejection fraction is 50%   Systolic function is low normal  Although no diagnostic regional wall motion abnormality was identified, this possibility cannot be completely excluded on the basis of this study  Diastolic function is normal     Right Ventricle: Right ventricular cavity size is mildly dilated  Systolic function is low normal     Tricuspid Valve: There is mild regurgitation  Pericardium: There is no pericardial effusion  The pericardium is normal in appearance  3/25/2022  Interpretation Summary         Left Ventricle: Left ventricular cavity size is normal  Wall thickness is normal  The left ventricular ejection fraction is 55% by visual estimation  Systolic function is normal  Wall motion is normal  Diastolic function is normal     Right Ventricle: Right ventricular cavity size is top normal in size  Systolic function is normal     Atrial Septum: There is evidence of left-to-right shunting by color-flow Doppler consistent with small moderate sized patent foramen ovale  Mitral Valve: There is trace regurgitation  Tricuspid Valve: There is trace regurgitation  Pulmonary artery systolic pressures are estimated at 18 mm Hg  Compared to report from March 23, 2022, there is mild improvement in left ventricular function  4/28/2022  Interpretation Summary         Left Ventricle: Left ventricular cavity size is normal  Wall thickness is normal  The left ventricular ejection fraction is 50-55%  Systolic function is low normal  Wall motion is normal  Diastolic function is normal            History of cardiac cath     3/24/2022  Cardiac cath performed via the R radial artery    Left Main   The vessel was visualized by angiography and is angiographically normal       Left Anterior Descending   The vessel was visualized by angiography and is angiographically normal       Left Circumflex   The vessel was visualized by angiography and is angiographically normal       Right Coronary Artery   The vessel was visualized by angiography, is angiographically normal and dominant  Intervention     No interventions have been documented  H/O MRI of heart     35/2022  23year old woman for evaluation for myopericarditis  Technique:  1  3 plane SSFP localizers  2  SSFP cine imaging in long and short axis plane  3  T2 weighted DIR FSE in short axis plane  4  14 ml gadobutrol power injected  5  2D inversion recovery FGRE for delayed myocardial enhancement  6  The patient tolerated the procedure well without complication  Measurements:   Az-septal wall 7 mm  Postero-lateral wall 6 mm  Left ventricular end-diastolic dimension 54 mm  Left ventricular end-systolic dimension 40 mm  Left ventricular end-diastolic volume 426 ml  Left ventricular end-systolic volume  54 ml  Stroke volume 77 ml  Cardiac Output 4 2 L/min  Ejection fraction 59 %  Left atrium 29 mm  Aortic Root 24 mm     Findings:    1  The left ventricle is normal in size with normal wall thickness  Left ventricular systolic function is normal with a measured ejection fraction of 59%  There are no regional left ventricular wall motion abnormalities  2  The right ventricle is normal in size with normal right ventricular systolic function  3  The aortic, mitral, and tricuspid valves open without restriction  There is no significant valvular regurgitation, however cine MRI is inaccurate in the qualitative assessment of valvular regurgitation  4  The left atrium is normal in size  The aortic root is normal in size  5  There is no evidence of myocardial edema  6  Delayed post-gadolinium imaging demonstrates patchy intramyocardial hyperenhancement in the mid anterolateral wall  IMPRESSION:  Impression:  1  Normal left and right ventricular size and systolic function  2  No significant valvular abnormalities  3  Patchy intramyocardial fibrosis in the mid anterolateral wall  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    Other infiltrative processes such as cardiac sarcoidosis much less likely  Abnormal CT of the chest     4/28/2022 CTA PE Study  Chest:  FINDINGS:   PULMONARY ARTERIAL TREE:  The present study is limited by motion artifact, streak artifact (largely related to the patient's arms, which were at her sides for the study), and the timing of contrast administration  The contrast material within the main pulmonary artery measures 300 Hounsfield units whereas the ascending thoracic aorta measures 555 Hounsfield units  Within these limitations, there is no evidence of large central pulmonary embolism  Evaluation of the segmental and subsegmental branches  is limited  LUNGS:  Minimal dependent changes are present  There is no focal consolidation  There is no pneumothorax  PLEURA:  Unremarkable  HEART/GREAT VESSELS:  No pericardial effusion  No thoracic aortic aneurysm  MEDIASTINUM AND MIKEY:  Residual thymic tissue in the anterior mediastinum, similar to the prior study  CHEST WALL AND LOWER NECK: There is a 6 mm nodule within the posterior aspect of the right lobe of the thyroid  Incidental discovery of one or more thyroid nodule(s) measuring less than 1 cm and without suspicious features is noted in this patient who is younger than 28years old; according to guidelines published in the February 2015 white paper on incidental thyroid nodules in the Journal of the [employer] [place]), no further evaluation is recommended  VISUALIZED STRUCTURES IN THE UPPER ABDOMEN:  Unremarkable  OSSEOUS STRUCTURES:  No acute fracture or destructive osseous lesion  IMPRESSION:   Extremely technically limited study  No evidence of large central pulmonary embolism  Evaluation of the segmental and subsegmental branches is limited  Clinical correlation is necessary  If there is persistent concern for pulmonary embolism, repeat imaging or nuclear medicine ventilation/perfusion scanning could be performed, if there are no contraindications  There is a 6 mm nodule within the right lobe of the thyroid  Please see above discussion/recommendations/guidelines  Abnormal CT scan, neck     4/28/2022 CTA Head and Neck:  FINDINGS:  NONCONTRAST BRAIN  PARENCHYMA:  No intracranial mass, mass effect or midline shift  No CT signs of acute infarction  No acute parenchymal hemorrhage  VENTRICLES AND EXTRA-AXIAL SPACES:  Normal for the patient's age  VISUALIZED ORBITS AND PARANASAL SINUSES:  Unremarkable  CERVICAL VASCULATURE  AORTIC ARCH AND GREAT VESSELS:  Normal aortic arch and great vessel origins  Normal visualized subclavian vessels  RIGHT VERTEBRAL ARTERY CERVICAL SEGMENT:  Normal origin  The vessel is normal in caliber throughout the neck  LEFT VERTEBRAL ARTERY CERVICAL SEGMENT:  Normal origin  The vessel is normal in caliber throughout the neck  RIGHT EXTRACRANIAL CAROTID SEGMENT:  Normal caliber common carotid artery  Normal bifurcation and cervical internal carotid artery  No stenosis or dissecion  LEFT EXTRACRANIAL CAROTID SEGMENT:  Normal caliber common carotid artery  Normal bifurcation and cervical internal carotid artery  No stenosis or dissection  NASCET criteria was used to determine the degree of internal carotid artery diameter stenosis  INTRACRANIAL VASCULATURE   INTERNAL CAROTID ARTERIES:  Normal enhancement of the intracranial portions of the internal carotid arteries  Normal ophthalmic artery origins  Normal ICA terminus  ANTERIOR CIRCULATION:  Symmetric A1 segments and anterior cerebral arteries with normal enhancement  Normal anterior communicating artery  MIDDLE CEREBRAL ARTERY CIRCULATION:  M1 segment and middle cerebral artery branches demonstrate normal enhancement bilaterally  DISTAL VERTEBRAL ARTERIES:  Normal distal vertebral arteries  Posterior inferior cerebellar artery origins are normal  Normal vertebral basilar junction  BASILAR ARTERY:  Basilar artery is normal in caliber    Normal superior cerebellar arteries  POSTERIOR CEREBRAL ARTERIES: Both posterior cerebral arteries arises from the basilar tip  Both arteries demonstrate normal enhancement  Normal posterior communicating arteries  VENOUS STRUCTURES:  Normal    NON VASCULAR ANATOMY  BONY STRUCTURES:  No acute osseous abnormality  SOFT TISSUES OF THE NECK: There is a 6 mm nodule within the posterior aspect of the right lobe of the thyroid  Incidental discovery of one or more thyroid nodule(s) measuring less than 1 cm and without suspicious features is noted in this patient who is  younger than 28years old; according to guidelines published in the February 2015 white paper on incidental thyroid nodules in the Journal of the [employer] [facility]), no further evaluation is recommended  THORACIC INLET:  Normal    IMPRESSION:   No evidence of acute intracranial abnormality  No large vessel flow restrictive disease within the head or neck  There is a 6 mm nodule within the right lobe of the thyroid  Please see above discussion /recommendations/guidelines             Hypomagnesemia    Relevant Orders    Magnesium    Hypokalemia    Annual physical exam - Primary     Lifestyle modification, diet and exercise discussed  Medications discussed and refilled appropriately  Labs discussed and ordered   Hepatitis C screening discussed  HIV screening discussed  Depression screening performed  Anxiety screening performed   BMI discussed  Cervical cancer screening discussed and ordered          Relevant Orders    CBC and differential    Comprehensive metabolic panel    Encounter for screening for lipoid disorders    Relevant Orders    Lipid panel    Vitamin D deficiency    Relevant Orders    Vitamin D 25 hydroxy

## 2023-01-19 NOTE — PROGRESS NOTES
ADULT ANNUAL PHYSICAL  [address]  [facility]    NAME: Maribel Ag  AGE: 23 y o  SEX: female  : 2000     DATE: 2023     Assessment and Plan:     Problem List Items Addressed This Visit        Endocrine    Thyroid nodule     · 2022 CTA Head and Neck:  FINDINGS:  NONCONTRAST BRAIN  PARENCHYMA:  No intracranial mass, mass effect or midline shift  No CT signs of acute infarction  No acute parenchymal hemorrhage    VENTRICLES AND EXTRA-AXIAL SPACES:  Normal for the patient's age    VISUALIZED ORBITS AND PARANASAL SINUSES:  Unremarkable    CERVICAL VASCULATURE  AORTIC ARCH AND GREAT VESSELS:  Normal aortic arch and great vessel origins  Normal visualized subclavian vessels    RIGHT VERTEBRAL ARTERY CERVICAL SEGMENT:  Normal origin  The vessel is normal in caliber throughout the neck    LEFT VERTEBRAL ARTERY CERVICAL SEGMENT:  Normal origin  The vessel is normal in caliber throughout the neck    RIGHT EXTRACRANIAL CAROTID SEGMENT:  Normal caliber common carotid artery  Normal bifurcation and cervical internal carotid artery  No stenosis or dissecion    LEFT EXTRACRANIAL CAROTID SEGMENT:  Normal caliber common carotid artery  Normal bifurcation and cervical internal carotid artery  No stenosis or dissection    NASCET criteria was used to determine the degree of internal carotid artery diameter stenosis    INTRACRANIAL VASCULATURE   INTERNAL CAROTID ARTERIES:  Normal enhancement of the intracranial portions of the internal carotid arteries  Normal ophthalmic artery origins  Normal ICA terminus     ANTERIOR CIRCULATION:  Symmetric A1 segments and anterior cerebral arteries with normal enhancement  Normal anterior communicating artery    MIDDLE CEREBRAL ARTERY CIRCULATION:  M1 segment and middle cerebral artery branches demonstrate normal enhancement bilaterally    DISTAL VERTEBRAL ARTERIES:  Normal distal vertebral arteries    Posterior inferior cerebellar artery origins are normal  Normal vertebral basilar junction    BASILAR ARTERY:  Basilar artery is normal in caliber  Normal superior cerebellar arteries    POSTERIOR CEREBRAL ARTERIES: Both posterior cerebral arteries arises from the basilar tip  Both arteries demonstrate normal enhancement  Normal posterior communicating arteries    VENOUS STRUCTURES:  Normal    NON VASCULAR ANATOMY  BONY STRUCTURES:  No acute osseous abnormality    SOFT TISSUES OF THE NECK: There is a 6 mm nodule within the posterior aspect of the right lobe of the thyroid  Incidental discovery of one or more thyroid nodule(s) measuring less than 1 cm and without suspicious features is noted in this patient who is  younger than 28years old; according to guidelines published in the February 2015 white paper on incidental thyroid nodules in the Journal of the [employer] of Radiology [facility]), no further evaluation is recommended     THORACIC INLET:  Normal    IMPRESSION:   No evidence of acute intracranial abnormality    No large vessel flow restrictive disease within the head or neck    There is a 6 mm nodule within the right lobe of the thyroid  Please see above discussion /recommendations/guidelines      · 1/19/2023  · Referral to ENT  · Check thyroid labs  · Will order a thyroid ultrasound         Relevant Orders    TSH, 3rd generation    T4, free    T3, free    Anti-thyroglobulin antibody    Thyroid Antibodies Panel    Ambulatory Referral to Otolaryngology    US thyroid       Respiratory    Exercise-induced asthma     · 1/19/2023  · Stable  · Continue albuterol inhaler   · Will refer to pulmonary for PFT evaluation         Relevant Orders    Ambulatory Referral to Pulmonary Rehabilitation       Cardiovascular and Mediastinum    Migraine without aura and without status migrainosus, not intractable (Chronic)     · 1/19/2023   · Review of patient's chart:  · Migraines began around age 17 (2012)  · Bitemporal, mid frontal pressure, with nausea, photophobia, phonophobia, decreased concentration, osmophobia, significant lightheadedness  · Started on amitriptyline 10mg, then stopped  · Started on fluoxetine 10mg then stopped  · Restarted on amitriptyline 10mg and titrated up to 50mg  · Started on maxalt 10mg prn  · Has follows [facility] on:  · 7/24/2015, 1/9/2019, 3/13/2019, 7/10/2019, 11/6/2019  · On 7/10/2019 started on Indomethacin 50mg  · 1/19/2023  · No longer taking amitriptyline, maxalt or indomethacin  · Currently taking topamax 100mg twice a day with controlled headaches  Myocarditis, unspecified chronicity, unspecified myocarditis type ([facility] [address] )     · 3/23/2022 to 3/25/2022  • Possibly related to recent COVID infection/viral  • Medically stable  • Continue colchicine b i d  For 3 months  • Outpatient follow-up with cardiology as scheduled on April 8  • Patient was advised by Cardiology not to undergo strenuous activities for 3 months    · 4/8/2022 Cardiology:  IMPRESSION:  • Myopericarditis   • Normal coronary arteries by cardiac catheterization, March 2022  • LVEF 14%, normal diastolic function, small to moderate PFO with left-to-right shunting, trace MR/TR w/ PASP 18 mmHg by 2D echo, March 2022  • 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 by cardiac MRI, April 2022  • Asthma  • Anxiety  • History of COVID 19 infection, January 2022  • History of vasovagal syncope  • Family history of CAD     PLAN:  It was a pleasure to see Clinton Tellez in the office today for follow-up CV evaluation  She is here today due to her history of myocarditis  She has been taking her colchicine without any reported adverse effects  ECG is grossly normal without ischemic changes  Cardiac MRI was performed demonstrating patchy intra myocardial fibrosis of the anterolateral wall  She has no symptoms concerning for angina and no signs or symptoms of heart failure    She examines to be euvolemic in the office today  Blood pressure and heart rate are currently stable  Based on her clinical presentation, I have the following recommendations:     1  Due to her evidence of myocarditis noted on cardiac MRI, I have recommended completion of 3 months of colchicine and avoiding high-intensity activity for 3 month duration  2  At the end of 3 months, we will check an exercise stress test for completeness prior to her returning to activity  3  No changes to her medications at this time  4  Should she have recurrence of her symptoms, especially worsening in frequency or severity or change in quality, recommend seeking immediate medical attention  5  We will follow up with her in 2 months  · 4/25/2022 Cardiology Second Opinion:  I had a lengthily discussion with Edson Olivas and her mother today, told her I agree with Dr Silvia Gomez stance on activity restriction  I tried to stress the significance of her abnormal findings in the way of biochemical evidence and MRI evidence of myocardial damage here  At her age I feel the risk alone outweighs the benefit of resuming competitive sports or intermediate to high levels of aerobic activity at this point in time  I told her the risk of arrhythmias significant      I also feel the benefit outweighs the potential risk of colchicine for a total of 3 months time therapy here      I told her I would not pursue any further investigation of her PFO, it seems quite small, and unless her migraines would become crippling would not investigate or entertain closure, even then I might pause at such      I told her there are many positives here, her EF is normal, she had no thrombotic/embolic events, and we know her coronaries are normal which is not a surprise, but all her valves are also normal   She was happy to hear all this information      Despite being sad Fernandarichar Olivas seems to be coping quite well, understandably upset that I provided similar recommendations to her today    I did briefly entertain that we could repeat a cardiac MRI 6 weeks after the 1st and if normal an exercise tolerance test normal without ectopy I would clear her, but she said this would probably be too late      For these reasons for now she is going to continue colchicine, withhold from strenuous activity and competitive sports, and we will order a stress echocardiogram in late June to hopefully clear her for full activity without restriction at that time  She is considering some summer sports      Patient and mother requested a note that they can provide to school as she has gotten behind on some school work  They both gave me verbal permission to disclose her medical illness in said letter, particularly COVID and that she had cardiac complications from such      As long as the stress echo at the end of June is normal she will be cleared without restriction for all activity and can follow-up here p r n  Only  · 4/28/2022 to 4/29/2022 hospitalized for SOB: Cardiology:   Cardiac MRI showing Myocarditis in 3/2022  Patient on Colchicine therapy for three months  HS troponin levels have normalized   Repeat inflammatory markers are normal     Planned for exercise stress testing in June     · 9/19/2022 Cardiology  · Scheduled follow up  · No new issues            PFO (patent foramen ovale)     · 4/8/2022 Cardiology  IMPRESSION:  • Myopericarditis   • Normal coronary arteries by cardiac catheterization, March 2022  • LVEF 33%, normal diastolic function, small to moderate PFO with left-to-right shunting, trace MR/TR w/ PASP 18 mmHg by 2D echo, March 2022  • 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 by cardiac MRI, April 2022  • Asthma  • Anxiety  • History of COVID 19 infection, January 2022  • History of vasovagal syncope  • Family history of CAD     PLAN:  It was a pleasure to see Gustavo Marlin in the office today for follow-up CV evaluation  She is here today due to her history of myocarditis  She has been taking her colchicine without any reported adverse effects  ECG is grossly normal without ischemic changes  Cardiac MRI was performed demonstrating patchy intra myocardial fibrosis of the anterolateral wall  She has no symptoms concerning for angina and no signs or symptoms of heart failure  She examines to be euvolemic in the office today  Blood pressure and heart rate are currently stable  Based on her clinical presentation, I have the following recommendations:     1  Due to her evidence of myocarditis noted on cardiac MRI, I have recommended completion of 3 months of colchicine and avoiding high-intensity activity for 3 month duration  2  At the end of 3 months, we will check an exercise stress test for completeness prior to her returning to activity  3  No changes to her medications at this time  4  Should she have recurrence of her symptoms, especially worsening in frequency or severity or change in quality, recommend seeking immediate medical attention  5  We will follow up with her in 2 months  · 4/25/2022 Cardiology Second Opinion:  I had a lengthily discussion with Fabrizio Montejo and her mother today, told her I agree with Dr Kait Herring stance on activity restriction  I tried to stress the significance of her abnormal findings in the way of biochemical evidence and MRI evidence of myocardial damage here  At her age I feel the risk alone outweighs the benefit of resuming competitive sports or intermediate to high levels of aerobic activity at this point in time    I told her the risk of arrhythmias significant      I also feel the benefit outweighs the potential risk of colchicine for a total of 3 months time therapy here      I told her I would not pursue any further investigation of her PFO, it seems quite small, and unless her migraines would become crippling would not investigate or entertain closure, even then I might pause at such      I told her there are many positives here, her EF is normal, she had no thrombotic/embolic events, and we know her coronaries are normal which is not a surprise, but all her valves are also normal   She was happy to hear all this information      Despite being sad Liz Mckinney seems to be coping quite well, understandably upset that I provided similar recommendations to her today  I did briefly entertain that we could repeat a cardiac MRI 6 weeks after the 1st and if normal an exercise tolerance test normal without ectopy I would clear her, but she said this would probably be too late      For these reasons for now she is going to continue colchicine, withhold from strenuous activity and competitive sports, and we will order a stress echocardiogram in late June to hopefully clear her for full activity without restriction at that time  She is considering some summer sports      Patient and mother requested a note that they can provide to school as she has gotten behind on some school work  They both gave me verbal permission to disclose her medical illness in said letter, particularly COVID and that she had cardiac complications from such      As long as the stress echo at the end of June is normal she will be cleared without restriction for all activity and can follow-up here p r n  Only      · 4/28/2022 to 4/29/2022 hospitalized for SOB: Cardiology:   Cardiac MRI showing Myocarditis in 3/2022  Patient on Colchicine therapy for three months  HS troponin levels have normalized   Repeat inflammatory markers are normal     Planned for exercise stress testing in June     · 9/19/2022 Cardiology  · Scheduled follow up  · No new issues               Other    Anxiety about health     · 1/19/2023  · Her anxiety has been related to her previous health issues that have been discussed   · The patient is stable   · She is negative for anxiety and depression  · She does take topamax, but this is for her diagnosis of migraines  · She is currently controlled on her topamax Elevated troponin     · 3/23/2022 to 3/25/2022  · Troponins: 31, 1051, 2433, 3035, 3866           History of echocardiogram     · 3/23/2022  Interpretation Summary       •  Left Ventricle: Left ventricular cavity size is normal  Wall thickness is normal  The left ventricular ejection fraction is 50%  Systolic function is low normal  Although no diagnostic regional wall motion abnormality was identified, this possibility cannot be completely excluded on the basis of this study  Diastolic function is normal   •  Right Ventricle: Right ventricular cavity size is mildly dilated  Systolic function is low normal   •  Tricuspid Valve: There is mild regurgitation  •  Pericardium: There is no pericardial effusion  The pericardium is normal in appearance  · 3/25/2022  Interpretation Summary       •  Left Ventricle: Left ventricular cavity size is normal  Wall thickness is normal  The left ventricular ejection fraction is 55% by visual estimation  Systolic function is normal  Wall motion is normal  Diastolic function is normal   •  Right Ventricle: Right ventricular cavity size is top normal in size  Systolic function is normal   •  Atrial Septum: There is evidence of left-to-right shunting by color-flow Doppler consistent with small moderate sized patent foramen ovale  •  Mitral Valve: There is trace regurgitation  •  Tricuspid Valve: There is trace regurgitation  Pulmonary artery systolic pressures are estimated at 18 mm Hg  •  Compared to report from March 23, 2022, there is mild improvement in left ventricular function  · 4/28/2022  Interpretation Summary       •  Left Ventricle: Left ventricular cavity size is normal  Wall thickness is normal  The left ventricular ejection fraction is 50-55%  Systolic function is low normal  Wall motion is normal  Diastolic function is normal            History of cardiac cath     · 3/24/2022  · Cardiac cath performed via the R radial artery    Left Main   The vessel was visualized by angiography and is angiographically normal       Left Anterior Descending   The vessel was visualized by angiography and is angiographically normal       Left Circumflex   The vessel was visualized by angiography and is angiographically normal       Right Coronary Artery   The vessel was visualized by angiography, is angiographically normal and dominant  Intervention     No interventions have been documented  H/O MRI of heart     · 35/2022  23year old woman for evaluation for myopericarditis      Technique:  1  3 plane SSFP localizers  2  SSFP cine imaging in long and short axis plane  3  T2 weighted DIR FSE in short axis plane  4  14 ml gadobutrol power injected  5  2D inversion recovery FGRE for delayed myocardial enhancement  6  The patient tolerated the procedure well without complication      Measurements:   Az-septal wall 7 mm  Postero-lateral wall 6 mm  Left ventricular end-diastolic dimension 54 mm  Left ventricular end-systolic dimension 40 mm  Left ventricular end-diastolic volume 559 ml  Left ventricular end-systolic volume  54 ml  Stroke volume 77 ml  Cardiac Output 4 2 L/min  Ejection fraction 59 %  Left atrium 29 mm  Aortic Root 24 mm     Findings:    1  The left ventricle is normal in size with normal wall thickness  Left ventricular systolic function is normal with a measured ejection fraction of 59%  There are no regional left ventricular wall motion abnormalities  2  The right ventricle is normal in size with normal right ventricular systolic function  3  The aortic, mitral, and tricuspid valves open without restriction  There is no significant valvular regurgitation, however cine MRI is inaccurate in the qualitative assessment of valvular regurgitation  4  The left atrium is normal in size  The aortic root is normal in size  5  There is no evidence of myocardial edema    6  Delayed post-gadolinium imaging demonstrates patchy intramyocardial hyperenhancement in the mid anterolateral wall      IMPRESSION:  Impression:  1  Normal left and right ventricular size and systolic function  2  No significant valvular abnormalities  3  Patchy intramyocardial fibrosis in the mid anterolateral wall  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  Abnormal CT of the chest     · 4/28/2022 CTA PE Study  Chest:  FINDINGS:   PULMONARY ARTERIAL TREE:  The present study is limited by motion artifact, streak artifact (largely related to the patient's arms, which were at her sides for the study), and the timing of contrast administration  The contrast material within the main pulmonary artery measures 300 Hounsfield units whereas the ascending thoracic aorta measures 555 Hounsfield units  Within these limitations, there is no evidence of large central pulmonary embolism  Evaluation of the segmental and subsegmental branches  is limited    LUNGS:  Minimal dependent changes are present  There is no focal consolidation  There is no pneumothorax    PLEURA:  Unremarkable    HEART/GREAT VESSELS:  No pericardial effusion  No thoracic aortic aneurysm    MEDIASTINUM AND MIKEY:  Residual thymic tissue in the anterior mediastinum, similar to the prior study    CHEST WALL AND LOWER NECK: There is a 6 mm nodule within the posterior aspect of the right lobe of the thyroid    Incidental discovery of one or more thyroid nodule(s) measuring less than 1 cm and without suspicious features is noted in this patient who is younger than 28years old; according to guidelines published in the February 2015 white paper on incidental thyroid nodules in the Journal of the [employer] [facility]), no further evaluation is recommended     VISUALIZED STRUCTURES IN THE UPPER ABDOMEN:  Unremarkable    OSSEOUS STRUCTURES:  No acute fracture or destructive osseous lesion    IMPRESSION:   Extremely technically limited study  No evidence of large central pulmonary embolism  Evaluation of the segmental and subsegmental branches is limited  Clinical correlation is necessary  If there is persistent concern for pulmonary embolism, repeat imaging or nuclear medicine ventilation/perfusion scanning could be performed, if there are no contraindications    There is a 6 mm nodule within the right lobe of the thyroid  Please see above discussion/recommendations/guidelines  Abnormal CT scan, neck     · 4/28/2022 CTA Head and Neck:  FINDINGS:  NONCONTRAST BRAIN  PARENCHYMA:  No intracranial mass, mass effect or midline shift  No CT signs of acute infarction  No acute parenchymal hemorrhage    VENTRICLES AND EXTRA-AXIAL SPACES:  Normal for the patient's age    VISUALIZED ORBITS AND PARANASAL SINUSES:  Unremarkable    CERVICAL VASCULATURE  AORTIC ARCH AND GREAT VESSELS:  Normal aortic arch and great vessel origins  Normal visualized subclavian vessels    RIGHT VERTEBRAL ARTERY CERVICAL SEGMENT:  Normal origin  The vessel is normal in caliber throughout the neck    LEFT VERTEBRAL ARTERY CERVICAL SEGMENT:  Normal origin  The vessel is normal in caliber throughout the neck    RIGHT EXTRACRANIAL CAROTID SEGMENT:  Normal caliber common carotid artery  Normal bifurcation and cervical internal carotid artery  No stenosis or dissecion    LEFT EXTRACRANIAL CAROTID SEGMENT:  Normal caliber common carotid artery  Normal bifurcation and cervical internal carotid artery  No stenosis or dissection    NASCET criteria was used to determine the degree of internal carotid artery diameter stenosis    INTRACRANIAL VASCULATURE   INTERNAL CAROTID ARTERIES:  Normal enhancement of the intracranial portions of the internal carotid arteries  Normal ophthalmic artery origins  Normal ICA terminus     ANTERIOR CIRCULATION:  Symmetric A1 segments and anterior cerebral arteries with normal enhancement    Normal anterior communicating artery    MIDDLE CEREBRAL ARTERY CIRCULATION:  M1 segment and middle cerebral artery branches demonstrate normal enhancement bilaterally    DISTAL VERTEBRAL ARTERIES:  Normal distal vertebral arteries  Posterior inferior cerebellar artery origins are normal  Normal vertebral basilar junction    BASILAR ARTERY:  Basilar artery is normal in caliber  Normal superior cerebellar arteries    POSTERIOR CEREBRAL ARTERIES: Both posterior cerebral arteries arises from the basilar tip  Both arteries demonstrate normal enhancement  Normal posterior communicating arteries    VENOUS STRUCTURES:  Normal    NON VASCULAR ANATOMY  BONY STRUCTURES:  No acute osseous abnormality    SOFT TISSUES OF THE NECK: There is a 6 mm nodule within the posterior aspect of the right lobe of the thyroid  Incidental discovery of one or more thyroid nodule(s) measuring less than 1 cm and without suspicious features is noted in this patient who is  younger than 28years old; according to guidelines published in the February 2015 white paper on incidental thyroid nodules in the Journal of the [employer] of Radiology [facility]), no further evaluation is recommended     THORACIC INLET:  Normal    IMPRESSION:   No evidence of acute intracranial abnormality    No large vessel flow restrictive disease within the head or neck    There is a 6 mm nodule within the right lobe of the thyroid  Please see above discussion /recommendations/guidelines             Hypomagnesemia    Hypokalemia    Annual physical exam - Primary     • Lifestyle modification, diet and exercise discussed  • Medications discussed and refilled appropriately  • Labs discussed and ordered   • Hepatitis C screening discussed  • HIV screening discussed  • Depression screening performed  • Anxiety screening performed   • BMI discussed  • Cervical cancer screening discussed and ordered          Relevant Orders    CBC and differential    Comprehensive metabolic panel    Encounter for screening for lipoid disorders    Relevant Orders    Lipid panel    Vitamin D deficiency    Relevant Orders    Vitamin D 25 hydroxy       Immunizations and preventive care screenings were discussed with patient today  Appropriate education was printed on patient's after visit summary  Counseling:  Alcohol/drug use: discussed moderation in alcohol intake, the recommendations for healthy alcohol use, and avoidance of illicit drug use  Dental Health: discussed importance of regular tooth brushing, flossing, and dental visits  Injury prevention: discussed safety/seat belts, safety helmets, smoke detectors, carbon dioxide detectors, and smoking near bedding or upholstery  Sexual health: discussed sexually transmitted diseases, partner selection, use of condoms, avoidance of unintended pregnancy, and contraceptive alternatives  · Exercise: the importance of regular exercise/physical activity was discussed  Recommend exercise 3-5 times per week for at least 30 minutes  BMI Counseling: Body mass index is 25 03 kg/m²  The BMI is above normal  Nutrition recommendations include decreasing portion sizes, encouraging healthy choices of fruits and vegetables, decreasing fast food intake, consuming healthier snacks, limiting drinks that contain sugar, moderation in carbohydrate intake, increasing intake of lean protein, reducing intake of saturated and trans fat and reducing intake of cholesterol  Exercise recommendations include exercising 3-5 times per week  No pharmacotherapy was ordered  Rationale for BMI follow-up plan is due to patient being overweight or obese  Depression Screening and Follow-up Plan: Patient was screened for depression during today's encounter  They screened negative with a PHQ-2 score of 0  Return in about 6 months (around 7/19/2023) for Recheck       Chief Complaint:     Chief Complaint   Patient presents with   • Annual Exam     Rtn labs, Flu vac done 2022 in  [facility], [place],       History of Present Illness:     Adult Annual Physical   Patient here for a comprehensive physical exam  The patient reports problems - as discussed  Diet and Physical Activity  · Diet/Nutrition: well balanced diet  · Exercise: 5-7 times a week on average  Depression Screening  PHQ-2/9 Depression Screening    Little interest or pleasure in doing things: 0 - not at all  Feeling down, depressed, or hopeless: 0 - not at all  PHQ-2 Score: 0  PHQ-2 Interpretation: Negative depression screen       General Health  · Sleep: sleeps well  · Hearing: normal - bilateral   · Vision: wears glasses and wears contacts  · Dental: regular dental visits  /GYN Health  · Last menstrual period: 1/12/2023  · Contraceptive method: none  · History of STDs?: no      Review of Systems:     Review of Systems   Constitutional: Negative for activity change, chills, fatigue and fever  HENT: Negative for rhinorrhea and sore throat  Eyes: Negative for pain  Respiratory: Negative for cough and shortness of breath  Cardiovascular: Negative for chest pain, palpitations and leg swelling  Gastrointestinal: Negative for abdominal pain, constipation, diarrhea, nausea and vomiting  Genitourinary: Negative for difficulty urinating, flank pain, frequency and urgency  Musculoskeletal: Negative for gait problem, joint swelling and myalgias  Skin: Negative for color change  Neurological: Negative for dizziness, weakness, light-headedness and headaches  Psychiatric/Behavioral: Negative for sleep disturbance  The patient is not nervous/anxious  All other systems reviewed and are negative       Past Medical History:     Past Medical History:   Diagnosis Date   • Allergies    • Carditis       Past Surgical History:     Past Surgical History:   Procedure Laterality Date   • CARDIAC CATHETERIZATION N/A 3/24/2022    Procedure: Cardiac catheterization;  Surgeon: Oz Peraza MD;  Location: [facility]; Service: Cardiology   • CARDIAC CATHETERIZATION N/A 3/24/2022      Social History:     Social History     Socioeconomic History   • Marital status: Single     Spouse name: None   • Number of children: None   • Years of education: None   • Highest education level: None   Occupational History   • None   Tobacco Use   • Smoking status: Never   • Smokeless tobacco: Never   Vaping Use   • Vaping Use: Former   • Substances: Nicotine   Substance and Sexual Activity   • Alcohol use: Yes     Comment: socially   • Drug use: No   • Sexual activity: Yes   Other Topics Concern   • None   Social History Narrative   • None     Social Determinants of Health     Financial Resource Strain: Not on file   Food Insecurity: Not on file   Transportation Needs: Not on file   Physical Activity: Not on file   Stress: Not on file   Social Connections: Not on file   Intimate Partner Violence: Not on file   Housing Stability: Not on file      Family History:     Family History   Problem Relation Age of Onset   • No Known Problems Mother    • No Known Problems Father    • Clotting disorder Family       Current Medications:     Current Outpatient Medications   Medication Sig Dispense Refill   • topiramate (TOPAMAX) 100 mg tablet Take 100 mg by mouth 2 (two) times a day     • albuterol (ProAir HFA) 90 mcg/act inhaler Inhale 2 puffs every 6 (six) hours as needed (exercise induced asthma) (Patient not taking: Reported on 9/19/2022) 8 5 g 0     No current facility-administered medications for this visit  Allergies:     No Known Allergies   Physical Exam:     /70 (BP Location: Left arm, Patient Position: Sitting, Cuff Size: Standard)   Pulse 80   Temp 99 °F (37 2 °C)   Ht 5' 8" (1 727 m)   Wt 74 7 kg (164 lb 9 6 oz)   SpO2 98%   BMI 25 03 kg/m²     Physical Exam  Vitals and nursing note reviewed  Constitutional:       General: She is awake  She is not in acute distress  Appearance: Normal appearance   She is well-developed and overweight  HENT:      Head: Normocephalic and atraumatic  Nose: Nose normal       Mouth/Throat:      Mouth: Mucous membranes are moist    Eyes:      Conjunctiva/sclera: Conjunctivae normal    Cardiovascular:      Rate and Rhythm: Normal rate and regular rhythm  Pulses: Normal pulses  Heart sounds: Normal heart sounds  No murmur heard  Pulmonary:      Effort: Pulmonary effort is normal  No respiratory distress  Breath sounds: Normal breath sounds  Abdominal:      General: Bowel sounds are normal       Palpations: Abdomen is soft  Tenderness: There is no abdominal tenderness  Musculoskeletal:      Cervical back: Neck supple  Right lower leg: No edema  Left lower leg: No edema  Skin:     General: Skin is warm and dry  Neurological:      Mental Status: She is alert and oriented to person, place, and time  Psychiatric:         Attention and Perception: Attention normal          Mood and Affect: Mood normal          Speech: Speech normal          Behavior: Behavior normal  Behavior is cooperative            Flossie Libman, CRNP   [facility]

## 2023-01-20 DIAGNOSIS — E55.9 VITAMIN D DEFICIENCY: Primary | ICD-10-CM

## 2023-01-20 LAB
THYROGLOB AB SERPL-ACNC: <1 IU/ML (ref 0–0.9)
THYROPEROXIDASE AB SERPL-ACNC: 12 IU/ML (ref 0–34)

## 2023-01-20 RX ORDER — MELATONIN
2000 DAILY
Qty: 180 TABLET | Refills: 3 | Status: SHIPPED | OUTPATIENT
Start: 2023-01-20

## 2023-01-30 NOTE — PATIENT INSTRUCTIONS
Cognitive/School Plan: already back to school   Activity Plan: continue full activity    Headache/migraine treatment:   Abortive medications (for immediate treatment of a headache): Ok to take ibuprofen or acetaminophen for headaches, but try to limit the amount and frequency that you are taking to avoid medication overuse/rebound headache  Maxalt (orally dissolving tablet - ODT) 10mg tabs - take one at the onset of headache  May repeat one time after 1-2 hours if pain has not resolved  Prescription preventive medications for headaches/migraines   (to take every day to help prevent headaches - not to take at the time of headache):   Amytriptyline start 10mg at bedtime  Increase by 10mg each week until good effect on headaches/pain or reach 50mg daily   This may help with:  [x] Headache prevention   [x] Sleep   [x] Mood/Emotion  *Typically these types of medications take time untill you see the benefit, although some may see improvement in days, often it may take weeks, especially if the medication is being titrated up to a beneficial level  Please contact us via email if there are any concerns or questions regarding the medication  Lifestyle Recommendations:  - Maintain good sleep hygiene  Going to bed and waking up at consistent times, avoiding excessive daytime naps, avoiding caffeinated beverages in the evening, avoid excessive stimulation in the evening and generally using bed primarily for sleeping  One hour before bedtime would recommend turning lights down lower, decreasing your activity (may read quietly, listen to music at a low volume)  When you get into bed, should eliminate all technology (no texting, emailing, playing with your phone, iPad or tablet in bed)  - Maintain good hydration  This is particularly important as one begins to increase physical activity  Drink  2L of fluid a day (4 typical small water bottles)  - Maintain good nutrition   In particular don't skip meals and eat balanced Flow cytometry (1/14/23) consistent with acute leukemia (B ALL), FISH detected BCR/ABL (1/19)  Monitor daily CBC with Diff/CMP and transfuse/replete PRN  Patient was on warfarin-NOW HELD for mechanical AVR (1994 [place] [facility]), atrial fibrillation  last dose of warfarin 1/13 at [facility] (5mg)-cardiology consulted.   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   1/19 Started Dasatinib  Vincristine 1.5 mg on Day 8 1/25 with Elitek 3mg IV x1.   Transfuse for Hgb <7.0, platelet count > 50 (+SDH)  1/29 Transfuse 1u PRBC, Lasix 40mg IV x 1, replete K/Mg PO/IV  Day 15 BMbx on 2/1 and LP to be determined but prior to discharge.   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   1/19 Started Dasatinib  Vincristine 1.5 mg on Day 8 1/25 with Elitek 3mg IV x1.   Transfuse for Hgb <7.0, platelet count > 50 (+SDH)  Day 15 BMbx on 2/1 and LP to be determined but prior to discharge.   Social work consult to address family issues  1/30-Platelets - 35, transfuse one unit platelets with f/u platelet count.  1/30- Hypophosphatemia - Replete phos.

## 2023-02-10 DIAGNOSIS — E04.1 THYROID NODULE: Primary | ICD-10-CM

## 2023-02-10 DIAGNOSIS — J45.990 EXERCISE-INDUCED ASTHMA: ICD-10-CM

## 2023-02-28 ENCOUNTER — TELEPHONE (OUTPATIENT)
Dept: INTERNAL MEDICINE CLINIC | Facility: CLINIC | Age: 23
End: 2023-02-28

## 2023-02-28 DIAGNOSIS — J45.990 EXERCISE-INDUCED ASTHMA: Primary | ICD-10-CM

## 2023-03-02 ENCOUNTER — HOSPITAL ENCOUNTER (OUTPATIENT)
Dept: PULMONOLOGY | Facility: HOSPITAL | Age: 23
End: 2023-03-02

## 2023-03-02 DIAGNOSIS — J45.990 EXERCISE-INDUCED ASTHMA: ICD-10-CM

## 2023-03-02 RX ORDER — ALBUTEROL SULFATE 2.5 MG/3ML
2.5 SOLUTION RESPIRATORY (INHALATION) ONCE AS NEEDED
Status: COMPLETED | OUTPATIENT
Start: 2023-03-02 | End: 2023-03-02

## 2023-03-02 RX ADMIN — ALBUTEROL SULFATE 2.5 MG: 2.5 SOLUTION RESPIRATORY (INHALATION) at 14:36

## 2023-03-20 PROBLEM — Z13.220 ENCOUNTER FOR SCREENING FOR LIPOID DISORDERS: Status: RESOLVED | Noted: 2023-01-19 | Resolved: 2023-03-20

## 2023-04-06 ENCOUNTER — OFFICE VISIT (OUTPATIENT)
Dept: INTERNAL MEDICINE CLINIC | Facility: CLINIC | Age: 23
End: 2023-04-06

## 2023-04-06 VITALS
SYSTOLIC BLOOD PRESSURE: 128 MMHG | HEIGHT: 68 IN | HEART RATE: 79 BPM | TEMPERATURE: 97.5 F | WEIGHT: 166.4 LBS | BODY MASS INDEX: 25.22 KG/M2 | DIASTOLIC BLOOD PRESSURE: 76 MMHG | OXYGEN SATURATION: 98 %

## 2023-04-06 DIAGNOSIS — F41.8 ANXIETY ABOUT HEALTH: ICD-10-CM

## 2023-04-06 DIAGNOSIS — Z02.89 ENCOUNTER FOR PHYSICAL EXAMINATION RELATED TO EMPLOYMENT: Primary | ICD-10-CM

## 2023-04-06 DIAGNOSIS — E04.1 THYROID NODULE: ICD-10-CM

## 2023-04-06 DIAGNOSIS — E55.9 VITAMIN D DEFICIENCY: ICD-10-CM

## 2023-04-06 DIAGNOSIS — S06.0X0D CONCUSSION WITHOUT LOSS OF CONSCIOUSNESS, SUBSEQUENT ENCOUNTER: ICD-10-CM

## 2023-04-06 DIAGNOSIS — J45.990 EXERCISE-INDUCED ASTHMA: ICD-10-CM

## 2023-04-06 DIAGNOSIS — R51.9 CHRONIC DAILY HEADACHE: ICD-10-CM

## 2023-04-06 DIAGNOSIS — Z92.89 HISTORY OF ECHOCARDIOGRAM: ICD-10-CM

## 2023-04-06 PROBLEM — E87.6 HYPOKALEMIA: Status: RESOLVED | Noted: 2023-01-19 | Resolved: 2023-04-06

## 2023-04-06 PROBLEM — R79.89 ELEVATED TROPONIN: Status: RESOLVED | Noted: 2022-03-23 | Resolved: 2023-04-06

## 2023-04-06 PROBLEM — R77.8 ELEVATED TROPONIN: Status: RESOLVED | Noted: 2022-03-23 | Resolved: 2023-04-06

## 2023-04-06 PROBLEM — S06.0XAA CONCUSSION: Status: ACTIVE | Noted: 2023-04-06

## 2023-04-06 PROBLEM — E83.42 HYPOMAGNESEMIA: Status: RESOLVED | Noted: 2023-01-19 | Resolved: 2023-04-06

## 2023-04-06 NOTE — LETTER
April 6, 2023     Patient: Ugo Sterling  YOB: 2000  Date of Visit: 4/6/2023      To Whom it May Concern:    Ugo Sterling is under my professional care  Natalia Cesar was seen in my office on 4/6/2023  Marcelohugh Guerrero is medically cleared for her [employer] duty  She has not had any fainting or passing out episodes  If you have any questions or concerns, please don't hesitate to call           Sincerely,          SVEN Carlos        CC: No Recipients

## 2023-04-06 NOTE — ASSESSMENT & PLAN NOTE
· 4/28/2022 CTA Head and Neck:  FINDINGS:  NONCONTRAST BRAIN  PARENCHYMA:  No intracranial mass, mass effect or midline shift  No CT signs of acute infarction  No acute parenchymal hemorrhage    VENTRICLES AND EXTRA-AXIAL SPACES:  Normal for the patient's age    VISUALIZED ORBITS AND PARANASAL SINUSES:  Unremarkable    CERVICAL VASCULATURE  AORTIC ARCH AND GREAT VESSELS:  Normal aortic arch and great vessel origins  Normal visualized subclavian vessels    RIGHT VERTEBRAL ARTERY CERVICAL SEGMENT:  Normal origin  The vessel is normal in caliber throughout the neck    LEFT VERTEBRAL ARTERY CERVICAL SEGMENT:  Normal origin  The vessel is normal in caliber throughout the neck    RIGHT EXTRACRANIAL CAROTID SEGMENT:  Normal caliber common carotid artery  Normal bifurcation and cervical internal carotid artery  No stenosis or dissecion    LEFT EXTRACRANIAL CAROTID SEGMENT:  Normal caliber common carotid artery  Normal bifurcation and cervical internal carotid artery  No stenosis or dissection    NASCET criteria was used to determine the degree of internal carotid artery diameter stenosis    INTRACRANIAL VASCULATURE   INTERNAL CAROTID ARTERIES:  Normal enhancement of the intracranial portions of the internal carotid arteries  Normal ophthalmic artery origins  Normal ICA terminus     ANTERIOR CIRCULATION:  Symmetric A1 segments and anterior cerebral arteries with normal enhancement  Normal anterior communicating artery    MIDDLE CEREBRAL ARTERY CIRCULATION:  M1 segment and middle cerebral artery branches demonstrate normal enhancement bilaterally    DISTAL VERTEBRAL ARTERIES:  Normal distal vertebral arteries  Posterior inferior cerebellar artery origins are normal  Normal vertebral basilar junction    BASILAR ARTERY:  Basilar artery is normal in caliber  Normal superior cerebellar arteries    POSTERIOR CEREBRAL ARTERIES: Both posterior cerebral arteries arises from the basilar tip    Both arteries demonstrate normal enhancement  Normal posterior communicating arteries    VENOUS STRUCTURES:  Normal    NON VASCULAR ANATOMY  BONY STRUCTURES:  No acute osseous abnormality    SOFT TISSUES OF THE NECK: There is a 6 mm nodule within the posterior aspect of the right lobe of the thyroid  Incidental discovery of one or more thyroid nodule(s) measuring less than 1 cm and without suspicious features is noted in this patient who is  younger than 28years old; according to guidelines published in the February 2015 white paper on incidental thyroid nodules in the Journal of the [employer] of Radiology Stephen Julien), no further evaluation is recommended     THORACIC INLET:  Normal    IMPRESSION:   No evidence of acute intracranial abnormality    No large vessel flow restrictive disease within the head or neck    There is a 6 mm nodule within the right lobe of the thyroid  Please see above discussion /recommendations/guidelines      · 1/19/2023  · Referral to ENT  · Check thyroid labs    · 4/6/2023  · Referral to ENT  · Thyroid ultrasound ordered

## 2023-04-06 NOTE — ASSESSMENT & PLAN NOTE
· 4/6/2023  · Not a current issue  · Patient is maintained on Topiramate without issue  · Daily headaches are minimal

## 2023-04-06 NOTE — PROGRESS NOTES
Assessment/Plan:     Problem List Items Addressed This Visit        Endocrine    Thyroid nodule     · 4/28/2022 CTA Head and Neck:  FINDINGS:  NONCONTRAST BRAIN  PARENCHYMA:  No intracranial mass, mass effect or midline shift  No CT signs of acute infarction  No acute parenchymal hemorrhage    VENTRICLES AND EXTRA-AXIAL SPACES:  Normal for the patient's age    VISUALIZED ORBITS AND PARANASAL SINUSES:  Unremarkable    CERVICAL VASCULATURE  AORTIC ARCH AND GREAT VESSELS:  Normal aortic arch and great vessel origins  Normal visualized subclavian vessels    RIGHT VERTEBRAL ARTERY CERVICAL SEGMENT:  Normal origin  The vessel is normal in caliber throughout the neck    LEFT VERTEBRAL ARTERY CERVICAL SEGMENT:  Normal origin  The vessel is normal in caliber throughout the neck    RIGHT EXTRACRANIAL CAROTID SEGMENT:  Normal caliber common carotid artery  Normal bifurcation and cervical internal carotid artery  No stenosis or dissecion    LEFT EXTRACRANIAL CAROTID SEGMENT:  Normal caliber common carotid artery  Normal bifurcation and cervical internal carotid artery  No stenosis or dissection    NASCET criteria was used to determine the degree of internal carotid artery diameter stenosis    INTRACRANIAL VASCULATURE   INTERNAL CAROTID ARTERIES:  Normal enhancement of the intracranial portions of the internal carotid arteries  Normal ophthalmic artery origins  Normal ICA terminus     ANTERIOR CIRCULATION:  Symmetric A1 segments and anterior cerebral arteries with normal enhancement  Normal anterior communicating artery    MIDDLE CEREBRAL ARTERY CIRCULATION:  M1 segment and middle cerebral artery branches demonstrate normal enhancement bilaterally    DISTAL VERTEBRAL ARTERIES:  Normal distal vertebral arteries  Posterior inferior cerebellar artery origins are normal  Normal vertebral basilar junction    BASILAR ARTERY:  Basilar artery is normal in caliber    Normal superior cerebellar arteries    POSTERIOR CEREBRAL ARTERIES: Both posterior cerebral arteries arises from the basilar tip  Both arteries demonstrate normal enhancement  Normal posterior communicating arteries    VENOUS STRUCTURES:  Normal    NON VASCULAR ANATOMY  BONY STRUCTURES:  No acute osseous abnormality    SOFT TISSUES OF THE NECK: There is a 6 mm nodule within the posterior aspect of the right lobe of the thyroid  Incidental discovery of one or more thyroid nodule(s) measuring less than 1 cm and without suspicious features is noted in this patient who is  younger than 28years old; according to guidelines published in the February 2015 white paper on incidental thyroid nodules in the Journal of the [employer] [facility] [employer]), no further evaluation is recommended     THORACIC INLET:  Normal    IMPRESSION:   No evidence of acute intracranial abnormality    No large vessel flow restrictive disease within the head or neck    There is a 6 mm nodule within the right lobe of the thyroid  Please see above discussion /recommendations/guidelines  · 1/19/2023  · Referral to ENT  · Check thyroid labs    · 4/6/2023  · Referral to ENT  · Thyroid ultrasound ordered           Relevant Orders    US thyroid    Ambulatory Referral to Otolaryngology       Respiratory    Exercise-induced asthma     · This has been determined: RESOLVED  · 3/2/2023 PFT Report:  · The patient underwent spirometry, lung volumes, diffusion capacity and airway resistance studies  The patient gave a good effort      Study Interpretation:   • Normal lung volumes  • Normal airflow and vital capacity  • No significant improvement in airflow or vital capacity following the administration of bronchodilators  • Normal diffusion capacity  • Normal airway resistance as indicated by the specific airway conductance              Other    Chronic daily headache     · 4/6/2023  · Not a current issue  · Patient is maintained on Topiramate without issue  · Daily headaches are minimal  Anxiety about health     · 4/6/2023  · Currently resolved  History of echocardiogram     · 3/23/2022  Interpretation Summary       •  Left Ventricle: Left ventricular cavity size is normal  Wall thickness is normal  The left ventricular ejection fraction is 50%  Systolic function is low normal  Although no diagnostic regional wall motion abnormality was identified, this possibility cannot be completely excluded on the basis of this study  Diastolic function is normal   •  Right Ventricle: Right ventricular cavity size is mildly dilated  Systolic function is low normal   •  Tricuspid Valve: There is mild regurgitation  •  Pericardium: There is no pericardial effusion  The pericardium is normal in appearance  · 3/25/2022  Interpretation Summary       •  Left Ventricle: Left ventricular cavity size is normal  Wall thickness is normal  The left ventricular ejection fraction is 55% by visual estimation  Systolic function is normal  Wall motion is normal  Diastolic function is normal   •  Right Ventricle: Right ventricular cavity size is top normal in size  Systolic function is normal   •  Atrial Septum: There is evidence of left-to-right shunting by color-flow Doppler consistent with small moderate sized patent foramen ovale  •  Mitral Valve: There is trace regurgitation  •  Tricuspid Valve: There is trace regurgitation  Pulmonary artery systolic pressures are estimated at 18 mm Hg  •  Compared to report from March 23, 2022, there is mild improvement in left ventricular function  · 4/28/2022  Interpretation Summary       •  Left Ventricle: Left ventricular cavity size is normal  Wall thickness is normal  The left ventricular ejection fraction is 50-55%   Systolic function is low normal  Wall motion is normal  Diastolic function is normal     · 9/16/2022  Interpretation Summary       •  Left Ventricle: Left ventricular cavity size is normal  Systolic function is normal  Wall motion is normal   • Stress ECG: The stress ECG is negative for ischemia after maximal exercise, without reproduction of symptoms  •  Echo Post Impression: The study is normal   •  Peak Stress Echo: Left ventricle cavity has normal reduction in size at peak stress  The left ventricle systolic function is normal at peak stress  The peak stress echo showed normal wall motion      Normal study  LV function now normal            Vitamin D deficiency     · 4/6/2023  · Compliant with supplementation          Encounter for physical examination related to employment - Primary     · 4/6/2023  · The patient is cleared for [address] [place] [address],[address] Duty with NO Restrictions on Exercise  · She has not had any Fainting or Passing Out Episodes  · She is Medically Cleared  Concussion     · 4/6/2023  · This patient is cleared of concussion signs and symptoms            Subjective:      Patient ID: Cheryle Black is a 26 y o  female  The patient is here today to discuss her medical clearance for her [employer] duty  She has not had any fainting or passing out episodes  Please continue to the [facility] section of the note for details of today's visit  The following portions of the patient's history were reviewed and updated as appropriate:     Past Medical History:  She has a past medical history of Carditis  ,  _______________________________________________________________________  Medical Problems:  does not have any pertinent problems on file ,  _______________________________________________________________________  Past Surgical History:   has a past surgical history that includes Cardiac catheterization (N/A, 3/24/2022) and Cardiac catheterization (N/A, 3/24/2022)  ,  _______________________________________________________________________  Family History:  family history includes Clotting disorder in her family;  No Known Problems in her father and mother ,  _______________________________________________________________________  Social History: reports that she has never smoked  She has never used smokeless tobacco  She reports current alcohol use  She reports that she does not use drugs  ,  _______________________________________________________________________  Allergies:  has No Known Allergies     _______________________________________________________________________  Current Outpatient Medications   Medication Sig Dispense Refill   • cholecalciferol (VITAMIN D3) 1,000 units tablet Take 2 tablets (2,000 Units total) by mouth daily 180 tablet 3   • topiramate (TOPAMAX) 100 mg tablet Take 100 mg by mouth 2 (two) times a day     • albuterol (ProAir HFA) 90 mcg/act inhaler Inhale 2 puffs every 6 (six) hours as needed (exercise induced asthma) (Patient not taking: Reported on 9/19/2022) 8 5 g 0     No current facility-administered medications for this visit      _______________________________________________________________________      Review of Systems   Constitutional: Negative for activity change, chills, fatigue and fever  HENT: Negative for rhinorrhea and sore throat  Eyes: Negative for pain  Respiratory: Negative for cough and shortness of breath  Cardiovascular: Negative for chest pain, palpitations and leg swelling  Gastrointestinal: Negative for abdominal pain, constipation, diarrhea, nausea and vomiting  Genitourinary: Negative for difficulty urinating, flank pain, frequency and urgency  Musculoskeletal: Negative for gait problem, joint swelling and myalgias  Skin: Negative for color change  Neurological: Negative for dizziness, weakness, light-headedness and headaches  Psychiatric/Behavioral: Negative for sleep disturbance  The patient is not nervous/anxious  All other systems reviewed and are negative          Objective:  Vitals:    04/06/23 1359   BP: 128/76   BP Location: Left arm   Patient Position: Sitting   Cuff Size: Standard   Pulse: 79   Temp: 97 5 °F (36 4 °C)   SpO2: 98%   Weight: 75 5 kg (166 lb 6 4 oz) "  Height: 5' 8\" (1 727 m)     Body mass index is 25 3 kg/m²  Physical Exam  Vitals reviewed  Constitutional:       General: She is awake  Appearance: Normal appearance  She is well-developed and normal weight  HENT:      Head: Normocephalic and atraumatic  Nose: Nose normal       Mouth/Throat:      Mouth: Mucous membranes are moist    Eyes:      Extraocular Movements: Extraocular movements intact  Cardiovascular:      Rate and Rhythm: Normal rate and regular rhythm  Pulses: Normal pulses  Heart sounds: Normal heart sounds  Pulmonary:      Effort: Pulmonary effort is normal       Breath sounds: Normal breath sounds  Abdominal:      General: Bowel sounds are normal       Palpations: Abdomen is soft  Musculoskeletal:         General: Normal range of motion  Cervical back: Normal range of motion  Right lower leg: No edema  Left lower leg: No edema  Skin:     General: Skin is warm and dry  Neurological:      Mental Status: She is alert and oriented to person, place, and time  Psychiatric:         Attention and Perception: Attention normal          Mood and Affect: Mood normal          Speech: Speech normal          Behavior: Behavior normal  Behavior is cooperative           "

## 2023-04-06 NOTE — ASSESSMENT & PLAN NOTE
· 3/23/2022  Interpretation Summary       •  Left Ventricle: Left ventricular cavity size is normal  Wall thickness is normal  The left ventricular ejection fraction is 50%  Systolic function is low normal  Although no diagnostic regional wall motion abnormality was identified, this possibility cannot be completely excluded on the basis of this study  Diastolic function is normal   •  Right Ventricle: Right ventricular cavity size is mildly dilated  Systolic function is low normal   •  Tricuspid Valve: There is mild regurgitation  •  Pericardium: There is no pericardial effusion  The pericardium is normal in appearance  · 3/25/2022  Interpretation Summary       •  Left Ventricle: Left ventricular cavity size is normal  Wall thickness is normal  The left ventricular ejection fraction is 55% by visual estimation  Systolic function is normal  Wall motion is normal  Diastolic function is normal   •  Right Ventricle: Right ventricular cavity size is top normal in size  Systolic function is normal   •  Atrial Septum: There is evidence of left-to-right shunting by color-flow Doppler consistent with small moderate sized patent foramen ovale  •  Mitral Valve: There is trace regurgitation  •  Tricuspid Valve: There is trace regurgitation  Pulmonary artery systolic pressures are estimated at 18 mm Hg  •  Compared to report from March 23, 2022, there is mild improvement in left ventricular function  · 4/28/2022  Interpretation Summary       •  Left Ventricle: Left ventricular cavity size is normal  Wall thickness is normal  The left ventricular ejection fraction is 50-55%   Systolic function is low normal  Wall motion is normal  Diastolic function is normal     · 9/16/2022  Interpretation Summary       •  Left Ventricle: Left ventricular cavity size is normal  Systolic function is normal  Wall motion is normal   •  Stress ECG: The stress ECG is negative for ischemia after maximal exercise, without reproduction of symptoms  •  Echo Post Impression: The study is normal   •  Peak Stress Echo: Left ventricle cavity has normal reduction in size at peak stress  The left ventricle systolic function is normal at peak stress  The peak stress echo showed normal wall motion      Normal study    LV function now normal

## 2023-04-06 NOTE — ASSESSMENT & PLAN NOTE
· 4/6/2023  · The patient is cleared for  Duty with NO Restrictions on Exercise  · She has not had any Fainting or Passing Out Episodes  · She is Medically Cleared

## 2023-04-06 NOTE — PATIENT INSTRUCTIONS
Problem List Items Addressed This Visit          Endocrine    Thyroid nodule     4/28/2022 CTA Head and Neck:  FINDINGS:  NONCONTRAST BRAIN  PARENCHYMA:  No intracranial mass, mass effect or midline shift  No CT signs of acute infarction  No acute parenchymal hemorrhage  VENTRICLES AND EXTRA-AXIAL SPACES:  Normal for the patient's age  VISUALIZED ORBITS AND PARANASAL SINUSES:  Unremarkable  CERVICAL VASCULATURE  AORTIC ARCH AND GREAT VESSELS:  Normal aortic arch and great vessel origins  Normal visualized subclavian vessels  RIGHT VERTEBRAL ARTERY CERVICAL SEGMENT:  Normal origin  The vessel is normal in caliber throughout the neck  LEFT VERTEBRAL ARTERY CERVICAL SEGMENT:  Normal origin  The vessel is normal in caliber throughout the neck  RIGHT EXTRACRANIAL CAROTID SEGMENT:  Normal caliber common carotid artery  Normal bifurcation and cervical internal carotid artery  No stenosis or dissecion  LEFT EXTRACRANIAL CAROTID SEGMENT:  Normal caliber common carotid artery  Normal bifurcation and cervical internal carotid artery  No stenosis or dissection  NASCET criteria was used to determine the degree of internal carotid artery diameter stenosis  INTRACRANIAL VASCULATURE   INTERNAL CAROTID ARTERIES:  Normal enhancement of the intracranial portions of the internal carotid arteries  Normal ophthalmic artery origins  Normal ICA terminus  ANTERIOR CIRCULATION:  Symmetric A1 segments and anterior cerebral arteries with normal enhancement  Normal anterior communicating artery  MIDDLE CEREBRAL ARTERY CIRCULATION:  M1 segment and middle cerebral artery branches demonstrate normal enhancement bilaterally  DISTAL VERTEBRAL ARTERIES:  Normal distal vertebral arteries  Posterior inferior cerebellar artery origins are normal  Normal vertebral basilar junction  BASILAR ARTERY:  Basilar artery is normal in caliber  Normal superior cerebellar arteries     POSTERIOR CEREBRAL ARTERIES: Both posterior cerebral arteries arises from the basilar tip  Both arteries demonstrate normal enhancement  Normal posterior communicating arteries  VENOUS STRUCTURES:  Normal    NON VASCULAR ANATOMY  BONY STRUCTURES:  No acute osseous abnormality  SOFT TISSUES OF THE NECK: There is a 6 mm nodule within the posterior aspect of the right lobe of the thyroid  Incidental discovery of one or more thyroid nodule(s) measuring less than 1 cm and without suspicious features is noted in this patient who is  younger than 28years old; according to guidelines published in the February 2015 white paper on incidental thyroid nodules in the Journal of the [employer] [facility]), no further evaluation is recommended  THORACIC INLET:  Normal    IMPRESSION:   No evidence of acute intracranial abnormality  No large vessel flow restrictive disease within the head or neck  There is a 6 mm nodule within the right lobe of the thyroid  Please see above discussion /recommendations/guidelines  1/19/2023  Referral to ENT  Check thyroid labs    4/6/2023  Referral to ENT  Thyroid ultrasound ordered           Relevant Orders    US thyroid    Ambulatory Referral to Otolaryngology       Respiratory    Exercise-induced asthma     This has been determined: RESOLVED  3/2/2023 PFT Report:  The patient underwent spirometry, lung volumes, diffusion capacity and airway resistance studies  The patient gave a good effort  Study Interpretation:   Normal lung volumes  Normal airflow and vital capacity  No significant improvement in airflow or vital capacity following the administration of bronchodilators  Normal diffusion capacity  Normal airway resistance as indicated by the specific airway conductance  Other    Chronic daily headache     4/6/2023  Not a current issue  Patient is maintained on Topiramate without issue  Daily headaches are minimal           Anxiety about health     4/6/2023  Currently resolved  History of echocardiogram     3/23/2022  Interpretation Summary         Left Ventricle: Left ventricular cavity size is normal  Wall thickness is normal  The left ventricular ejection fraction is 50%  Systolic function is low normal  Although no diagnostic regional wall motion abnormality was identified, this possibility cannot be completely excluded on the basis of this study  Diastolic function is normal     Right Ventricle: Right ventricular cavity size is mildly dilated  Systolic function is low normal     Tricuspid Valve: There is mild regurgitation  Pericardium: There is no pericardial effusion  The pericardium is normal in appearance  3/25/2022  Interpretation Summary         Left Ventricle: Left ventricular cavity size is normal  Wall thickness is normal  The left ventricular ejection fraction is 55% by visual estimation  Systolic function is normal  Wall motion is normal  Diastolic function is normal     Right Ventricle: Right ventricular cavity size is top normal in size  Systolic function is normal     Atrial Septum: There is evidence of left-to-right shunting by color-flow Doppler consistent with small moderate sized patent foramen ovale  Mitral Valve: There is trace regurgitation  Tricuspid Valve: There is trace regurgitation  Pulmonary artery systolic pressures are estimated at 18 mm Hg  Compared to report from March 23, 2022, there is mild improvement in left ventricular function  4/28/2022  Interpretation Summary         Left Ventricle: Left ventricular cavity size is normal  Wall thickness is normal  The left ventricular ejection fraction is 50-55%   Systolic function is low normal  Wall motion is normal  Diastolic function is normal     9/16/2022  Interpretation Summary         Left Ventricle: Left ventricular cavity size is normal  Systolic function is normal  Wall motion is normal     Stress ECG: The stress ECG is negative for ischemia after maximal exercise, without reproduction of symptoms  Echo Post Impression: The study is normal     Peak Stress Echo: Left ventricle cavity has normal reduction in size at peak stress  The left ventricle systolic function is normal at peak stress  The peak stress echo showed normal wall motion  Normal study  LV function now normal            Vitamin D deficiency     4/6/2023  Compliant with supplementation          Encounter for physical examination related to employment - Primary     4/6/2023  The patient is cleared for  Duty with NO Restrictions on Exercise  She has not had any Fainting or Passing Out Episodes  She is Medically Cleared            Concussion     4/6/2023  This patient is cleared of concussion signs and symptoms

## 2023-04-06 NOTE — ASSESSMENT & PLAN NOTE
· This has been determined: RESOLVED  · 3/2/2023 PFT Report:  · The patient underwent spirometry, lung volumes, diffusion capacity and airway resistance studies  The patient gave a good effort      Study Interpretation:   • Normal lung volumes  • Normal airflow and vital capacity  • No significant improvement in airflow or vital capacity following the administration of bronchodilators  • Normal diffusion capacity  • Normal airway resistance as indicated by the specific airway conductance

## 2023-04-19 ENCOUNTER — TELEPHONE (OUTPATIENT)
Dept: INTERNAL MEDICINE CLINIC | Facility: CLINIC | Age: 23
End: 2023-04-19

## 2023-04-19 NOTE — TELEPHONE ENCOUNTER
Pt emailed I will copy and paste it for you    Bernabe Knowles,    I didn't know if I could use this email or not  I am just writing to see if I can get a note from Mike for my thyroid ultrasound results  The doctor who performed the results told me to reach out and ask because she cannot write it herself  She told me that the nodules she found are fluid filled and more dangerous to remove then to leave alone  If she could look at the results and write note for me for what she thinks I would really appreciate it  My  said I would have a better chance with her note then just the results  Thank you,   Kellie Ho    Can you provide a note with the results?

## 2023-04-24 ENCOUNTER — TELEPHONE (OUTPATIENT)
Dept: CARDIOLOGY CLINIC | Facility: CLINIC | Age: 23
End: 2023-04-24

## 2023-04-24 NOTE — TELEPHONE ENCOUNTER
Needs a letter stating she is healthy to go into the [employer]  Last seen in Sept 22  Does she need another visit or can you write a clearance?

## 2023-06-19 ENCOUNTER — APPOINTMENT (OUTPATIENT)
Dept: LAB | Facility: HOSPITAL | Age: 23
End: 2023-06-19
Payer: COMMERCIAL

## 2023-06-19 DIAGNOSIS — E55.9 VITAMIN D DEFICIENCY: Primary | ICD-10-CM

## 2023-06-19 DIAGNOSIS — E55.9 VITAMIN D DEFICIENCY: ICD-10-CM

## 2023-06-19 PROCEDURE — 82306 VITAMIN D 25 HYDROXY: CPT

## 2023-06-19 PROCEDURE — 36415 COLL VENOUS BLD VENIPUNCTURE: CPT

## 2023-06-20 LAB — 25(OH)D3 SERPL-MCNC: 31.6 NG/ML (ref 30–100)

## 2023-10-06 ENCOUNTER — HOSPITAL ENCOUNTER (EMERGENCY)
Facility: HOSPITAL | Age: 23
Discharge: HOME | End: 2023-10-06
Attending: EMERGENCY MEDICINE
Payer: COMMERCIAL

## 2023-10-06 VITALS
HEART RATE: 69 BPM | BODY MASS INDEX: 26.53 KG/M2 | WEIGHT: 174.5 LBS | RESPIRATION RATE: 20 BRPM | OXYGEN SATURATION: 100 % | SYSTOLIC BLOOD PRESSURE: 120 MMHG | TEMPERATURE: 98.3 F | DIASTOLIC BLOOD PRESSURE: 75 MMHG

## 2023-10-06 DIAGNOSIS — Z01.419 NORMAL PELVIC EXAM: Primary | ICD-10-CM

## 2023-10-06 PROCEDURE — 99283 EMERGENCY DEPT VISIT LOW MDM: CPT

## 2023-10-06 PROCEDURE — 99282 EMERGENCY DEPT VISIT SF MDM: CPT

## 2023-10-07 NOTE — ED PROVIDER NOTES
History  Chief Complaint   Patient presents with   • Foreign Body in Vagina     Patient reports around 4am having sex with partner, they cant find the condom. She did try to reach for it but can't feel it. The patient is a 68-year-old female with a past medical history of a PFO, myocarditis, and migraines, who presents for evaluation for suspected foreign body. She reports having sexual intercourse around 4 AM this morning. She states she was unable to find the condom and earlier today started to experience vaginal discomfort which she described as "similar to a tampon being stuck". No associated abdominal pain, back pain, vaginal bleeding, vaginal discharge, urinary symptoms, or F/C. Prior to Admission Medications   Prescriptions Last Dose Informant Patient Reported? Taking? albuterol (ProAir HFA) 90 mcg/act inhaler  Self No No   Sig: Inhale 2 puffs every 6 (six) hours as needed (exercise induced asthma)   Patient not taking: Reported on 9/19/2022   cholecalciferol (VITAMIN D3) 1,000 units tablet   No No   Sig: Take 2 tablets (2,000 Units total) by mouth daily   topiramate (TOPAMAX) 100 mg tablet  Self Yes No   Sig: Take 100 mg by mouth 2 (two) times a day      Facility-Administered Medications: None       Past Medical History:   Diagnosis Date   • Carditis        Past Surgical History:   Procedure Laterality Date   • CARDIAC CATHETERIZATION N/A 3/24/2022    Procedure: Cardiac catheterization;  Surgeon: Jinny Liao MD;  Location: [facility]; Service: Cardiology   • CARDIAC CATHETERIZATION N/A 3/24/2022       Family History   Problem Relation Age of Onset   • No Known Problems Mother    • No Known Problems Father    • Clotting disorder Family      I have reviewed and agree with the history as documented.     E-Cigarette/Vaping   • E-Cigarette Use Former User      E-Cigarette/Vaping Substances   • Nicotine Yes      Social History     Tobacco Use   • Smoking status: Never   • Smokeless tobacco: Never   Vaping Use   • Vaping Use: Former   • Substances: Nicotine   Substance Use Topics   • Alcohol use: Yes     Comment: socially   • Drug use: No       Review of Systems   Constitutional: Negative for chills and fever. HENT: Negative for ear pain and sore throat. Eyes: Negative for pain and visual disturbance. Respiratory: Negative for cough and shortness of breath. Cardiovascular: Negative for chest pain and palpitations. Gastrointestinal: Negative for abdominal pain, nausea and vomiting. Genitourinary: Positive for pelvic pain. Negative for dysuria, flank pain, frequency, hematuria, urgency, vaginal bleeding and vaginal discharge. Musculoskeletal: Negative for arthralgias and back pain. Skin: Negative for color change and rash. Neurological: Negative for seizures and syncope. All other systems reviewed and are negative. Physical Exam  Physical Exam  Vitals and nursing note reviewed. Exam conducted with a chaperone present (ED tech Pastor Avila). Constitutional:       General: She is awake. She is not in acute distress. Appearance: Normal appearance. She is well-developed and normal weight. She is not toxic-appearing or diaphoretic. HENT:      Head: Normocephalic and atraumatic. Right Ear: External ear normal.      Left Ear: External ear normal.      Nose: Nose normal.      Mouth/Throat:      Lips: Pink. Mouth: Mucous membranes are moist.      Pharynx: Oropharynx is clear. Uvula midline. Eyes:      General: Lids are normal. Vision grossly intact. Gaze aligned appropriately. Conjunctiva/sclera: Conjunctivae normal.      Pupils: Pupils are equal, round, and reactive to light. Cardiovascular:      Rate and Rhythm: Normal rate and regular rhythm. Pulmonary:      Effort: Pulmonary effort is normal. No respiratory distress. Genitourinary:     General: Normal vulva. Labia:         Right: No tenderness. Left: No tenderness.        Vagina: Normal. No vaginal discharge, erythema, tenderness or bleeding. Cervix: No discharge, friability, erythema or cervical bleeding. Comments: Normal internal exam.  No foreign bodies identified. Musculoskeletal:      Cervical back: Full passive range of motion without pain and neck supple. Lymphadenopathy:      Lower Body: No right inguinal adenopathy. No left inguinal adenopathy. Skin:     General: Skin is warm. Capillary Refill: Capillary refill takes less than 2 seconds. Coloration: Skin is not pale. Findings: No erythema, lesion or rash. Neurological:      Mental Status: She is alert and oriented to person, place, and time. Psychiatric:         Behavior: Behavior is cooperative. Vital Signs  ED Triage Vitals [10/06/23 2144]   Temperature Pulse Respirations Blood Pressure SpO2   98.3 °F (36.8 °C) 69 20 120/75 100 %      Temp Source Heart Rate Source Patient Position - Orthostatic VS BP Location FiO2 (%)   Oral Monitor Sitting Left arm --      Pain Score       --           Vitals:    10/06/23 2144   BP: 120/75   Pulse: 69   Patient Position - Orthostatic VS: Sitting       ED Medications  Medications - No data to display    Diagnostic Studies  Results Reviewed     None                 No orders to display              Procedures  Procedures         ED Course       SBIRT 22yo+    Flowsheet Row Most Recent Value   Initial Alcohol Screen: US AUDIT-C     1. How often do you have a drink containing alcohol? 0 Filed at: 10/06/2023 2148   2. How many drinks containing alcohol do you have on a typical day you are drinking? 0 Filed at: 10/06/2023 2148   3a. Male UNDER 65: How often do you have five or more drinks on one occasion? 0 Filed at: 10/06/2023 2148   3b. FEMALE Any Age, or MALE 65+: How often do you have 4 or more drinks on one occassion? 0 Filed at: 10/06/2023 2148   Audit-C Score 0 Filed at: 10/06/2023 2148   LIDA: How many times in the past year have you. ..     Used an illegal drug or used a prescription medication for non-medical reasons? Never Filed at: 10/06/2023 2148            Medical Decision Making  Patient presents with vaginal discomfort. Differential diagnosis includes but is not limited to foreign body, vaginal trauma, vaginitis, or urethritis. No foreign bodies or vaginal irritation/trauma noted on exam.  Patient does not currently have a GYN, therefore information for the [facility] was provided. Return precautions discussed and the patient verbalized understanding. Follow-up with GYN, and return to the ED in the interim with new or worsening symptoms. Normal pelvic exam: acute illness or injury      Disposition  Final diagnoses:   Normal pelvic exam     Time reflects when diagnosis was documented in both MDM as applicable and the Disposition within this note     Time User Action Codes Description Comment    10/6/2023 10:00 PM Cintia [facility] Add [Z01.419] Normal pelvic exam       ED Disposition     ED Disposition   Discharge    Condition   Stable    Date/Time   Fri Oct 6, 2023 10:00 PM    Comment   Dannielle Fourmile discharge to home/self care.                Follow-up Information     Follow up With Specialties Details Why [address], [address] [place] [address] Internal Medicine, Nurse Practitioner   [address] [id number]  [facility] Obstetrics and Gynecology   [address]  [phone]            Discharge Medication List as of 10/6/2023 10:04 PM      CONTINUE these medications which have NOT CHANGED    Details   albuterol (ProAir HFA) 90 mcg/act inhaler Inhale 2 puffs every 6 (six) hours as needed (exercise induced asthma), Starting Wed 9/15/2021, Normal      cholecalciferol (VITAMIN D3) 1,000 units tablet Take 2 tablets (2,000 Units total) by mouth daily, Starting Fri 1/20/2023, Normal      topiramate (TOPAMAX) 100 mg tablet Take 100 mg by mouth 2 (two) times a day, Starting Mon 9/13/2021, Historical Med             No discharge procedures on file.     PDMP Review     None          ED Provider  Electronically Signed by           Bridgett Kanner, PA-C  10/06/23 0808

## 2023-11-13 ENCOUNTER — TELEPHONE (OUTPATIENT)
Dept: INTERNAL MEDICINE CLINIC | Facility: CLINIC | Age: 23
End: 2023-11-13

## 2023-11-13 NOTE — TELEPHONE ENCOUNTER
Left message for patient to call office to reschedule 11/15 PDS physical and PPD test. Dr Dony Silverio will no longer be in the office this afternoon.

## 2023-11-15 ENCOUNTER — OFFICE VISIT (OUTPATIENT)
Dept: INTERNAL MEDICINE CLINIC | Facility: CLINIC | Age: 23
End: 2023-11-15

## 2023-11-15 VITALS
BODY MASS INDEX: 28.25 KG/M2 | WEIGHT: 180 LBS | TEMPERATURE: 98.3 F | HEART RATE: 67 BPM | HEIGHT: 67 IN | DIASTOLIC BLOOD PRESSURE: 55 MMHG | SYSTOLIC BLOOD PRESSURE: 113 MMHG | OXYGEN SATURATION: 99 %

## 2023-11-15 DIAGNOSIS — Z11.1 SCREENING EXAMINATION FOR PULMONARY TUBERCULOSIS: ICD-10-CM

## 2023-11-15 DIAGNOSIS — Z02.89 EXAMINATION, PHYSICAL, EMPLOYEE: Primary | ICD-10-CM

## 2023-11-15 PROCEDURE — 86580 TB INTRADERMAL TEST: CPT

## 2023-11-15 PROCEDURE — 99499 UNLISTED E&M SERVICE: CPT | Performed by: INTERNAL MEDICINE

## 2023-11-15 NOTE — PROGRESS NOTES
Assessment/Plan:      Depression Screening and Follow-up Plan: Patient was screened for depression during today's encounter. They screened negative with a PHQ-2 score of 0.            1. Examination, physical, employee           Subjective:      Patient ID: Rosalina Riggs is a 26 y.o. female. Physical for person direct support        The following portions of the patient's history were reviewed and updated as appropriate: She  has a past medical history of Carditis. She   Patient Active Problem List    Diagnosis Date Noted    Examination, physical, employee 04/06/2023    Concussion 04/06/2023    History of echocardiogram 01/19/2023    History of cardiac cath 01/19/2023    H/O MRI of heart 01/19/2023    Abnormal CT of the chest 01/19/2023    Abnormal CT scan, neck 01/19/2023    Thyroid nodule 01/19/2023    Annual physical exam 01/19/2023    Vitamin D deficiency 01/19/2023    PFO (patent foramen ovale) 04/28/2022    Myocarditis, unspecified chronicity, unspecified myocarditis type ([address])     Exercise-induced asthma 03/23/2022    Anxiety about health 07/10/2019    Chronic daily headache 01/10/2019    Migraine without aura and without status migrainosus, not intractable 01/09/2019     She  has a past surgical history that includes Cardiac catheterization (N/A, 3/24/2022) and Cardiac catheterization (N/A, 3/24/2022). Her family history includes Clotting disorder in her family; No Known Problems in her father and mother. She  reports that she has never smoked. She has never used smokeless tobacco. She reports current alcohol use. She reports that she does not use drugs.   Current Outpatient Medications   Medication Sig Dispense Refill    albuterol (ProAir HFA) 90 mcg/act inhaler Inhale 2 puffs every 6 (six) hours as needed (exercise induced asthma) (Patient not taking: Reported on 9/19/2022) 8.5 g 0    cholecalciferol (VITAMIN D3) 1,000 units tablet Take 2 tablets (2,000 Units total) by mouth daily (Patient not taking: Reported on 11/15/2023) 180 tablet 3    topiramate (TOPAMAX) 100 mg tablet Take 100 mg by mouth 2 (two) times a day (Patient not taking: Reported on 11/15/2023)       No current facility-administered medications for this visit. Current Outpatient Medications on File Prior to Visit   Medication Sig    albuterol (ProAir HFA) 90 mcg/act inhaler Inhale 2 puffs every 6 (six) hours as needed (exercise induced asthma) (Patient not taking: Reported on 9/19/2022)    cholecalciferol (VITAMIN D3) 1,000 units tablet Take 2 tablets (2,000 Units total) by mouth daily (Patient not taking: Reported on 11/15/2023)    topiramate (TOPAMAX) 100 mg tablet Take 100 mg by mouth 2 (two) times a day (Patient not taking: Reported on 11/15/2023)     No current facility-administered medications on file prior to visit. She has No Known Allergies. .    Review of Systems   Constitutional:  Negative for chills and fever. HENT:  Negative for congestion, ear pain and sore throat. Eyes:  Negative for pain. Respiratory:  Negative for cough and shortness of breath. Cardiovascular:  Negative for chest pain and leg swelling. Gastrointestinal:  Negative for abdominal pain, nausea and vomiting. Endocrine: Negative for polyuria. Genitourinary:  Negative for difficulty urinating, frequency and urgency. Musculoskeletal:  Negative for arthralgias and back pain. Skin:  Negative for rash. Neurological:  Negative for weakness and headaches. Psychiatric/Behavioral:  Negative for sleep disturbance. The patient is not nervous/anxious. Objective:      /55 (BP Location: Left arm, Patient Position: Sitting, Cuff Size: Adult)   Pulse 67   Temp 98.3 °F (36.8 °C) (Temporal)   Ht 5' 7" (1.702 m)   Wt 81.6 kg (180 lb)   SpO2 99%   BMI 28.19 kg/m²     No results found for this or any previous visit (from the past 1344 hour(s)). Physical Exam  Constitutional:       Appearance: Normal appearance.    HENT:      Head: Normocephalic. Right Ear: Tympanic membrane, ear canal and external ear normal.      Left Ear: Tympanic membrane, ear canal and external ear normal.      Nose: Nose normal. No congestion. Mouth/Throat:      Mouth: Mucous membranes are moist.      Pharynx: Oropharynx is clear. No oropharyngeal exudate or posterior oropharyngeal erythema. Eyes:      Extraocular Movements: Extraocular movements intact. Conjunctiva/sclera: Conjunctivae normal.   Cardiovascular:      Rate and Rhythm: Normal rate and regular rhythm. Heart sounds: Normal heart sounds. No murmur heard. Pulmonary:      Effort: Pulmonary effort is normal.      Breath sounds: Normal breath sounds. No wheezing or rales. Abdominal:      General: Abdomen is flat. There is no distension. Palpations: Abdomen is soft. Tenderness: There is no abdominal tenderness. Musculoskeletal:         General: Normal range of motion. Cervical back: Normal range of motion and neck supple. Right lower leg: No edema. Left lower leg: No edema. Lymphadenopathy:      Cervical: No cervical adenopathy. Skin:     General: Skin is warm. Neurological:      General: No focal deficit present. Mental Status: She is alert and oriented to person, place, and time.

## 2023-11-17 ENCOUNTER — CLINICAL SUPPORT (OUTPATIENT)
Dept: INTERNAL MEDICINE CLINIC | Facility: CLINIC | Age: 23
End: 2023-11-17

## 2023-11-17 DIAGNOSIS — Z11.1 SCREENING EXAMINATION FOR PULMONARY TUBERCULOSIS: Primary | ICD-10-CM

## 2023-11-17 LAB
INDURATION: 0 MM
TB SKIN TEST: NEGATIVE

## 2024-01-14 PROBLEM — Z02.89 EXAMINATION, PHYSICAL, EMPLOYEE: Status: RESOLVED | Noted: 2023-04-06 | Resolved: 2024-01-14

## 2024-03-26 ENCOUNTER — TELEPHONE (OUTPATIENT)
Dept: INTERNAL MEDICINE CLINIC | Facility: CLINIC | Age: 24
End: 2024-03-26

## 2024-03-26 DIAGNOSIS — Z13.220 ENCOUNTER FOR SCREENING FOR LIPOID DISORDERS: ICD-10-CM

## 2024-03-26 DIAGNOSIS — Z00.00 ANNUAL PHYSICAL EXAM: ICD-10-CM

## 2024-03-26 DIAGNOSIS — E55.9 VITAMIN D DEFICIENCY: ICD-10-CM

## 2024-03-26 DIAGNOSIS — Z13.1 SCREENING FOR DIABETES MELLITUS: ICD-10-CM

## 2024-03-26 DIAGNOSIS — Z13.29 SCREENING FOR THYROID DISORDER: Primary | ICD-10-CM

## 2024-04-11 ENCOUNTER — TELEPHONE (OUTPATIENT)
Dept: FAMILY MEDICINE CLINIC | Facility: CLINIC | Age: 24
End: 2024-04-11

## 2024-04-11 NOTE — TELEPHONE ENCOUNTER
Called patient in regards to apt scheduled on 4/12. She is a patient of Lyssa Montgomery. Left message on voicemail asking if she scheduled mychart message at the wrong office by mistake or if she is trying to establish care at this office. If she is establishing care, she will have to schedule a second apt to complete note for  jeff Pimentel.

## 2024-04-17 ENCOUNTER — APPOINTMENT (OUTPATIENT)
Dept: LAB | Facility: CLINIC | Age: 24
End: 2024-04-17
Payer: COMMERCIAL

## 2024-04-17 DIAGNOSIS — Z00.00 ANNUAL PHYSICAL EXAM: ICD-10-CM

## 2024-04-17 DIAGNOSIS — Z13.220 ENCOUNTER FOR SCREENING FOR LIPOID DISORDERS: ICD-10-CM

## 2024-04-17 DIAGNOSIS — Z13.1 SCREENING FOR DIABETES MELLITUS: ICD-10-CM

## 2024-04-17 DIAGNOSIS — Z13.29 SCREENING FOR THYROID DISORDER: ICD-10-CM

## 2024-04-17 DIAGNOSIS — E55.9 VITAMIN D DEFICIENCY: ICD-10-CM

## 2024-04-17 LAB
ALBUMIN SERPL BCP-MCNC: 4.5 G/DL (ref 3.5–5)
ALP SERPL-CCNC: 50 U/L (ref 34–104)
ALT SERPL W P-5'-P-CCNC: 14 U/L (ref 7–52)
ANION GAP SERPL CALCULATED.3IONS-SCNC: 6 MMOL/L (ref 4–13)
AST SERPL W P-5'-P-CCNC: 15 U/L (ref 13–39)
BILIRUB SERPL-MCNC: 0.42 MG/DL (ref 0.2–1)
BUN SERPL-MCNC: 19 MG/DL (ref 5–25)
CALCIUM SERPL-MCNC: 9.5 MG/DL (ref 8.4–10.2)
CHLORIDE SERPL-SCNC: 105 MMOL/L (ref 96–108)
CHOLEST SERPL-MCNC: 192 MG/DL
CO2 SERPL-SCNC: 29 MMOL/L (ref 21–32)
CREAT SERPL-MCNC: 0.87 MG/DL (ref 0.6–1.3)
ERYTHROCYTE [DISTWIDTH] IN BLOOD BY AUTOMATED COUNT: 11.9 % (ref 11.6–15.1)
EST. AVERAGE GLUCOSE BLD GHB EST-MCNC: 100 MG/DL
GFR SERPL CREATININE-BSD FRML MDRD: 94 ML/MIN/1.73SQ M
GLUCOSE P FAST SERPL-MCNC: 75 MG/DL (ref 65–99)
HBA1C MFR BLD: 5.1 %
HCT VFR BLD AUTO: 45 % (ref 34.8–46.1)
HDLC SERPL-MCNC: 49 MG/DL
HGB BLD-MCNC: 15 G/DL (ref 11.5–15.4)
LDLC SERPL CALC-MCNC: 129 MG/DL (ref 0–100)
MCH RBC QN AUTO: 30.8 PG (ref 26.8–34.3)
MCHC RBC AUTO-ENTMCNC: 33.3 G/DL (ref 31.4–37.4)
MCV RBC AUTO: 92 FL (ref 82–98)
NONHDLC SERPL-MCNC: 143 MG/DL
PLATELET # BLD AUTO: 277 THOUSANDS/UL (ref 149–390)
PMV BLD AUTO: 10.8 FL (ref 8.9–12.7)
POTASSIUM SERPL-SCNC: 4.4 MMOL/L (ref 3.5–5.3)
PROT SERPL-MCNC: 7.5 G/DL (ref 6.4–8.4)
RBC # BLD AUTO: 4.87 MILLION/UL (ref 3.81–5.12)
SODIUM SERPL-SCNC: 140 MMOL/L (ref 135–147)
TRIGL SERPL-MCNC: 69 MG/DL
TSH SERPL DL<=0.05 MIU/L-ACNC: 1.72 UIU/ML (ref 0.45–4.5)
WBC # BLD AUTO: 5.74 THOUSAND/UL (ref 4.31–10.16)

## 2024-04-17 PROCEDURE — 80061 LIPID PANEL: CPT

## 2024-04-17 PROCEDURE — 82306 VITAMIN D 25 HYDROXY: CPT

## 2024-04-17 PROCEDURE — 36415 COLL VENOUS BLD VENIPUNCTURE: CPT

## 2024-04-17 PROCEDURE — 85027 COMPLETE CBC AUTOMATED: CPT

## 2024-04-17 PROCEDURE — 84443 ASSAY THYROID STIM HORMONE: CPT

## 2024-04-17 PROCEDURE — 83036 HEMOGLOBIN GLYCOSYLATED A1C: CPT

## 2024-04-17 PROCEDURE — 80053 COMPREHEN METABOLIC PANEL: CPT

## 2024-04-18 LAB — 25(OH)D3 SERPL-MCNC: 29.8 NG/ML (ref 30–100)

## 2024-04-23 PROBLEM — E83.42 HYPOMAGNESEMIA: Status: RESOLVED | Noted: 2023-01-19 | Resolved: 2024-04-23

## 2024-04-23 PROBLEM — R45.89 ANXIETY ABOUT HEALTH: Status: RESOLVED | Noted: 2019-07-10 | Resolved: 2024-04-23

## 2024-04-23 PROBLEM — Z11.3 SCREENING FOR STDS (SEXUALLY TRANSMITTED DISEASES): Status: ACTIVE | Noted: 2023-01-19

## 2024-04-23 PROBLEM — E78.49 OTHER HYPERLIPIDEMIA: Status: ACTIVE | Noted: 2023-01-19

## 2024-04-23 PROBLEM — Z12.4 SCREENING FOR CERVICAL CANCER: Status: ACTIVE | Noted: 2024-04-23

## 2024-04-23 PROBLEM — S06.0XAA CONCUSSION: Status: RESOLVED | Noted: 2023-04-06 | Resolved: 2024-04-23

## 2024-04-23 PROBLEM — R51.9 CHRONIC DAILY HEADACHE: Status: RESOLVED | Noted: 2019-01-10 | Resolved: 2024-04-23

## 2024-04-23 PROBLEM — Q21.12 PFO (PATENT FORAMEN OVALE): Status: RESOLVED | Noted: 2022-04-28 | Resolved: 2024-04-23

## 2024-04-23 PROBLEM — G43.009 MIGRAINE WITHOUT AURA AND WITHOUT STATUS MIGRAINOSUS, NOT INTRACTABLE: Chronic | Status: RESOLVED | Noted: 2019-01-09 | Resolved: 2024-04-23

## 2024-04-23 PROBLEM — Z13.29 SCREENING FOR THYROID DISORDER: Status: RESOLVED | Noted: 2024-03-26 | Resolved: 2024-04-23

## 2024-04-23 PROBLEM — Z11.59 NEED FOR HEPATITIS C SCREENING TEST: Status: ACTIVE | Noted: 2024-04-23

## 2024-04-23 PROBLEM — Z13.1 SCREENING FOR DIABETES MELLITUS: Status: RESOLVED | Noted: 2024-03-26 | Resolved: 2024-04-23

## 2024-04-23 PROBLEM — Z00.00 ANNUAL PHYSICAL EXAM: Status: ACTIVE | Noted: 2024-04-23

## 2024-04-23 PROBLEM — E04.1 THYROID NODULE: Status: RESOLVED | Noted: 2023-01-19 | Resolved: 2024-04-23

## 2024-04-23 PROBLEM — J45.990 EXERCISE-INDUCED ASTHMA: Status: RESOLVED | Noted: 2022-03-23 | Resolved: 2024-04-23

## 2024-04-23 PROBLEM — Z92.89: Status: RESOLVED | Noted: 2023-01-19 | Resolved: 2024-04-23

## (undated) DEVICE — GLIDESHEATH SLENDER STAINLESS STEEL KIT: Brand: GLIDESHEATH SLENDER

## (undated) DEVICE — DGW .035 FC J3MM 260CM TEF: Brand: EMERALD

## (undated) DEVICE — GUIDEWIRE WHOLEY HI TORQUE INTERM MOD J .035 145CM

## (undated) DEVICE — TR BAND RADIAL ARTERY COMPRESSION DEVICE: Brand: TR BAND

## (undated) DEVICE — CATH DIAG 5FR IMPULSE 100CM FR4

## (undated) DEVICE — RADIFOCUS OPTITORQUE ANGIOGRAPHIC CATHETER: Brand: OPTITORQUE